# Patient Record
Sex: FEMALE | Race: WHITE
[De-identification: names, ages, dates, MRNs, and addresses within clinical notes are randomized per-mention and may not be internally consistent; named-entity substitution may affect disease eponyms.]

---

## 2019-04-07 ENCOUNTER — HOSPITAL ENCOUNTER (EMERGENCY)
Dept: HOSPITAL 62 - ER | Age: 54
LOS: 1 days | Discharge: HOME | End: 2019-04-08
Payer: MEDICAID

## 2019-04-07 DIAGNOSIS — Z88.2: ICD-10-CM

## 2019-04-07 DIAGNOSIS — I10: ICD-10-CM

## 2019-04-07 DIAGNOSIS — H10.32: ICD-10-CM

## 2019-04-07 DIAGNOSIS — I48.91: ICD-10-CM

## 2019-04-07 DIAGNOSIS — Z90.49: ICD-10-CM

## 2019-04-07 DIAGNOSIS — Z88.6: ICD-10-CM

## 2019-04-07 DIAGNOSIS — R11.0: ICD-10-CM

## 2019-04-07 DIAGNOSIS — G43.909: Primary | ICD-10-CM

## 2019-04-07 DIAGNOSIS — I25.2: ICD-10-CM

## 2019-04-07 DIAGNOSIS — E78.00: ICD-10-CM

## 2019-04-07 DIAGNOSIS — Z98.51: ICD-10-CM

## 2019-04-07 DIAGNOSIS — E11.9: ICD-10-CM

## 2019-04-07 DIAGNOSIS — Z88.0: ICD-10-CM

## 2019-04-07 PROCEDURE — 96372 THER/PROPH/DIAG INJ SC/IM: CPT

## 2019-04-07 PROCEDURE — 99283 EMERGENCY DEPT VISIT LOW MDM: CPT

## 2019-04-08 VITALS — SYSTOLIC BLOOD PRESSURE: 129 MMHG | DIASTOLIC BLOOD PRESSURE: 49 MMHG

## 2019-04-08 NOTE — ER DOCUMENT REPORT
ED Headache





- General


Chief Complaint: Headache


Stated Complaint: HEADACHE


Time Seen by Provider: 04/08/19 00:35


Primary Care Provider: 


SCOOTER REZA MD [Primary Care Provider] - Follow up as needed


Mode of Arrival: Ambulatory


TRAVEL OUTSIDE OF THE [place] IN LAST 30 DAYS: No





- HPI


Patient complains to provider of: Headache, "Migraine"


Notes: 





Patient is here with complaints of headache.  The patient has a long history of 

chronic migraines.  She states that she has been having this headache for 6-10 

months.  Headache is been intermittent, feels like her typical migraine type 

headaches, but her Imitrex has not been helping.  She is in the process of 

getting referred to pain management due to her chronic headaches.  She denies 

this being a sudden onset, thunderclap headache.  It was not due to head injury.

 She denies any fevers or neck stiffness.  No blurred or loss vision.  No 

numbness, tingling, weakness.  No chest pain or shortness of breath.  She does 

complain of nausea, but denies any vomiting or diarrhea.  Patient also states 

she has had left eye irritation and drainage for the last 2 days.  She denies 

any severe pain.  No injury.  She does not wear contacts.  She denies any 

blurred or loss vision to the eye.  She denies any pain surrounding the eye.  

She denies any known specific sick contacts.  No rashes.  No other specific 

complaints at this time.





- Related Data


Allergies/Adverse Reactions: 


                                        





tramadol Allergy (Intermediate, Verified 04/08/19 00:26)


   AMS


Penicillins Allergy (Mild, Verified 04/08/19 00:26)


   rash


Sulfa (Sulfonamide Antibiotics) Allergy (Mild, Verified 04/08/19 00:26)


   rash


aspirin Allergy (Verified 04/08/19 00:26)


   NO ASA











Past Medical History





- Social History


Smoking Status: Former Smoker


Frequency of alcohol use: None


Drug Abuse: None


Family History: Reviewed & Not Pertinent, CAD, CVA, DM, Hyperlipidemia, 

Hypertension, Malignancy, Thyroid Disfunction, Other


Patient has suicidal ideation: No


Patient has homicidal ideation: No





- Past Medical History


Cardiac Medical History: Reports: Hx Atrial Fibrillation, Hx Heart Attack - A-

FIB, Hx Hypercholesterolemia, Hx Hypertension


   Denies: Hx Coronary Artery Disease


Pulmonary Medical History: Reports: Hx Asthma, Hx Bronchitis


   Denies: Hx COPD, Hx Pneumonia


Neurological Medical History: Reports: Hx Migraine.  Denies: Hx Cerebrovascular 

Accident, Hx Seizures


Endocrine Medical History: Reports: Hx Diabetes Mellitus Type 2, Hx 

Hypothyroidism


Renal/ Medical History: Denies: Hx Peritoneal Dialysis


GI Medical History: Reports: Hx Gastritis, Hx Gastroesophageal Reflux Disease


Musculoskeletal Medical History: Reports Hx Arthritis, Reports Hx 

Musculoskeletal Trauma


Psychiatric Medical History: Reports: Hx Anxiety, Hx Depression, Hx 

Schizoaffective Disorder, Hx Schizophrenia - schizo affective


Past Surgical History: Reports: Hx Cholecystectomy, Hx Tubal Ligation.  Denies: 

Hx Hysterectomy





- Immunizations


Immunizations up to date: Yes


Hx Diphtheria, Pertussis, Tetanus Vaccination: Yes - 2013





Review of Systems





- Review of Systems


-: Yes All other systems reviewed and negative





Physical Exam





- Vital signs


Vitals: 





                                        











Temp Pulse Resp BP Pulse Ox


 


 98.2 F   81   18   122/78   94 


 


 04/07/19 22:02  04/07/19 22:02  04/07/19 22:02  04/07/19 22:02  04/07/19 22:02














- Notes


Notes: 





GENERAL: alert, cooperative, nontoxic, no distress.


HEAD: normocephalic, atraumatic


EYES: Left conjunctive with mild injection and yellow drainage present.  No 

external redness or swelling. Pupils are equal, round, reactive to light.  

Extraocular muscles are intact bilaterally.


EARS: no external swelling, no external redness


NOSE: atraumatic, no external swelling


MOUTH/THROAT: mucous membranes moist and pink, posterior pharynx without 

erythema, swelling, exudate. No trismus or drooling.


NECK: soft, supple, full range of motion, no meningismus.


CHEST: no distress, lungs clear and equal throughout.  No wheezing, rales, 

rhonchi.


CARDIAC: regular rate and rhythm, no murmur, normal capillary refill, normal 

pulses.  No peripheral edema noted.


BACK: full range of motion, no CVA tenderness.


EXTREMITIES: full range of motion of all extremities.  No redness, no swelling.


NEURO: alert and oriented x 3, cranial nerves II through XII are grossly intact.

 Upper and lower extremities are equal throughout.  Normal sensation. No focal 

deficits, full range of motion of all extremities. normal finger to nose. 


PYSCH: appropriate mood, affect.  Patient is cooperative.


SKIN: pink, warm, dry, no rash.





Course





- Re-evaluation


Re-evalutation: 





04/08/19 00:43


Patient is nontoxic-appearing with stable vitals.  Patient is here with 

complaints of headache for the last 6-10 months.  She has a history of chronic 

migraines, sees neurology is in the process of being referred to pain 

management.  This is the same headache that she always has.  She states that it 

actually feels better now than it was earlier today.  She denies any head 

injury.  No fevers.  She has no neck stiffness.  She has no signs of meningitis.

 No concern for subarachnoid hemorrhage or intracranial hemorrhage.  Headache 

was not sudden onset in nature.  She also noted to have some left eye redness 

and drainage consistent with conjunctivitis.  She denies any injury to this 

area.  She does not wear contacts.  She denies any visual complaints.  She is no

signs of periorbital cellulitis.  At this point the patient will be given a dose

of Toradol, Benadryl, Reglan in the emergency department and can be discharged 

home with instructions to follow-up with her neurologist.  She will be given a 

prescription for Ciloxan to take for her conjunctivitis with a referral to 

ophthalmology if this does not improve in the next few days.  She should follow-

up sooner if she develops any worsening pain, high fever, neck stiffness, 

persistent vomiting, numbness, tingling, weakness, any further concerns.





The patient's emergency department workup and current diagnosis were explained 

to the patient and or family.  Follow-up instructions were provided.  

Medications if prescribed were discussed. Instructions for when to return to the

emergency department including specific  worrisome symptoms were discussed with 

the patient and/or family.





- Vital Signs


Vital signs: 





                                        











Temp Pulse Resp BP Pulse Ox


 


 98.2 F   52 L  17   129/49 H  99 


 


 04/07/19 22:02  04/08/19 00:26  04/08/19 00:26  04/08/19 00:26  04/08/19 00:26














Discharge





- Discharge


Clinical Impression: 


Migraine


Qualifiers:


 Migraine type: unspecified Status migrainosus presence: without status 

migrainosus Intractability: not intractable Qualified Code(s): G43.909 - 

Migraine, unspecified, not intractable, without status migrainosus





Conjunctivitis, left eye


Qualifiers:


 Conjunctivitis type: acute Acute conjunctivitis type: unspecified Qualified 

Code(s): H10.32 - Unspecified acute conjunctivitis, left eye





Condition: Stable


Disposition: HOME, SELF-CARE


Instructions:  Headache ([facility]), Conjunctivitis ([facility])


Additional Instructions: 


Take medications as prescribed.  Wash hands frequently.  Follow-up with your 

doctor at the next available appointment.  Follow-up with ophthalmology if your 

eyes not better in the next 2-3 days, sooner for worsening symptoms, high fever,

persistent vomiting, numbness, tingling, weakness, persistent vomiting, or for 

any further concerns.


Prescriptions: 


Ciprofloxacin HCl [Ciloxan 0.3% Oph Soln 2.5 ml] 1 drop OP Q4H 7 Days #1 bottle


Forms:  Smoking Cessation Education


Referrals: 


SCOOTER REZA MD [Primary Care Provider] - Follow up as needed


ORSA ELENA MCCULLOUGH MD [ACTIVE STAFF] - Follow up as needed


FLORIDALMA MILLER OT [OPTHALMIC TECHNICIAN] - Follow up as needed


GUMARO MEIER DO [ACTIVE STAFF] - Follow up as needed


SRI PATIÑO MD [NO LOCAL MD] - Follow up as needed


TANVIR CARLOS MD [NO LOCAL MD] - Follow up as needed


AMRITA CALIXTO MD [NO LOCAL MD] - Follow up as needed


CARLY ASTORGA MD [NO LOCAL MD] - Follow up as needed

## 2019-04-11 ENCOUNTER — HOSPITAL ENCOUNTER (OUTPATIENT)
Dept: HOSPITAL 62 - OD | Age: 54
End: 2019-04-11
Attending: FAMILY MEDICINE
Payer: MEDICAID

## 2019-04-11 DIAGNOSIS — M54.5: ICD-10-CM

## 2019-04-11 DIAGNOSIS — E03.9: Primary | ICD-10-CM

## 2019-04-11 DIAGNOSIS — R68.2: ICD-10-CM

## 2019-04-11 LAB
FREE T4 (FREE THYROXINE): 1.23 NG/DL (ref 0.78–2.19)
T3FREE SERPL-MCNC: 3.31 PG/ML (ref 2.77–5.27)
TSH SERPL-ACNC: < 0.01 UIU/ML (ref 0.47–4.68)

## 2019-04-11 PROCEDURE — 84443 ASSAY THYROID STIM HORMONE: CPT

## 2019-04-11 PROCEDURE — 72110 X-RAY EXAM L-2 SPINE 4/>VWS: CPT

## 2019-04-11 PROCEDURE — 36415 COLL VENOUS BLD VENIPUNCTURE: CPT

## 2019-04-11 PROCEDURE — 86235 NUCLEAR ANTIGEN ANTIBODY: CPT

## 2019-04-11 PROCEDURE — 84439 ASSAY OF FREE THYROXINE: CPT

## 2019-04-11 PROCEDURE — 84481 FREE ASSAY (FT-3): CPT

## 2019-04-11 NOTE — RADIOLOGY REPORT (SQ)
EXAM DESCRIPTION:  LUMBAR SPINE COMPLETE



COMPLETED DATE/TIME:  4/11/2019 12:04 pm



REASON FOR STUDY:  CHRONIC LBP WITHOUT SCIATICA E03.9  HYPOTHYROIDISM, UNSPECIFIED G89.29  OTHER 
NATHAN PAIN M54.5  LOW BACK PAIN



COMPARISON:  None.



NUMBER OF VIEWS:  Five views including obliques.



TECHNIQUE:  AP, lateral, oblique, and sacral radiographic images acquired of the lumbar spine.



LIMITATIONS:  None.



FINDINGS:  MINERALIZATION: Normal.

SEGMENTATION: Normal.  No transitional anatomy.

ALIGNMENT: Normal.

VERTEBRAE: Maintained height.  No fracture or worrisome bone lesion.

DISCS: Preserved height.  No significant osteophytes or end plate irregularity.

POSTERIOR ELEMENTS: Pedicles and facets are intact.  No pars defect or posterior arch defects.

HARDWARE: None in the spine.

PARASPINAL SOFT TISSUES: Normal.

PELVIS: Intact as visualized. No fractures or worrisome bone lesions. SI joints intact.

OTHER: No other significant finding.



IMPRESSION:  NORMAL 5 VIEW LUMBAR SPINE.



TECHNICAL DOCUMENTATION:  JOB ID:  [id number]

 2011 [employer]- All Rights Reserved



Reading location - IP/workstation name: [email or web address] [email or web address]

## 2019-06-30 ENCOUNTER — HOSPITAL ENCOUNTER (EMERGENCY)
Dept: HOSPITAL 62 - ER | Age: 54
LOS: 1 days | Discharge: HOME | End: 2019-07-01
Payer: MEDICAID

## 2019-06-30 VITALS — DIASTOLIC BLOOD PRESSURE: 60 MMHG | SYSTOLIC BLOOD PRESSURE: 145 MMHG

## 2019-06-30 DIAGNOSIS — N39.0: Primary | ICD-10-CM

## 2019-06-30 DIAGNOSIS — E11.9: ICD-10-CM

## 2019-06-30 DIAGNOSIS — Z88.5: ICD-10-CM

## 2019-06-30 DIAGNOSIS — Z88.2: ICD-10-CM

## 2019-06-30 DIAGNOSIS — J45.909: ICD-10-CM

## 2019-06-30 DIAGNOSIS — Z88.8: ICD-10-CM

## 2019-06-30 DIAGNOSIS — Z88.0: ICD-10-CM

## 2019-06-30 DIAGNOSIS — I10: ICD-10-CM

## 2019-06-30 LAB
APPEARANCE UR: (no result)
APTT PPP: (no result) S
BILIRUB UR QL STRIP: NEGATIVE
GLUCOSE UR STRIP-MCNC: 50 MG/DL
KETONES UR STRIP-MCNC: NEGATIVE MG/DL
NITRITE UR QL STRIP: NEGATIVE
PH UR STRIP: 6 [PH] (ref 5–9)
PROT UR STRIP-MCNC: 100 MG/DL
SP GR UR STRIP: 1
UROBILINOGEN UR-MCNC: NEGATIVE MG/DL (ref ?–2)

## 2019-06-30 PROCEDURE — 99283 EMERGENCY DEPT VISIT LOW MDM: CPT

## 2019-06-30 PROCEDURE — 81001 URINALYSIS AUTO W/SCOPE: CPT

## 2019-06-30 NOTE — ER DOCUMENT REPORT
ED Medical Screen (RME)





- General


Chief Complaint: Urinary Problem


Stated Complaint: URINARY PROBLEM


Time Seen by Provider: 06/30/19 23:31


Primary Care Provider: 


RIKI ALAS DO [Primary Care Provider] - Follow up as needed


Mode of Arrival: Ambulatory


Information source: Patient


Notes: 


54-year-old  female coming in today with suprapubic pressure, frequency

of urination, dysuria, and hesitancy.  Symptoms started this evening.  No fevers

or chills.  No flank pain.


I have treated and performed a rapid initial assessment of this patient.  A 

comprehensive ED assessment and evaluation of the patient, analysis of test 

results and completion of medical decision making process will be conducted by 

additional ED providers.





PHYSICAL EXAMINATION:





GENERAL: Well-appearing, well-nourished and in no acute distress.  A&Ox4.  

Answers questions appropriately.





LUNGS: Breath sounds clear to auscultation bilaterally and equal.  No wheezes 

rales or rhonchi.





HEART: Regular rate and rhythm without murmurs, rubs, gallops.


Abdomen: Suprapubic tenderness





Extremities:  No cyanosis, clubbing, or edema b/l.  





NEUROLOGICAL: Normal speech, normal gait. 





PSYCH: Normal mood, normal affect.





TRAVEL OUTSIDE OF THE [place] IN LAST 30 DAYS: No





- Related Data


Allergies/Adverse Reactions: 


                                        





tramadol Allergy (Intermediate, Verified 04/08/19 00:26)


   AMS


Penicillins Allergy (Mild, Verified 04/08/19 00:26)


   rash


Sulfa (Sulfonamide Antibiotics) Allergy (Mild, Verified 04/08/19 00:26)


   rash


aspirin Allergy (Verified 04/08/19 00:26)


   NO ASA











Past Medical History





- Past Medical History


Cardiac Medical History: Reports: Hx Atrial Fibrillation, Hx Heart Attack - A-

FIB, Hx Hypercholesterolemia, Hx Hypertension


   Denies: Hx Coronary Artery Disease


Pulmonary Medical History: Reports: Hx Asthma, Hx Bronchitis


   Denies: Hx COPD, Hx Pneumonia


Neurological Medical History: Reports: Hx Migraine.  Denies: Hx Cerebrovascular 

Accident, Hx Seizures


Endocrine Medical History: Reports: Hx Diabetes Mellitus Type 2, Hx 

Hypothyroidism


Renal/ Medical History: Denies: Hx Peritoneal Dialysis


GI Medical History: Reports: Hx Gastritis, Hx Gastroesophageal Reflux Disease


Musculoskeltal Medical History: Reports Hx Arthritis, Reports Hx Musculoskeletal

Trauma


Psychiatric Medical History: Reports: Hx Anxiety, Hx Depression, Hx 

Schizoaffective Disorder, Hx Schizophrenia - schizo affective


Past Surgical History: Reports: Hx Cholecystectomy, Hx Tubal Ligation.  Denies: 

Hx Hysterectomy





- Immunizations


Immunizations up to date: Yes


Hx Diphtheria, Pertussis, Tetanus Vaccination: Yes - 2013


History of Influenza Vaccine for 10/2017 - 3/2018 Season: Refused





Physical Exam





- Vital signs


Vitals: 





                                        











Temp Pulse Resp BP Pulse Ox


 


 98.2 F   86   16   145/60 H  96 


 


 06/30/19 22:47  06/30/19 22:47  06/30/19 22:47  06/30/19 22:47  06/30/19 22:47














Course





- Vital Signs


Vital signs: 





                                        











Temp Pulse Resp BP Pulse Ox


 


 98.2 F   86   16   145/60 H  96 


 


 06/30/19 22:47  06/30/19 22:47  06/30/19 22:47  06/30/19 22:47  06/30/19 22:47














- Laboratory


Laboratory results interpreted by me: 





                                        











  06/30/19





  22:45


 


Urine Protein  100 H


 


Urine Glucose (UA)  50 H


 


Urine Blood  LARGE H


 


Ur Leukocyte Esterase  LARGE H














Doctor's Discharge





- Discharge


Referrals: 


RIKI ALAS DO [Primary Care Provider] - Follow up as needed

## 2019-09-28 ENCOUNTER — HOSPITAL ENCOUNTER (EMERGENCY)
Dept: HOSPITAL 62 - ER | Age: 54
LOS: 1 days | Discharge: HOME | End: 2019-09-29
Payer: MEDICAID

## 2019-09-28 DIAGNOSIS — Z88.5: ICD-10-CM

## 2019-09-28 DIAGNOSIS — Z88.8: ICD-10-CM

## 2019-09-28 DIAGNOSIS — E11.9: ICD-10-CM

## 2019-09-28 DIAGNOSIS — J45.909: ICD-10-CM

## 2019-09-28 DIAGNOSIS — M25.542: Primary | ICD-10-CM

## 2019-09-28 DIAGNOSIS — Z88.0: ICD-10-CM

## 2019-09-28 DIAGNOSIS — M79.10: ICD-10-CM

## 2019-09-28 DIAGNOSIS — Z88.2: ICD-10-CM

## 2019-09-28 DIAGNOSIS — M25.40: ICD-10-CM

## 2019-09-28 DIAGNOSIS — I10: ICD-10-CM

## 2019-09-28 PROCEDURE — 73130 X-RAY EXAM OF HAND: CPT

## 2019-09-28 PROCEDURE — 99283 EMERGENCY DEPT VISIT LOW MDM: CPT

## 2019-09-28 NOTE — RADIOLOGY REPORT (SQ)
EXAM DESCRIPTION:

Left hand

[facility]: XR HAND 3 OR MORE VIEWS 

Views:  3 



CLINICAL HISTORY:

54 years Female, pain and swelling



COMPARISON:

None.



FINDINGS:

 No acute fracture or dislocation. Ring is noted on the 3rd

finger. No lytic bone changes or periosteal reaction. No soft

tissue air.



IMPRESSION: 

1.  No acute findings.

## 2019-09-29 VITALS — SYSTOLIC BLOOD PRESSURE: 148 MMHG | DIASTOLIC BLOOD PRESSURE: 79 MMHG

## 2019-09-29 NOTE — ER DOCUMENT REPORT
ED Hand/Wrist Injury





- General


Chief Complaint: Hand Swelling


Stated Complaint: LEFT HAND NUMBNESS


Time Seen by Provider: 09/29/19 00:29


Primary Care Provider: 


RIKI ALAS DO [Primary Care Provider] - Follow up as needed


TRAVEL OUTSIDE OF THE [place] IN LAST 30 DAYS: No





- HPI


Notes: 





This is a 54-year-old female who presents to the complaint of left hand swelling

and pain that started today.  She denies any trauma.  She describes arthralgias 

of her left and joints.  Pain is worse with palpation.  No cardiopulmonary 

symptoms.  No trauma.  Describes symptoms as mild.  She denies any rash.





- Related Data


Allergies/Adverse Reactions: 


                                        





tramadol Allergy (Intermediate, Verified 07/22/19 14:36)


   AMS


Penicillins Allergy (Mild, Verified 07/22/19 14:36)


   rash


Sulfa (Sulfonamide Antibiotics) Allergy (Mild, Verified 07/22/19 14:36)


   rash


aspirin Allergy (Verified 07/22/19 14:36)


   NO ASA











Past Medical History





- Social History


Smoking Status: Unknown if Ever Smoked


Family History: Reviewed & Not Pertinent, CAD, CVA, DM, Hyperlipidemia, 

Hypertension, Malignancy, Thyroid Disfunction, Other


Patient has suicidal ideation: No


Patient has homicidal ideation: No





- Past Medical History


Cardiac Medical History: Reports: Hx Atrial Fibrillation, Hx Heart Attack - A-

FIB, Hx Hypercholesterolemia, Hx Hypertension


   Denies: Hx Coronary Artery Disease


Pulmonary Medical History: Reports: Hx Asthma, Hx Bronchitis


   Denies: Hx COPD, Hx Pneumonia


Neurological Medical History: Reports: Hx Migraine.  Denies: Hx Cerebrovascular 

Accident, Hx Seizures


Endocrine Medical History: Reports: Hx Diabetes Mellitus Type 2, Hx 

Hypothyroidism


Renal/ Medical History: Denies: Hx Peritoneal Dialysis


GI Medical History: Reports: Hx Gastritis, Hx Gastroesophageal Reflux Disease


Musculoskeletal Medical History: Reports Hx Arthritis, Reports Hx 

Musculoskeletal Trauma


Psychiatric Medical History: Reports: Hx Anxiety, Hx Depression, Hx 

Schizoaffective Disorder, Hx Schizophrenia - schizo affective


Past Surgical History: Reports: Hx Cholecystectomy, Hx Tubal Ligation.  Denies: 

Hx Hysterectomy





- Immunizations


Immunizations up to date: Yes


Hx Diphtheria, Pertussis, Tetanus Vaccination: Yes - 2013





Review of Systems





- Review of Systems


Musculoskeletal: Joint swelling, Muscle pain.  denies: Muscle stiffness, 

Deformity


-: Yes All other systems reviewed and negative





Physical Exam





- Vital signs


Vitals: 





                                        











Temp Pulse Resp BP Pulse Ox


 


 97.9 F   78   16   140/86 H  95 


 


 09/28/19 20:54  09/28/19 20:54  09/28/19 20:54  09/28/19 20:54  09/28/19 20:54














- HEENT


Head: Normocephalic, Atraumatic


Eyes: Normal


Pupils: PERRL





- Respiratory


Respiratory status: No respiratory distress


Chest status: Nontender


Breath sounds: Normal


Chest palpation: Normal





- Cardiovascular


Rhythm: Regular


Heart sounds: Normal auscultation


Murmur: No





- Extremities


General upper extremity: Nontender, Normal color, Normal ROM, Normal temperature


General lower extremity: Normal inspection, Nontender, Normal color, Normal ROM,

Normal temperature, Normal weight bearing.  No: Eliane's sign


Hand: Other - There is slight soft tissue tenderness of the dorsum of the hand. 

I do not appreciate any significant swelling.  There is maybe slight nonpitting 

edema.  Both hands appear the same to me but patient thinks the left hand is 

swollen.  Normal distal neurovascular exam of the left upper extremity.  No 

erythema.  No pitting edema..  No: Deformity





- Skin


Skin Temperature: Warm


Skin Moisture: Dry


Skin Color: Normal





Course





- Re-evaluation


Re-evalutation: 





09/29/19 00:56


Differential diagnosis includes arthritis versus nonspecific arthralgia versus 

inflammation.  X-ray done in triage reviewed and is unremarkable.  There is no 

clinical suspicion for infection.  There is no indication for further work-up.  

Patient has multiple drug allergies.  Will give her Tylenol for her pain.  She 

is stable for discharge.





- Vital Signs


Vital signs: 





                                        











Temp Pulse Resp BP Pulse Ox


 


 97.9 F   78   16   140/86 H  95 


 


 09/28/19 20:54  09/28/19 20:54  09/28/19 20:54  09/28/19 20:54  09/28/19 20:54














- Diagnostic Test


Radiology reviewed: Reports reviewed





Discharge





- Discharge


Clinical Impression: 


Arthralgia


Qualifiers:


 Joint pain location: hand Laterality: left Qualified Code(s): M25.542 - Pain in

joints of left hand





Condition: Good


Disposition: HOME, SELF-CARE


Instructions:  Arthralgia ([facility])


Additional Instructions: 


Take Tylenol as needed for pain.


Referrals: 


RIKI ALAS DO [Primary Care Provider] - Follow up tomorrow

## 2019-10-21 ENCOUNTER — HOSPITAL ENCOUNTER (EMERGENCY)
Dept: HOSPITAL 62 - ER | Age: 54
Discharge: HOME | End: 2019-10-21
Payer: MEDICAID

## 2019-10-21 VITALS — DIASTOLIC BLOOD PRESSURE: 71 MMHG | SYSTOLIC BLOOD PRESSURE: 148 MMHG

## 2019-10-21 DIAGNOSIS — R51: Primary | ICD-10-CM

## 2019-10-21 DIAGNOSIS — Z88.5: ICD-10-CM

## 2019-10-21 DIAGNOSIS — Z87.891: ICD-10-CM

## 2019-10-21 DIAGNOSIS — I10: ICD-10-CM

## 2019-10-21 DIAGNOSIS — J45.909: ICD-10-CM

## 2019-10-21 DIAGNOSIS — E78.00: ICD-10-CM
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
## 2019-10-21 DIAGNOSIS — Z88.8: ICD-10-CM

## 2019-10-21 DIAGNOSIS — E11.9: ICD-10-CM

## 2019-10-21 DIAGNOSIS — Z79.899: ICD-10-CM

## 2019-10-21 DIAGNOSIS — S90.112A: ICD-10-CM

## 2019-10-21 DIAGNOSIS — Z88.2: ICD-10-CM

## 2019-10-21 DIAGNOSIS — Z88.0: ICD-10-CM

## 2019-10-21 DIAGNOSIS — R00.0: ICD-10-CM

## 2019-10-21 PROCEDURE — 99284 EMERGENCY DEPT VISIT MOD MDM: CPT

## 2019-10-21 PROCEDURE — 73630 X-RAY EXAM OF FOOT: CPT

## 2019-10-21 NOTE — ER DOCUMENT REPORT
ED Extremity Problem, Lower





- General


Chief Complaint: Headache <24 hrs old


Stated Complaint: HEADACHE


Time Seen by Provider: 10/21/19 18:16


Primary Care Provider: 


RIKI ALAS DO [Primary Care Provider] - Follow up as needed


Mode of Arrival: Ambulatory


Information source: Patient


TRAVEL OUTSIDE OF THE [place] IN LAST 30 DAYS: No





- HPI


Notes: 





Patient presents with 2 complaints.  She states she has had a headache for 2 to 

3 days.  It is diffuse and throbbing.  It feels like her previous migraine 

headaches.  No trauma.  No fever.  No cough cold or congestion.  This headache 

is described as moderate to severe.  It is throbbing.  It is constant.  She 

states she just takes Tylenol Motrin for the pain but this is not relieved it.  

The pain does radiate throughout her head.  No sinus congestion.  She also 

states that approximately 3 hours before arrival she hit her left toe on a chair

and it is now throbbing and painful.





- Related Data


Allergies/Adverse Reactions: 


                                        





tramadol Allergy (Intermediate, Verified 07/22/19 14:36)


   AMS


Penicillins Allergy (Mild, Verified 07/22/19 14:36)


   rash


Sulfa (Sulfonamide Antibiotics) Allergy (Mild, Verified 07/22/19 14:36)


   rash


aspirin Allergy (Verified 07/22/19 14:36)


   NO ASA








Home Medications: diabetes, cholesterol, and others "hard to recall"





Past Medical History





- General


Information source: Patient





- Social History


Smoking Status: Former Smoker


Frequency of alcohol use: Occasional


Drug Abuse: None


Family History: Reviewed & Not Pertinent, CAD, CVA, DM, Hyperlipidemia, 

Hypertension, Malignancy, Thyroid Disfunction, Other


Patient has suicidal ideation: No


Patient has homicidal ideation: No





- Past Medical History


Cardiac Medical History: Reports: Hx Atrial Fibrillation, Hx Heart Attack - A-

FIB, Hx Hypercholesterolemia, Hx Hypertension


   Denies: Hx Coronary Artery Disease


Pulmonary Medical History: Reports: Hx Asthma, Hx Bronchitis


   Denies: Hx COPD, Hx Pneumonia


Neurological Medical History: Reports: Hx Migraine.  Denies: Hx Cerebrovascular 

Accident, Hx Seizures


Endocrine Medical History: Reports: Hx Diabetes Mellitus Type 2, Hx 

Hypothyroidism


Renal/ Medical History: Denies: Hx Peritoneal Dialysis


GI Medical History: Reports: Hx Gastritis, Hx Gastroesophageal Reflux Disease


Musculoskeletal Medical History: Reports Hx Arthritis, Reports Hx 

Musculoskeletal Trauma


Psychiatric Medical History: Reports: Hx Anxiety, Hx Depression, Hx 

Schizoaffective Disorder, Hx Schizophrenia - schizo affective


Past Surgical History: Reports: Hx Cholecystectomy, Hx Tubal Ligation.  Denies: 

Hx Hysterectomy





- Immunizations


Immunizations up to date: Yes


Hx Diphtheria, Pertussis, Tetanus Vaccination: Yes - 2013





Review of Systems





- Review of Systems


Constitutional: denies: Chills, Fever


Cardiovascular: denies: Chest pain, Palpitations


Respiratory: denies: Cough, Short of breath


-: Yes All other systems reviewed and negative





Physical Exam





- Vital signs


Vitals: 


                                        











Temp Pulse Resp BP Pulse Ox


 


 98.0 F   115 H  17   156/92 H  95 


 


 10/21/19 17:56  10/21/19 17:56  10/21/19 17:56  10/21/19 17:56  10/21/19 17:56











Interpretation: Tachycardic





- General


General appearance: Appears well, Alert





- HEENT


Head: Normocephalic, Atraumatic


Eyes: Normal


Pupils: PERRL





- Respiratory


Respiratory status: No respiratory distress


Chest status: Nontender


Breath sounds: Normal


Chest palpation: Normal





- Cardiovascular


Rhythm: Tachycardia


Heart sounds: Normal auscultation


Murmur: No





- Abdominal


Inspection: Normal


Distension: No distension


Bowel sounds: Normal


Tenderness: Nontender


Organomegaly: No organomegaly





- Back


Back: Normal, Nontender





- Extremities


General upper extremity: Normal inspection, Nontender, Normal color, Normal ROM,

Normal temperature


General lower extremity: Tender - Left great toe is erythematous and diffusely 

tender., Normal temperature.  No: Eliane's sign





- Neurological


Neuro grossly intact: Yes


Cognition: Normal


Orientation: AAOx4


[place] Coma Scale Eye Opening: Spontaneous


[place] Coma Scale Verbal: Oriented


[place] Coma Scale Motor: Obeys Commands


[place] Coma Scale Total: 15


Speech: Normal


Cranial nerves: Normal


Cerebellar coordination: No: Finger-nose rhombey


Motor strength normal: LUE, RUE, LLE, RLE


Additional motor exam normals: Equal .  No: Pronator drift


Sensory: Normal





- Psychological


Associated symptoms: Normal affect, Normal mood





- Skin


Skin Temperature: Warm


Skin Moisture: Dry


Skin Color: Normal





Course





- Re-evaluation


Re-evalutation: 





10/21/19 21:10


Patient presents with 2 complaints.  She presents with toe pain after an injury.

 She has no evidence of fracture.  Patient also presents with headache 

neurological exam is unremarkable and this is consistent with patient's usual 

migraine headache.  She received relief with Fioricet and will be discharged 

home without medication.  Upon discharge her heart rate is 90, she is no longer 

tachycardic.





- Vital Signs


Vital signs: 


                                        











Temp Pulse Resp BP Pulse Ox


 


 98.0 F   115 H  17   156/92 H  95 


 


 10/21/19 17:56  10/21/19 17:56  10/21/19 17:56  10/21/19 17:56  10/21/19 17:56














- Diagnostic Test


Radiology reviewed: Image reviewed, Reports reviewed





Discharge





- Discharge


Clinical Impression: 


Headache


Qualifiers:


 Headache type: unspecified Headache chronicity pattern: acute headache 

Intractability: intractable Qualified Code(s): R51 - Headache





Contusion of great toe, left


Qualifiers:


 Encounter type: initial encounter Damage to nail status: without damage 

Qualified Code(s): S90.112A - Contusion of left great toe without damage to 

nail, initial encounter





Condition: Stable


Disposition: HOME, SELF-CARE


Instructions:  Headache ([facility]), Contusion ([facility])


Prescriptions: 


Butalb/Acetaminophen/Caffeine [Fioricet (-40 mg) Tablet] 1 tab PO Q4HP PRN

#30 tab


 PRN Reason: 


Referrals: 


RIKI ALAS DO [Primary Care Provider] - Follow up as needed

## 2019-10-21 NOTE — RADIOLOGY REPORT (SQ)
EXAM DESCRIPTION:  FOOT LEFT COMPLETE



COMPLETED DATE/TIME:  10/21/2019 6:54 pm



REASON FOR STUDY:  pain and injury



COMPARISON:  None.



NUMBER OF VIEWS:  Three views.



TECHNIQUE:  AP, lateral and oblique  radiographic images acquired of the left foot.



LIMITATIONS:  None.



FINDINGS:  MINERALIZATION: Normal.

BONES: No acute fracture or dislocation.  No worrisome bone lesions.

JOINTS: No effusions.

SOFT TISSUES: No soft tissue swelling.  No foreign body.

OTHER: No other significant finding.



IMPRESSION:  NO RADIOGRAPHIC EVIDENCE OF ACUTE INJURY.



TECHNICAL DOCUMENTATION:  JOB ID:  [id number]

 2011 [employer]- All Rights Reserved



Reading location - IP/workstation name: [email or web address] [email or web address]

## 2019-10-26 ENCOUNTER — HOSPITAL ENCOUNTER (EMERGENCY)
Dept: HOSPITAL 62 - ER | Age: 54
Discharge: HOME | End: 2019-10-26
Payer: MEDICAID

## 2019-10-26 VITALS — DIASTOLIC BLOOD PRESSURE: 82 MMHG | SYSTOLIC BLOOD PRESSURE: 163 MMHG

## 2019-10-26 DIAGNOSIS — E11.9: ICD-10-CM

## 2019-10-26 DIAGNOSIS — J45.909: ICD-10-CM

## 2019-10-26 DIAGNOSIS — L08.9: ICD-10-CM

## 2019-10-26 DIAGNOSIS — M79.675: ICD-10-CM

## 2019-10-26 DIAGNOSIS — I25.2: ICD-10-CM

## 2019-10-26 DIAGNOSIS — I10: ICD-10-CM

## 2019-10-26 DIAGNOSIS — L03.032: Primary | ICD-10-CM

## 2019-10-26 PROCEDURE — 99283 EMERGENCY DEPT VISIT LOW MDM: CPT

## 2019-10-26 PROCEDURE — 10060 I&D ABSCESS SIMPLE/SINGLE: CPT

## 2019-10-26 PROCEDURE — 0H9NXZZ DRAINAGE OF LEFT FOOT SKIN, EXTERNAL APPROACH: ICD-10-PCS | Performed by: EMERGENCY MEDICINE

## 2019-10-26 NOTE — ER DOCUMENT REPORT
ED General





- General


Chief Complaint: infected toe


Stated Complaint: LEFT TOE PAIN


Time Seen by Provider: 10/26/19 21:43


Primary Care Provider: 


RIKI ALAS DO [Primary Care Provider] - Follow up in 3-5 days


TRAVEL OUTSIDE OF THE [place] IN LAST 30 DAYS: No





- HPI


Notes: 





Patient is a 54-year-old female that presents to the emergency department for 

chief complaint of toe infection.


Patient reports redness and swelling around her left big toe for the last 2 

days.  She denies spontaneous drainage from the area.  She has not been putting 

anything on the area.  She states she has not taken any medicine at home for 

pain.  States the pain is worse with movement and ambulation.  She does report 

stopping that toe and having negative x-rays about 1 week ago.





Past Medical History: Diabetes





Past Surgical History: Reviewed in chart





Social History: Reviewed in chart





Family History: Reviewed and noncontributory for presenting illness





Allergies: Reviewed, see documented allergy list.








REVIEW OF SYSTEMS:





CONSTITUTIONAL : 





No fever





No chills





No diaphoresis





No recent illness





EENT:





No vision changes





No congestion





No sore throat  





CARDIOVASCULAR:





No chest pain





No palpitations





RESPIRATORY:





No shortness of breath





No cough





No difficulty breathing





GASTROINTESTINAL: 





No abdominal pain





No nausea





No vomiting





No diarrhea





GENITOURINARY:





No dysuria





No hematuria





No difficulty urinating





MUSCULOSKELETAL:





No back pain





No leg pain





No arm pain





SKIN:  





No rashes





toe lesions





LYMPHATIC: 





No swollen, enlarged glands.





NEUROLOGICAL: 





No lightheadedness





No headache





No weakness





No paresthesias





PSYCHIATRIC:





No anxiety





No depression 








PHYSICAL EXAMINATION:





Vital signs reviewed, nursing noted reviewed.





GENERAL: Well-appearing, overweight  and in no acute distress.





HEAD: Atraumatic, normocephalic.





EYES: Eyes appear normal, extraocular movements intact, sclera anicteric, 

conjunctiva are normal.





ENT: nares patent, oropharynx clear without exudates.  Moist mucous membranes.





NECK: Normal range of motion, supple without lymphadenopathy





LUNGS: Breath sounds clear to auscultation bilaterally and equal.  No wheezes 

rales or rhonchi.





HEART: Regular rate and rhythm without murmurs





ABDOMEN: Soft, nontender, normoactive bowel sounds.  No rebound, guarding, or 

rigidity. No masses appreciated.





EXTREMITIES: Nontender, good range of motion, no pitting or edema. 





NEUROLOGICAL: No focal neurological deficits. Moves all extremities 

spontaneously Motor and sensory grossly intact on exam.





PSYCH: Normal mood, normal affect.





SKIN: Warm, Dry, normal turgor, left great toe paronychia with mild surrounding 

erythema and no spontaneous drainage








- Related Data


Allergies/Adverse Reactions: 


                                        





tramadol Allergy (Intermediate, Verified 07/22/19 14:36)


   AMS


Penicillins Allergy (Mild, Verified 07/22/19 14:36)


   rash


Sulfa (Sulfonamide Antibiotics) Allergy (Mild, Verified 07/22/19 14:36)


   rash


aspirin Allergy (Verified 07/22/19 14:36)


   NO ASA








Home Medications: do not have meds with pt





Past Medical History





- Social History


Smoking Status: Never Smoker


Frequency of alcohol use: Occasional


Drug Abuse: None


Family History: Reviewed & Not Pertinent, CAD, CVA, DM, Hyperlipidemia, 

Hypertension, Malignancy, Thyroid Disfunction, Other


Patient has suicidal ideation: No


Patient has homicidal ideation: No





- Past Medical History


Cardiac Medical History: Reports: Hx Atrial Fibrillation, Hx Heart Attack - A-

FIB, Hx Hypercholesterolemia, Hx Hypertension


   Denies: Hx Coronary Artery Disease


Pulmonary Medical History: Reports: Hx Asthma, Hx Bronchitis


   Denies: Hx COPD, Hx Pneumonia


Neurological Medical History: Reports: Hx Migraine.  Denies: Hx Cerebrovascular 

Accident, Hx Seizures


Endocrine Medical History: Reports: Hx Diabetes Mellitus Type 2, Hx 

Hypothyroidism


Renal/ Medical History: Denies: Hx Peritoneal Dialysis


GI Medical History: Reports: Hx Gastritis, Hx Gastroesophageal Reflux Disease


Musculoskeletal Medical History: Reports Hx Arthritis, Reports Hx 

Musculoskeletal Trauma


Psychiatric Medical History: Reports: Hx Anxiety, Hx Depression, Hx 

Schizoaffective Disorder, Hx Schizophrenia - schizo affective


Past Surgical History: Reports: Hx Cholecystectomy, Hx Tubal Ligation.  Denies: 

Hx Hysterectomy





- Immunizations


Immunizations up to date: Yes


Hx Diphtheria, Pertussis, Tetanus Vaccination: Yes - 2013





Physical Exam





- Vital signs


Vitals: 


                                        











Temp Pulse Resp BP Pulse Ox


 


 98.8 F   97   20   143/86 H  95 


 


 10/26/19 21:24  10/26/19 21:24  10/26/19 21:24  10/26/19 21:24  10/26/19 21:24














Course





- Re-evaluation


Re-evalutation: 





10/26/19 22:27


Vitals reviewed.  Nursing notes reviewed.  Patient has a small paronychia on her

right great toe.  She soaked her foot in warm water with Shur-Clens for 20 

minutes.  The paronychia was drained with moderate amount of purulent discharge.

 Patient does have surrounding erythema it is diabetic.  She will be started on 

doxycycline.  She will follow early next week at her primary care doctors for 

wound reevaluation.  She will return for new or worsening symptoms.





- Vital Signs


Vital signs: 


                                        











Temp Pulse Resp BP Pulse Ox


 


 98.8 F   97   20   143/86 H  95 


 


 10/26/19 21:24  10/26/19 21:24  10/26/19 21:24  10/26/19 21:24  10/26/19 21:24














Procedures





- Incision and Drainage


  ** Left Great toe


Time completed: 22:28


Type: Simple


Needle Size: 18


Incision Method: Incision made with needle


Notes: 





10/26/19 22:28


Left great toe soaked in Shur-Clens and warm water for 20 minutes.  An 18-gauge 

needle was used to gently lift and separate skin surrounding the nail to allow 

for drainage.  Moderate amount of purulent drainage was expressed.  Patient 

tolerated well without immediate complication.





Discharge





- Discharge


Clinical Impression: 


 Paronychia of toe of left foot





Condition: Stable


Disposition: HOME, SELF-CARE


Instructions:  Paronychia ([facility])


Additional Instructions: 


Please return to the emergency department if you have any worsening, or concern 

of your symptoms.





Please return to the emergency department if you develop crease redness swelling

or pain at the affected site or fevers.





Please follow-up with your primary care physician in 2-3 days and any other 

recommended physicians.





If prescribed, take all medications as directed. 





If you have any questions or concerns do not hesitate to return the emergency 

department for evaluation.





Soak your foot in warm water for 15 minutes at a time twice a day to help with 

drainage of your paronychia





Prescriptions: 


Doxycycline Hyclate 100 mg PO BID #10 capsule


Referrals: 


RIKI ALAS DO [Primary Care Provider] - Follow up in 3-5 days

## 2019-11-04 ENCOUNTER — HOSPITAL ENCOUNTER (EMERGENCY)
Dept: HOSPITAL 62 - ER | Age: 54
Discharge: HOME | End: 2019-11-04
Payer: MEDICAID

## 2019-11-04 VITALS — SYSTOLIC BLOOD PRESSURE: 143 MMHG | DIASTOLIC BLOOD PRESSURE: 69 MMHG

## 2019-11-04 DIAGNOSIS — J45.909: ICD-10-CM
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
## 2019-11-04 DIAGNOSIS — M89.8X6: ICD-10-CM

## 2019-11-04 DIAGNOSIS — I10: ICD-10-CM

## 2019-11-04 DIAGNOSIS — E11.9: ICD-10-CM

## 2019-11-04 DIAGNOSIS — S93.402A: Primary | ICD-10-CM

## 2019-11-04 PROCEDURE — 99283 EMERGENCY DEPT VISIT LOW MDM: CPT

## 2019-11-04 PROCEDURE — 73590 X-RAY EXAM OF LOWER LEG: CPT

## 2019-11-04 NOTE — ER DOCUMENT REPORT
HPI





- HPI


Patient complains to provider of: sprained ankle


Time Seen by Provider: 11/04/19 19:51


Context: 





54-year-old female with diabetes presents to the emergency department with chief

complaint of a sprained left ankle sustained last week.  Patient states that she

sprained her ankle 2 months ago and came here and had a negative x-ray.  Patient

states that she twisted her foot last week and "was able to catch her ride with 

my daughter" to the emergency department because she is being seen for another 

reason.  That is what prompted the patient to seek emergency care.  Patient has 

not seen her primary doctor about this.  Patient is able to bear weight on it.  

Patient does complain of pain radiates up to her proximal fibula, denies any 

loss of sensation, denies any color changes in her skin.





- REPRODUCTIVE


Reproductive: DENIES: Pregnant:





Past Medical History





- Social History


Smoking Status: Unknown if Ever Smoked


Family History: Reviewed & Not Pertinent, CAD, CVA, DM, Hyperlipidemia, 

Hypertension, Malignancy, Thyroid Disfunction, Other





- Past Medical History


Cardiac Medical History: Reports: Hx Atrial Fibrillation, Hx Heart Attack - A-

FIB, Hx Hypercholesterolemia, Hx Hypertension


   Denies: Hx Coronary Artery Disease


Pulmonary Medical History: Reports: Hx Asthma, Hx Bronchitis


   Denies: Hx COPD, Hx Pneumonia


Neurological Medical History: Reports: Hx Migraine.  Denies: Hx Cerebrovascular 

Accident, Hx Seizures


Endocrine Medical History: Reports: Hx Diabetes Mellitus Type 2, Hx 

Hypothyroidism


Renal/ Medical History: Denies: Hx Peritoneal Dialysis


GI Medical History: Reports: Hx Gastritis, Hx Gastroesophageal Reflux Disease


Musculoskeletal Medical History: Reports Hx Arthritis, Reports Hx 

Musculoskeletal Trauma


Psychiatric Medical History: Reports: Hx Anxiety, Hx Depression, Hx 

Schizoaffective Disorder, Hx Schizophrenia - schizo affective


Past Surgical History: Reports: Hx Cholecystectomy, Hx Tubal Ligation.  Denies: 

Hx Hysterectomy





- Immunizations


Immunizations up to date: Yes


Hx Diphtheria, Pertussis, Tetanus Vaccination: Yes - 2013





[employer] Provider Document





- CONSTITUTIONAL


Notes: 





PHYSICAL EXAMINATION:





Reviewed vital signs and charting by RN





GENERAL: Alert, interacts well. No acute distress.


HEAD: Normocephalic, atraumatic.


EYES: Pupils equal and round. Extraocular movements intact.


ENT: Oral mucosa moist, tongue midline. 


NECK: Full range of motion. Trachea midline.


EXTREMITIES: Moves all 4 extremities spontaneously.  Mild edema over the 

anterior left lateral malleolus, DP pulse palpated, normal range of motion, 

tenderness to palpation over the anterior talofibular ligament,  No cyanosis.


PSYCH: Normal affect, normal mood.


SKIN: Warm, dry, normal turgor. No rashes or lesions noted.








- INFECTION CONTROL


TRAVEL OUTSIDE OF THE [place] IN LAST 30 DAYS: No





Course





- Re-evaluation


Re-evalutation: 





11/04/19 20:04


Well-appearing in no acute distress.  I am going to obtain imaging because 

patient does have proximal fibula pain and I want to ensure we are not missing a

fracture..


11/04/19 20:16





11/04/19 21:36


X-rays were negative for any fracture to include the proximal fibula and the 

lateral malleolus.  Patient again with a recurrent sprain and she will be placed

in an Ace wrap and discharged with follow-up.





- Vital Signs


Vital signs: 


                                        











Temp Pulse Resp BP Pulse Ox


 


 98.3 F   81   16   151/87 H  91 L


 


 11/04/19 19:46  11/04/19 19:46  11/04/19 19:46  11/04/19 19:46  11/04/19 19:46














Discharge





- Discharge


Clinical Impression: 


Left ankle sprain


Qualifiers:


 Encounter type: subsequent encounter Involved ligament of ankle: anterior 

talofibular ligament Qualified Code(s): S93.492D - Sprain of other ligament of 

left ankle, subsequent encounter





Condition: Good


Disposition: HOME, SELF-CARE


Additional Instructions: 


You have a recurrent sprain ankle of your right foot.  Keep your foot elevated, 

apply ice 20 minutes every 2 hours, and use the Ace wrap to help reduce the 

swelling.  You should take Tylenol 1000 mg every 6 hours as needed for pain.  

Please return if you have worsening pain and swelling, fever greater than 101, 

you notice spreading redness from the area, or have any other symptoms that are 

concerning to you.  Please follow-up with orthopedic surgery if your symptoms 

have not improved in the next 2-3 weeks.


Referrals: 


RIKI ALAS,  [Primary Care Provider] - Follow up as needed

## 2019-11-04 NOTE — RADIOLOGY REPORT (SQ)
2 VIEWS OF LEFT LEG



EXAM DATE: 11/4/2019 8:10 PM CST



HISTORY: fall, pain proximal fibula.



COMPARISON: None.



FINDINGS:



No acute fracture or dislocation is seen. The joint spaces are

preserved. No radiopaque foreign body is identified.



IMPRESSION:



No acute fracture or malalignment.

## 2019-12-15 ENCOUNTER — HOSPITAL ENCOUNTER (EMERGENCY)
Dept: HOSPITAL 62 - ER | Age: 54
Discharge: HOME | End: 2019-12-15
Payer: MEDICAID

## 2019-12-15 VITALS — SYSTOLIC BLOOD PRESSURE: 149 MMHG | DIASTOLIC BLOOD PRESSURE: 83 MMHG

## 2019-12-15 DIAGNOSIS — R10.9: ICD-10-CM

## 2019-12-15 DIAGNOSIS — I10: ICD-10-CM

## 2019-12-15 DIAGNOSIS — J45.909: ICD-10-CM

## 2019-12-15 DIAGNOSIS — R11.2: ICD-10-CM

## 2019-12-15 DIAGNOSIS — I48.91: ICD-10-CM

## 2019-12-15 DIAGNOSIS — E11.9: ICD-10-CM

## 2019-12-15 DIAGNOSIS — I25.2: ICD-10-CM

## 2019-12-15 DIAGNOSIS — R35.0: ICD-10-CM

## 2019-12-15 DIAGNOSIS — N30.00: Primary | ICD-10-CM

## 2019-12-15 LAB
ADD MANUAL DIFF: NO
ALBUMIN SERPL-MCNC: 4.6 G/DL (ref 3.5–5)
ALP SERPL-CCNC: 160 U/L (ref 38–126)
ANION GAP SERPL CALC-SCNC: 18 MMOL/L (ref 5–19)
APPEARANCE UR: (no result)
APTT PPP: YELLOW S
AST SERPL-CCNC: 41 U/L (ref 14–36)
BASOPHILS # BLD AUTO: 0.1 10^3/UL (ref 0–0.2)
BASOPHILS NFR BLD AUTO: 1 % (ref 0–2)
BILIRUB DIRECT SERPL-MCNC: 0.2 MG/DL (ref 0–0.4)
BILIRUB SERPL-MCNC: 0.8 MG/DL (ref 0.2–1.3)
BILIRUB UR QL STRIP: NEGATIVE
BUN SERPL-MCNC: 6 MG/DL (ref 7–20)
CALCIUM: 10.4 MG/DL (ref 8.4–10.2)
CHLORIDE SERPL-SCNC: 99 MMOL/L (ref 98–107)
CO2 SERPL-SCNC: 23 MMOL/L (ref 22–30)
EOSINOPHIL # BLD AUTO: 0.1 10^3/UL (ref 0–0.6)
EOSINOPHIL NFR BLD AUTO: 1 % (ref 0–6)
ERYTHROCYTE [DISTWIDTH] IN BLOOD BY AUTOMATED COUNT: 13.7 % (ref 11.5–14)
GLUCOSE SERPL-MCNC: 194 MG/DL (ref 75–110)
GLUCOSE UR STRIP-MCNC: NEGATIVE MG/DL
HCT VFR BLD CALC: 44.5 % (ref 36–47)
HGB BLD-MCNC: 14.5 G/DL (ref 12–15.5)
KETONES UR STRIP-MCNC: (no result) MG/DL
LYMPHOCYTES # BLD AUTO: 2.2 10^3/UL (ref 0.5–4.7)
LYMPHOCYTES NFR BLD AUTO: 25 % (ref 13–45)
MCH RBC QN AUTO: 31.5 PG (ref 27–33.4)
MCHC RBC AUTO-ENTMCNC: 32.7 G/DL (ref 32–36)
MCV RBC AUTO: 96 FL (ref 80–97)
MONOCYTES # BLD AUTO: 0.5 10^3/UL (ref 0.1–1.4)
MONOCYTES NFR BLD AUTO: 5.2 % (ref 3–13)
NEUTROPHILS # BLD AUTO: 5.9 10^3/UL (ref 1.7–8.2)
NEUTS SEG NFR BLD AUTO: 67.8 % (ref 42–78)
NITRITE UR QL STRIP: NEGATIVE
PH UR STRIP: 6 [PH] (ref 5–9)
PLATELET # BLD: 322 10^3/UL (ref 150–450)
POTASSIUM SERPL-SCNC: 4.7 MMOL/L (ref 3.6–5)
PROT SERPL-MCNC: 7.8 G/DL (ref 6.3–8.2)
PROT UR STRIP-MCNC: NEGATIVE MG/DL
RBC # BLD AUTO: 4.61 10^6/UL (ref 3.72–5.28)
SP GR UR STRIP: 1.01
TOTAL CELLS COUNTED % (AUTO): 100 %
UROBILINOGEN UR-MCNC: NEGATIVE MG/DL (ref ?–2)
WBC # BLD AUTO: 8.7 10^3/UL (ref 4–10.5)

## 2019-12-15 PROCEDURE — 87086 URINE CULTURE/COLONY COUNT: CPT

## 2019-12-15 PROCEDURE — 81001 URINALYSIS AUTO W/SCOPE: CPT

## 2019-12-15 PROCEDURE — 96361 HYDRATE IV INFUSION ADD-ON: CPT

## 2019-12-15 PROCEDURE — 99284 EMERGENCY DEPT VISIT MOD MDM: CPT

## 2019-12-15 PROCEDURE — 85025 COMPLETE CBC W/AUTO DIFF WBC: CPT

## 2019-12-15 PROCEDURE — 74176 CT ABD & PELVIS W/O CONTRAST: CPT

## 2019-12-15 PROCEDURE — 80053 COMPREHEN METABOLIC PANEL: CPT

## 2019-12-15 PROCEDURE — 87088 URINE BACTERIA CULTURE: CPT

## 2019-12-15 PROCEDURE — 96374 THER/PROPH/DIAG INJ IV PUSH: CPT

## 2019-12-15 PROCEDURE — 36415 COLL VENOUS BLD VENIPUNCTURE: CPT

## 2019-12-15 PROCEDURE — S0119 ONDANSETRON 4 MG: HCPCS

## 2019-12-15 NOTE — ER DOCUMENT REPORT
ED General





- General


Chief Complaint: Flank Pain


Stated Complaint: LEFT FLANK PAIN,VOMITING


Time Seen by Provider: 12/15/19 17:06


Primary Care Provider: 


RIKI ALAS DO [Primary Care Provider] - Follow up as needed


Notes: 





54-year-old female with past medical history of kidney failure, UTI, 

pyelonephritis presents with left flank pain nausea/vomiting, and chills that 

started this morning.  Patient also states she has been having some urinary 

frequency.  Patient states she does get UTIs pretty often.  Patient states that 

the left flank pain comes and goes.  Nothing seems to make it better, nothing 

seems to make it worse.  Patient denies any fevers, chest pain, diarrhea, 

abdominal pain.


TRAVEL OUTSIDE OF THE [place] IN LAST 30 DAYS: No





- Related Data


Allergies/Adverse Reactions: 


                                        





tramadol Allergy (Intermediate, Verified 12/15/19 17:06)


   AMS


Penicillins Allergy (Mild, Verified 12/15/19 17:06)


   rash


Sulfa (Sulfonamide Antibiotics) Allergy (Mild, Verified 12/15/19 17:06)


   rash


aspirin Allergy (Verified 12/15/19 17:06)


   NO ASA











Past Medical History





- Social History


Smoking Status: Never Smoker


Family History: Reviewed & Not Pertinent, CAD, CVA, DM, Hyperlipidemia, 

Hypertension, Malignancy, Thyroid Disfunction, Other


Patient has suicidal ideation: No


Patient has homicidal ideation: No





- Past Medical History


Cardiac Medical History: Reports: Hx Atrial Fibrillation, Hx Heart Attack - A-

FIB, Hx Hypercholesterolemia, Hx Hypertension


   Denies: Hx Coronary Artery Disease


Pulmonary Medical History: Reports: Hx Asthma, Hx Bronchitis


   Denies: Hx COPD, Hx Pneumonia


Neurological Medical History: Reports: Hx Migraine.  Denies: Hx Cerebrovascular 

Accident, Hx Seizures


Endocrine Medical History: Reports: Hx Diabetes Mellitus Type 2, Hx 

Hypothyroidism


Renal/ Medical History: Denies: Hx Peritoneal Dialysis


GI Medical History: Reports: Hx Gastritis, Hx Gastroesophageal Reflux Disease


Musculoskeletal Medical History: Reports Hx Arthritis, Reports Hx 

Musculoskeletal Trauma


Psychiatric Medical History: Reports: Hx Anxiety, Hx Depression, Hx 

Schizoaffective Disorder, Hx Schizophrenia - schizo affective


Past Surgical History: Reports: Hx Cholecystectomy, Hx Tubal Ligation.  Denies: 

Hx Hysterectomy





- Immunizations


Immunizations up to date: Yes


Hx Diphtheria, Pertussis, Tetanus Vaccination: Yes - 2013





Review of Systems





- Review of Systems


Notes: 





Constitutional: Positive for chills.  Negative for fever.


HENT: Negative for sore throat.


Eyes: Negative for visual changes.


Cardiovascular: Negative for chest pain.


Respiratory: Negative for shortness of breath.


Gastrointestinal: Positive for left flank pain.  Negative for abdominal pain, 

vomiting or diarrhea.


Genitourinary: Positive for frequency.  Negative for dysuria.


Musculoskeletal: Negative for back pain.


Skin: Negative for rash.


Neurological: Negative for headaches, weakness or numbness.





10 point ROS negative except as marked above and in HPI.





Physical Exam





- Vital signs


Vitals: 


                                        











Temp Pulse Resp BP Pulse Ox


 


 98.5 F   95   18   151/94 H  96 


 


 12/15/19 16:57  12/15/19 16:57  12/15/19 16:57  12/15/19 16:57  12/15/19 16:57














- Notes


Notes: 





GENERAL: Well-appearing, well-nourished and in no acute distress.


HEAD: Atraumatic, normocephalic.


EYES: Extraocular movements intact, sclera anicteric, conjunctiva are normal.


NECK: Normal range of motion, supple without lymphadenopathy or JVD.


LUNGS: Breath sounds clear to auscultation bilaterally and equal.  No wheezes 

rales or rhonchi.


HEART: Regular rate and rhythm without murmurs, rubs or gallops.


ABDOMEN: Soft, nontender.  Mild left CVA tenderness.  No guarding, no rebound.  

No masses appreciated.


EXTREMITIES: Normal range of motion, no pitting or edema.  No clubbing or 

cyanosis.


NEUROLOGICAL: Cranial nerves II through XII grossly intact.  Normal speech, 

normal gait.


PSYCH: Normal mood, normal affect.


SKIN: Warm, Dry, normal turgor, no rashes or lesions noted.





Course





- Re-evaluation


Re-evalutation: 





12/15/19 54-year-old female presents with left flank pain, nausea/vomiting, 

chills, urinary frequency since this morning.  Patient has a history of kidney 

failure, kidney infection, UTIs.  Patient denies any history of kidney stones.  

UA does show trace ketones and trace leukocyte Estrace.  Lab work is otherwise 

unremarkable, AST is mildly elevated as is alk phos.  CT abdomen/pelvis without 

contrast shows no acute findings.  Discussed all results with patient.  Printed 

out copy of CT results and discussed them with the patient.  We will treat 

patient for possible UTI/possible pyelo with Keflex.  Patient sent home with 

Zofran and Pyridium for pain control.  Patient instructed that Pyridium will 

make urine red/orange.  Strict return precautions given.  All questions/concerns

addressed prior to discharge.





- Vital Signs


Vital signs: 


                                        











Temp Pulse Resp BP Pulse Ox


 


 98.5 F   95   18   151/94 H  96 


 


 12/15/19 17:06  12/15/19 17:06  12/15/19 17:06  12/15/19 17:06  12/15/19 17:06














- Laboratory


Result Diagrams: 


                                 12/15/19 18:05





                                 12/15/19 18:05


Laboratory results interpreted by me: 


                                        











  12/15/19 12/15/19





  18:00 18:05


 


BUN   6 L


 


Glucose   194 H


 


Calcium   10.4 H


 


AST   41 H


 


Alkaline Phosphatase   160 H


 


Urine Ketones  TRACE H 


 


Ur Leukocyte Esterase  TRACE H 














Discharge





- Discharge


Clinical Impression: 


 Left flank pain





UTI (urinary tract infection)


Qualifiers:


 Urinary tract infection type: acute cystitis Hematuria presence: without 

hematuria Qualified Code(s): N30.00 - Acute cystitis without hematuria





Nausea & vomiting


Qualifiers:


 Vomiting type: unspecified Vomiting Intractability: unspecified Qualified 

Code(s): R11.2 - Nausea with vomiting, unspecified





Condition: Stable


Disposition: HOME, SELF-CARE


Instructions:  Antinausea Medication ([facility]), Urinary Tract Infection ([facility])


Additional Instructions: 


Please take Keflex as prescribed and finish all doses unless we call you to 

change the antibiotic based off your urine culture.  Please take Pyridium as 

prescribed.  Please note that it will turn your urine red/orange.  Please take 

Zofran as needed for nausea/vomiting.  Please follow-up with your primary care 

doctors in 3 to 5 days.  Return to ER immediately if you start having any 

worsening symptoms, including vomiting not controlled by medication, fever, 

abdominal pain, worsening pain, burning with pain, difficulty with urinating, or

any other symptoms that are concerning to you.


Prescriptions: 


Cephalexin Monohydrate [Keflex 500 mg Capsule] 500 mg PO Q6H 10 Days #40 capsule


Phenazopyridine HCl [Pyridium 200 mg Tablet] 200 mg PO TID #15 tablet


Referrals: 


RIKI ALAS DO [Primary Care Provider] - Follow up in 3-5 days

## 2019-12-15 NOTE — RADIOLOGY REPORT (SQ)
EXAM DESCRIPTION:  CT ABD/PELVIS NO ORAL OR IV



COMPLETED DATE/TIME:  12/15/2019 5:34 pm



REASON FOR STUDY:  flank pain



COMPARISON:  12/20/2017



TECHNIQUE:  CT scan of the abdomen and pelvis performed without intravenous or oral contrast. Images 
reviewed with lung, soft tissue, and bone windows. Reconstructed coronal and sagittal MPR images revi
ewed. All images stored on PACS.

All CT scanners at this facility use dose modulation, iterative reconstruction, and/or weight based d
osing when appropriate to reduce radiation dose to as low as reasonably achievable (ALARA).

[facility]: Dose Right  [facility]: CareDose    [facility]: Dose Right    [facility]: Teradose 4D    [facility]: Smart Technologies



RADIATION DOSE:  CT Rad equipment meets quality standard of care and radiation dose reduction techniq
ues were employed. CTDIvol: 19.8 mGy. DLP: 1041 mGy-cm.mGy.



LIMITATIONS:  None.



FINDINGS:  LOWER CHEST: No significant findings. No nodules or infiltrates.

NON-CONTRASTED LIVER, SPLEEN, ADRENALS: Evaluation limited by lack of IV contrast. No identified sign
ificant masses.

PANCREAS: No masses. No peripancreatic inflammatory changes.

GALLBLADDER: Surgically absent.

RIGHT KIDNEY AND URETER:  Pelvic location.  No cysts identified. No solid masses. No calcified stones
. No hydronephrosis or hydroureter.

LEFT KIDNEY AND URETER: No cysts identified. No solid masses. No calcified stones. No hydronephrosis 
or hydroureter.

AORTA AND RETROPERITONEUM: No aneurysm. No retroperitoneal masses or adenopathy.

BOWEL AND PERITONEAL CAVITY: No obvious masses or inflammatory changes. No free fluid.

APPENDIX: Normal.

PELVIS, BLADDER, AND ABDOMINAL WALL:No abnormal masses. No free fluid. Unremarkable bladder.

BONES: No acute findings.

OTHER: No other significant finding.



IMPRESSION:  NO ACUTE FINDINGS.



TECHNICAL DOCUMENTATION:  JOB ID:  [id number]

[id number]

Quality ID # [id number]: Final reports with documentation of one or more dose reduction techniques (e.g., Au
tomated exposure control, adjustment of the mA and/or kV according to patient size, use of iterative 
reconstruction technique)

 2011 [employer]- All Rights Reserved



Reading location - IP/workstation name: [employer]

## 2019-12-15 NOTE — ER DOCUMENT REPORT
ED Medical Screen (RME)





- General


Chief Complaint: Flank Pain


Stated Complaint: LEFT FLANK PAIN,VOMITING


Time Seen by Provider: 12/15/19 17:06


Primary Care Provider: 


RIKI ALAS DO [Primary Care Provider] - Follow up as needed


TRAVEL OUTSIDE OF THE [place] IN LAST 30 DAYS: No





- HPI


Notes: 





12/15/19 17:11


54 year old female to the ED with C/O left flank pain that began this morning.  

She admits to associated NV.  Admits to chills.  Denies hx of kidney stones.  

Denies any urinary frequency or dysuria.   She has not taken anything for her 

pain.








I performed a brief medical screening exam on the patient and determined that 

the patient will need further management by mainside provider.   I have placed 

initial orders to help expedite the patient's care today.





- Related Data


Allergies/Adverse Reactions: 


                                        





tramadol Allergy (Intermediate, Verified 12/15/19 17:06)


   AMS


Penicillins Allergy (Mild, Verified 12/15/19 17:06)


   rash


Sulfa (Sulfonamide Antibiotics) Allergy (Mild, Verified 12/15/19 17:06)


   rash


aspirin Allergy (Verified 12/15/19 17:06)


   NO ASA











Past Medical History





- Past Medical History


Cardiac Medical History: Reports: Hx Atrial Fibrillation, Hx Heart Attack - A-

FIB, Hx Hypercholesterolemia, Hx Hypertension


   Denies: Hx Coronary Artery Disease


Pulmonary Medical History: Reports: Hx Asthma, Hx Bronchitis


   Denies: Hx COPD, Hx Pneumonia


Neurological Medical History: Reports: Hx Migraine.  Denies: Hx Cerebrovascular 

Accident, Hx Seizures


Endocrine Medical History: Reports: Hx Diabetes Mellitus Type 2, Hx 

Hypothyroidism


Renal/ Medical History: Denies: Hx Peritoneal Dialysis


GI Medical History: Reports: Hx Gastritis, Hx Gastroesophageal Reflux Disease


Musculoskeltal Medical History: Reports Hx Arthritis, Reports Hx Musculoskeletal

Trauma


Psychiatric Medical History: Reports: Hx Anxiety, Hx Depression, Hx 

Schizoaffective Disorder, Hx Schizophrenia - schizo affective


Past Surgical History: Reports: Hx Cholecystectomy, Hx Tubal Ligation.  Denies: 

Hx Hysterectomy





- Immunizations


Immunizations up to date: Yes


Hx Diphtheria, Pertussis, Tetanus Vaccination: Yes - 2013





Physical Exam





- Vital signs


Vitals: 





                                        











Temp Pulse Resp BP Pulse Ox


 


 98.5 F   95   18   151/94 H  96 


 


 12/15/19 16:57  12/15/19 16:57  12/15/19 16:57  12/15/19 16:57  12/15/19 16:57














Course





- Vital Signs


Vital signs: 





                                        











Temp Pulse Resp BP Pulse Ox


 


 98.5 F   95   18   151/94 H  96 


 


 12/15/19 16:57  12/15/19 16:57  12/15/19 16:57  12/15/19 16:57  12/15/19 16:57














Doctor's Discharge





- Discharge


Referrals: 


RIKI ALAS DO [Primary Care Provider] - Follow up as needed

## 2020-03-21 ENCOUNTER — HOSPITAL ENCOUNTER (INPATIENT)
Dept: HOSPITAL 62 - ER | Age: 55
LOS: 10 days | Discharge: HOME | DRG: 189 | End: 2020-03-31
Attending: INTERNAL MEDICINE | Admitting: FAMILY MEDICINE
Payer: MEDICAID

## 2020-03-21 DIAGNOSIS — N30.00: ICD-10-CM

## 2020-03-21 DIAGNOSIS — I50.33: ICD-10-CM

## 2020-03-21 DIAGNOSIS — E78.00: ICD-10-CM

## 2020-03-21 DIAGNOSIS — Z82.3: ICD-10-CM

## 2020-03-21 DIAGNOSIS — Z88.2: ICD-10-CM

## 2020-03-21 DIAGNOSIS — Z87.891: ICD-10-CM

## 2020-03-21 DIAGNOSIS — E11.9: ICD-10-CM

## 2020-03-21 DIAGNOSIS — Z79.890: ICD-10-CM

## 2020-03-21 DIAGNOSIS — B95.1: ICD-10-CM

## 2020-03-21 DIAGNOSIS — Z88.0: ICD-10-CM

## 2020-03-21 DIAGNOSIS — I11.0: ICD-10-CM

## 2020-03-21 DIAGNOSIS — J96.01: Primary | ICD-10-CM

## 2020-03-21 DIAGNOSIS — F41.1: ICD-10-CM

## 2020-03-21 DIAGNOSIS — Z03.818: ICD-10-CM

## 2020-03-21 DIAGNOSIS — I48.0: ICD-10-CM

## 2020-03-21 DIAGNOSIS — Z88.6: ICD-10-CM

## 2020-03-21 DIAGNOSIS — Z80.9: ICD-10-CM

## 2020-03-21 DIAGNOSIS — I25.2: ICD-10-CM

## 2020-03-21 DIAGNOSIS — E78.5: ICD-10-CM

## 2020-03-21 DIAGNOSIS — Z79.01: ICD-10-CM

## 2020-03-21 DIAGNOSIS — E66.01: ICD-10-CM

## 2020-03-21 DIAGNOSIS — K21.9: ICD-10-CM

## 2020-03-21 DIAGNOSIS — Z83.3: ICD-10-CM

## 2020-03-21 DIAGNOSIS — R07.81: ICD-10-CM

## 2020-03-21 DIAGNOSIS — F32.9: ICD-10-CM

## 2020-03-21 DIAGNOSIS — F25.9: ICD-10-CM

## 2020-03-21 DIAGNOSIS — E03.9: ICD-10-CM

## 2020-03-21 DIAGNOSIS — Z79.899: ICD-10-CM

## 2020-03-21 LAB
A TYPE INFLUENZA AG: NEGATIVE
ADD MANUAL DIFF: NO
ALBUMIN SERPL-MCNC: 4.1 G/DL (ref 3.5–5)
ALP SERPL-CCNC: 146 U/L (ref 38–126)
ANION GAP SERPL CALC-SCNC: 11 MMOL/L (ref 5–19)
AST SERPL-CCNC: 29 U/L (ref 14–36)
B INFLUENZA AG: NEGATIVE
BASE EXCESS BLDV CALC-SCNC: -2.3 MMOL/L
BASOPHILS # BLD AUTO: 0 10^3/UL (ref 0–0.2)
BASOPHILS NFR BLD AUTO: 0.3 % (ref 0–2)
BILIRUB DIRECT SERPL-MCNC: 0.3 MG/DL (ref 0–0.4)
BILIRUB SERPL-MCNC: 0.5 MG/DL (ref 0.2–1.3)
BUN SERPL-MCNC: 11 MG/DL (ref 7–20)
CALCIUM: 9.1 MG/DL (ref 8.4–10.2)
CHLORIDE SERPL-SCNC: 97 MMOL/L (ref 98–107)
CK MB SERPL-MCNC: 1.61 NG/ML (ref ?–4.55)
CK SERPL-CCNC: 120 U/L (ref 30–135)
CO2 SERPL-SCNC: 27 MMOL/L (ref 22–30)
EOSINOPHIL # BLD AUTO: 0.2 10^3/UL (ref 0–0.6)
EOSINOPHIL NFR BLD AUTO: 1.6 % (ref 0–6)
ERYTHROCYTE [DISTWIDTH] IN BLOOD BY AUTOMATED COUNT: 13.9 % (ref 11.5–14)
GLUCOSE SERPL-MCNC: 247 MG/DL (ref 75–110)
HCO3 BLDV-SCNC: 24.5 MMOL/L (ref 20–32)
HCT VFR BLD CALC: 43.4 % (ref 36–47)
HGB BLD-MCNC: 14.4 G/DL (ref 12–15.5)
INR PPP: 0.99
LYMPHOCYTES # BLD AUTO: 2.2 10^3/UL (ref 0.5–4.7)
LYMPHOCYTES NFR BLD AUTO: 15.7 % (ref 13–45)
MCH RBC QN AUTO: 32.3 PG (ref 27–33.4)
MCHC RBC AUTO-ENTMCNC: 33.2 G/DL (ref 32–36)
MCV RBC AUTO: 97 FL (ref 80–97)
MONOCYTES # BLD AUTO: 0.8 10^3/UL (ref 0.1–1.4)
MONOCYTES NFR BLD AUTO: 5.5 % (ref 3–13)
NEUTROPHILS # BLD AUTO: 10.7 10^3/UL (ref 1.7–8.2)
NEUTS SEG NFR BLD AUTO: 76.9 % (ref 42–78)
PCO2 BLDV: 50 MMHG (ref 35–63)
PH BLDV: 7.31 [PH] (ref 7.3–7.42)
PLATELET # BLD: 316 10^3/UL (ref 150–450)
POTASSIUM SERPL-SCNC: 4.8 MMOL/L (ref 3.6–5)
PROT SERPL-MCNC: 7 G/DL (ref 6.3–8.2)
PROTHROMBIN TIME: 13.1 SEC (ref 11.4–15.4)
RBC # BLD AUTO: 4.47 10^6/UL (ref 3.72–5.28)
TOTAL CELLS COUNTED % (AUTO): 100 %
TROPONIN I SERPL-MCNC: < 0.012 NG/ML
WBC # BLD AUTO: 13.9 10^3/UL (ref 4–10.5)

## 2020-03-21 PROCEDURE — 96365 THER/PROPH/DIAG IV INF INIT: CPT

## 2020-03-21 PROCEDURE — 82550 ASSAY OF CK (CPK): CPT

## 2020-03-21 PROCEDURE — 99291 CRITICAL CARE FIRST HOUR: CPT

## 2020-03-21 PROCEDURE — 83735 ASSAY OF MAGNESIUM: CPT

## 2020-03-21 PROCEDURE — 84481 FREE ASSAY (FT-3): CPT

## 2020-03-21 PROCEDURE — 80162 ASSAY OF DIGOXIN TOTAL: CPT

## 2020-03-21 PROCEDURE — 84484 ASSAY OF TROPONIN QUANT: CPT

## 2020-03-21 PROCEDURE — 85025 COMPLETE CBC W/AUTO DIFF WBC: CPT

## 2020-03-21 PROCEDURE — 82962 GLUCOSE BLOOD TEST: CPT

## 2020-03-21 PROCEDURE — 87635 SARS-COV-2 COVID-19 AMP PRB: CPT

## 2020-03-21 PROCEDURE — 71275 CT ANGIOGRAPHY CHEST: CPT

## 2020-03-21 PROCEDURE — 36415 COLL VENOUS BLD VENIPUNCTURE: CPT

## 2020-03-21 PROCEDURE — 87077 CULTURE AEROBIC IDENTIFY: CPT

## 2020-03-21 PROCEDURE — 81001 URINALYSIS AUTO W/SCOPE: CPT

## 2020-03-21 PROCEDURE — 87088 URINE BACTERIA CULTURE: CPT

## 2020-03-21 PROCEDURE — 82803 BLOOD GASES ANY COMBINATION: CPT

## 2020-03-21 PROCEDURE — 87186 SC STD MICRODIL/AGAR DIL: CPT

## 2020-03-21 PROCEDURE — 82565 ASSAY OF CREATININE: CPT

## 2020-03-21 PROCEDURE — 83036 HEMOGLOBIN GLYCOSYLATED A1C: CPT

## 2020-03-21 PROCEDURE — 96375 TX/PRO/DX INJ NEW DRUG ADDON: CPT

## 2020-03-21 PROCEDURE — 99292 CRITICAL CARE ADDL 30 MIN: CPT

## 2020-03-21 PROCEDURE — 85610 PROTHROMBIN TIME: CPT

## 2020-03-21 PROCEDURE — 93005 ELECTROCARDIOGRAM TRACING: CPT

## 2020-03-21 PROCEDURE — 96376 TX/PRO/DX INJ SAME DRUG ADON: CPT

## 2020-03-21 PROCEDURE — 83880 ASSAY OF NATRIURETIC PEPTIDE: CPT

## 2020-03-21 PROCEDURE — 87804 INFLUENZA ASSAY W/OPTIC: CPT

## 2020-03-21 PROCEDURE — 80053 COMPREHEN METABOLIC PANEL: CPT

## 2020-03-21 PROCEDURE — 94660 CPAP INITIATION&MGMT: CPT

## 2020-03-21 PROCEDURE — 87040 BLOOD CULTURE FOR BACTERIA: CPT

## 2020-03-21 PROCEDURE — 87070 CULTURE OTHR SPECIMN AEROBIC: CPT

## 2020-03-21 PROCEDURE — 85730 THROMBOPLASTIN TIME PARTIAL: CPT

## 2020-03-21 PROCEDURE — 80048 BASIC METABOLIC PNL TOTAL CA: CPT

## 2020-03-21 PROCEDURE — 71045 X-RAY EXAM CHEST 1 VIEW: CPT

## 2020-03-21 PROCEDURE — 80202 ASSAY OF VANCOMYCIN: CPT

## 2020-03-21 PROCEDURE — 93010 ELECTROCARDIOGRAM REPORT: CPT

## 2020-03-21 PROCEDURE — 87086 URINE CULTURE/COLONY COUNT: CPT

## 2020-03-21 PROCEDURE — 83605 ASSAY OF LACTIC ACID: CPT

## 2020-03-21 PROCEDURE — 84443 ASSAY THYROID STIM HORMONE: CPT

## 2020-03-21 PROCEDURE — 82553 CREATINE MB FRACTION: CPT

## 2020-03-21 NOTE — ER DOCUMENT REPORT
Entered by CIARA IYER SCRIBE  03/21/20 2117 





Acting as scribe for:LA AGUIRRE IV, MD





ED General





- General


Chief Complaint: Breathing Difficulty


Stated Complaint: DIFFICULTY BREATHING


Time Seen by Provider: 03/21/20 21:17


Primary Care Provider: 


RIKI ALAS DO [Primary Care Provider] - Follow up as needed


Mode of Arrival: Medic


Information source: Patient


Notes: 





This 54 year old female patient with a history of A fib with RVR brought in by 

EMS presents to the ED today with complaints of dyspnea and cough that started 

prior to arrival. Patient states that her chest started hurting around 1700 

today because she was coughing so hard. Patient reports a "rattling" sound in 

her chest and swelling to her face, hands, and feet. Patient states that "I 

think my ribs are inflamed too." Patient notes that she has had flu exposure fro

m someone who was admitted x3-4 days ago.


TRAVEL OUTSIDE OF THE [place] IN LAST 30 DAYS: No





- Related Data


Allergies/Adverse Reactions: 


                                        





tramadol Allergy (Intermediate, Verified 12/15/19 17:06)


   AMS


Penicillins Allergy (Mild, Verified 12/15/19 17:06)


   rash


Sulfa (Sulfonamide Antibiotics) Allergy (Mild, Verified 12/15/19 17:06)


   rash


aspirin Allergy (Verified 12/15/19 17:06)


   NO ASA











Past Medical History





- General


Information source: Patient, [facility] Records





- Social History


Smoking Status: Former Smoker


Cigarette use (# per day): No


Chew tobacco use (# tins/day): No


Smoking Education Provided: No


Frequency of alcohol use: Occasional


Drug Abuse: None


Family History: Reviewed & Not Pertinent, CAD, CVA, DM, Hyperlipidemia, 

Hypertension, Malignancy, Thyroid Disfunction, Other


Patient has suicidal ideation: No


Patient has homicidal ideation: No





- Past Medical History


Cardiac Medical History: Reports: Hx Atrial Fibrillation, Hx Heart Attack - A-

FIB, Hx Hypercholesterolemia, Hx Hypertension


Pulmonary Medical History: Reports: Hx Asthma, Hx Bronchitis


Neurological Medical History: Reports: Hx Migraine


Endocrine Medical History: Reports: Hx Diabetes Mellitus Type 2, Hx 

Hypothyroidism


GI Medical History: Reports: Hx Gastritis, Hx Gastroesophageal Reflux Disease


Musculoskeletal Medical History: Reports Hx Arthritis, Reports Hx 

Musculoskeletal Trauma


Psychiatric Medical History: Reports: Hx Anxiety, Hx Depression, Hx 

Schizoaffective Disorder, Hx Schizophrenia - schizo affective


Past Surgical History: Reports: Hx Cholecystectomy, Hx Tubal Ligation





- Immunizations


Immunizations up to date: Yes


Hx Diphtheria, Pertussis, Tetanus Vaccination: Yes - 2013





Review of Systems





- Review of Systems


Constitutional: See HPI.  denies: Fever


EENT: No symptoms reported


Cardiovascular: See HPI, Chest pain - reproducible, Dyspnea


Respiratory: See HPI, Cough


Gastrointestinal: No symptoms reported


Genitourinary: No symptoms reported


Female Genitourinary: No symptoms reported


Musculoskeletal: See HPI, Leg swelling, Other - Face and hand swelling


Skin: No symptoms reported


Hematologic/Lymphatic: No symptoms reported


Neurological/Psychological: No symptoms reported


-: Yes All other systems reviewed and negative





Physical Exam





- Vital signs


Vitals: 


                                        











Temp Pulse Resp Pulse Ox


 


 98.3 F   147 H  29 H  91 L


 


 03/21/20 21:09  03/21/20 21:09  03/21/20 21:09  03/21/20 21:09














- General


General appearance: Alert





- HEENT


Head: Normocephalic, Atraumatic


Eyes: Normal


Pupils: PERRL





- Respiratory


Respiratory status: No respiratory distress


Chest status: Nontender


Breath sounds: Rhonchi - Diffusely rhonchorous


Chest palpation: Normal





- Cardiovascular


Rhythm: Regular, Tachycardia.  No: Irregularly irregular


Heart sounds: Normal auscultation


Murmur: No


Friction rub: No


Gallop: None auscultated





- Abdominal


Inspection: Normal


Distension: No distension


Bowel sounds: Normal


Tenderness: Nontender - Abdomen soft


Organomegaly: No organomegaly





- Back


Back: Normal, Nontender





- Extremities


General upper extremity: Normal inspection


General lower extremity: Normal inspection.  No: Edema





- Neurological


Neuro grossly intact: Yes





- Psychological


Associated symptoms: Normal affect, Normal mood





- Skin


Skin Temperature: Warm


Skin Moisture: Dry


Skin Color: Normal





Course





- Re-evaluation


Re-evalutation: 





03/22/20 02:03


Results of ED MSE discussed with patient.  All questions were answered.





- Vital Signs


Vital signs: 


                                        











Temp Pulse Resp BP Pulse Ox


 


 99.2 F   147 H  29 H     97 


 


 03/22/20 02:03  03/21/20 21:09  03/21/20 21:09     03/21/20 21:14














- Laboratory


Result Diagrams: 


                                 03/21/20 21:18





                                 03/21/20 22:48


Laboratory results interpreted by me: 


                                        











  03/21/20 03/21/20 03/21/20





  21:18 21:42 22:48


 


WBC  13.9 H  


 


Absolute Neuts (auto)  10.7 H  


 


Sodium    135.0 L


 


Chloride    97 L


 


Glucose    247 H


 


Lactic Acid   2.5 H 


 


Magnesium    1.4 L


 


Alkaline Phosphatase    146 H


 


NT-Pro-B Natriuret Pep   


 


TSH   


 


Urine Protein   


 


Urine Glucose (UA)   


 


Urine Blood   


 


Leukocyte Esterase Rfl   














  03/21/20 03/21/20 03/22/20





  22:48 22:48 01:21


 


WBC   


 


Absolute Neuts (auto)   


 


Sodium   


 


Chloride   


 


Glucose   


 


Lactic Acid   


 


Magnesium   


 


Alkaline Phosphatase   


 


NT-Pro-B Natriuret Pep   831 H 


 


TSH  7.75 H  


 


Urine Protein    100 H


 


Urine Glucose (UA)    150 H


 


Urine Blood    SMALL H


 


Leukocyte Esterase Rfl    MODERATE H














- Diagnostic Test


Radiology reviewed: Reports reviewed





- EKG Interpretation by Me


Additional EKG results interpreted by me: 





03/22/20 02:04


EKG obtained on 3/21/2020 at 2107 hrs. was interpreted by this MD.  Findings: 

Atrial fibrillation with a rate of 155, no obvious P waves are seen, QRScomplex 

l appears narrow, there are no obvious visible patterns of ST segment elevation 

or depression to suggest acute myocardial ischemia or infarction.  Impression A.

fib with RVR





- Consults


  ** Dr. Paul Weiland


Time consulted: 02:00


Reason for consultation: 





03/22/20 02:06


a fib with rvr, leukocytosis


Consulted provider: will see as inpatient





Critical Care Note





- Critical Care Note


Total time excluding time spent on procedures (mins): 120 - a fib with rvr





Discharge





- Discharge


Clinical Impression: 


 Atrial fibrillation with RVR





Leukocytosis, unspecified


Qualifiers:


 Leukocytosis type: unspecified Qualified Code(s): D72.829 - Elevated white 

blood cell count, unspecified





Condition: Good


Disposition: ADMITTED AS INPATIENT


Unit Admitted: Medical Floor


Referrals: 


RIKI ALAS DO [Primary Care Provider] - Follow up as needed





I personally performed the services described in the documentation, reviewed and

edited the documentation which was dictated to the scribe in my presence, and it

accurately records my words and actions.

## 2020-03-21 NOTE — RADIOLOGY REPORT (SQ)
EXAM DESCRIPTION:

X-RAY CHEST- One View



CLINICAL HISTORY:

Shortness of breath



COMPARISON:

November 21, 2018



TECHNIQUE:

Single view of the chest.



FINDINGS:

There are overlying EKG leads.

There are mild patchy bibasilar opacities, similar to prior

study.



The pulmonary vascularity is prominent in appearance.

The cardiomediastinal silhouette is stable in size.



Osseous structures are stable.



IMPRESSION:

Mild patchy basilar opacities are similar in appearance to prior

study. Clinical correlation is advised. The possibility of

infection is not excluded.

## 2020-03-22 LAB
APPEARANCE UR: (no result)
APTT PPP: YELLOW S
BILIRUB UR QL STRIP: NEGATIVE
GLUCOSE UR STRIP-MCNC: 150 MG/DL
KETONES UR STRIP-MCNC: NEGATIVE MG/DL
PH UR STRIP: 5 [PH] (ref 5–9)
PROT UR STRIP-MCNC: 100 MG/DL
SP GR UR STRIP: > 1.06
UROBILINOGEN UR-MCNC: NEGATIVE MG/DL (ref ?–2)

## 2020-03-22 PROCEDURE — 5A09557 ASSISTANCE WITH RESPIRATORY VENTILATION, GREATER THAN 96 CONSECUTIVE HOURS, CONTINUOUS POSITIVE AIRWAY PRESSURE: ICD-10-PCS | Performed by: FAMILY MEDICINE

## 2020-03-22 RX ADMIN — APIXABAN SCH MG: 5 TABLET, FILM COATED ORAL at 09:13

## 2020-03-22 RX ADMIN — LEVALBUTEROL HYDROCHLORIDE SCH MG: 1.25 SOLUTION RESPIRATORY (INHALATION) at 14:07

## 2020-03-22 RX ADMIN — Medication SCH: at 13:12

## 2020-03-22 RX ADMIN — Medication SCH: at 21:20

## 2020-03-22 RX ADMIN — APIXABAN SCH MG: 5 TABLET, FILM COATED ORAL at 17:08

## 2020-03-22 RX ADMIN — MAGNESIUM OXIDE TAB 400 MG (241.3 MG ELEMENTAL MG) SCH MG: 400 (241.3 MG) TAB at 17:08

## 2020-03-22 RX ADMIN — SODIUM CHLORIDE, SODIUM LACTATE, POTASSIUM CHLORIDE, AND CALCIUM CHLORIDE PRN MLS/HR: .6; .31; .03; .02 INJECTION, SOLUTION INTRAVENOUS at 05:51

## 2020-03-22 RX ADMIN — METOPROLOL SUCCINATE SCH MG: 25 TABLET, EXTENDED RELEASE ORAL at 21:13

## 2020-03-22 RX ADMIN — INSULIN HUMAN SCH UNIT: 100 INJECTION, SOLUTION PARENTERAL at 08:40

## 2020-03-22 RX ADMIN — PANTOPRAZOLE SODIUM SCH MG: 40 TABLET, DELAYED RELEASE ORAL at 17:08

## 2020-03-22 RX ADMIN — DOCUSATE SODIUM SCH MG: 100 CAPSULE, LIQUID FILLED ORAL at 17:08

## 2020-03-22 RX ADMIN — SODIUM CHLORIDE, SODIUM LACTATE, POTASSIUM CHLORIDE, AND CALCIUM CHLORIDE PRN MLS/HR: .6; .31; .03; .02 INJECTION, SOLUTION INTRAVENOUS at 08:36

## 2020-03-22 RX ADMIN — MAGNESIUM OXIDE TAB 400 MG (241.3 MG ELEMENTAL MG) SCH MG: 400 (241.3 MG) TAB at 09:13

## 2020-03-22 RX ADMIN — LEVALBUTEROL HYDROCHLORIDE SCH MG: 1.25 SOLUTION RESPIRATORY (INHALATION) at 20:24

## 2020-03-22 RX ADMIN — INSULIN HUMAN SCH UNIT: 100 INJECTION, SOLUTION PARENTERAL at 12:32

## 2020-03-22 RX ADMIN — Medication SCH: at 05:28

## 2020-03-22 RX ADMIN — HYDROCODONE BITARTRATE AND ACETAMINOPHEN PRN TAB: 5; 325 TABLET ORAL at 20:16

## 2020-03-22 RX ADMIN — INSULIN HUMAN SCH: 100 INJECTION, SOLUTION PARENTERAL at 21:20

## 2020-03-22 RX ADMIN — TOLTERODINE TARTRATE SCH MG: 1 TABLET, FILM COATED ORAL at 21:14

## 2020-03-22 RX ADMIN — DIGOXIN SCH MG: 0.25 TABLET ORAL at 10:14

## 2020-03-22 RX ADMIN — INSULIN HUMAN SCH UNIT: 100 INJECTION, SOLUTION PARENTERAL at 17:08

## 2020-03-22 RX ADMIN — DOCUSATE SODIUM SCH MG: 100 CAPSULE, LIQUID FILLED ORAL at 09:13

## 2020-03-22 RX ADMIN — MAGNESIUM SULFATE IN DEXTROSE SCH MLS/HR: 10 INJECTION, SOLUTION INTRAVENOUS at 01:34

## 2020-03-22 RX ADMIN — BUDESONIDE SCH MG: 0.5 SUSPENSION RESPIRATORY (INHALATION) at 20:23

## 2020-03-22 RX ADMIN — METOPROLOL SUCCINATE SCH MG: 25 TABLET, EXTENDED RELEASE ORAL at 10:14

## 2020-03-22 RX ADMIN — IPRATROPIUM BROMIDE SCH MG: 0.5 SOLUTION RESPIRATORY (INHALATION) at 14:07

## 2020-03-22 RX ADMIN — PANTOPRAZOLE SODIUM SCH MG: 40 TABLET, DELAYED RELEASE ORAL at 05:48

## 2020-03-22 RX ADMIN — IPRATROPIUM BROMIDE SCH MG: 0.5 SOLUTION RESPIRATORY (INHALATION) at 20:24

## 2020-03-22 RX ADMIN — MAGNESIUM SULFATE IN DEXTROSE SCH MLS/HR: 10 INJECTION, SOLUTION INTRAVENOUS at 00:52

## 2020-03-22 NOTE — PDOC H&P
History of Present Illness


Admission Date/PCP: 


03/22/20 02:20


RIKI ALAS DO


Patient complains of: Cough


History of Present Illness: 


EVERARDO GEORGE is a 54 year old female who presented to the emergency room with 

an acute cough.  She admits the sudden development of a severe paroxysmal cough 

at approximately 5 PM on the day prior to admission.  Her cough caused her to 

have severe pain in her ribs/chest bilaterally.  Her cough was accompanied by 

moderate to severe dyspnea and associated with mild swelling in her hands and 

feet.  She denies other associated or accompanying signs and symptoms.  She 

denies prior similar episodes.  She admits exposure to a person who had active 

influenza 72 to 96 hours prior to her presentation.  She has not identified any 

aggravating or ameliorating factors for her cough.  In the emergency room she 

was found to have an elevated white blood count at 13,900 and a elevated lactic 

acid of 2.5.  She was noted to be tachypneic, tachycardic (A. fib with RVR) and 

hypoxic on evaluation.  She was treated with IV diltiazem using a bolus and 

continuous infusion for her tachycardia without significant improvement.  He was

subsequently given IV metoprolol also with a less than desired response.  She 

also received initial antibiotic therapy in the emergency room for a possible 

pneumonia although no pneumonia was noted on her pulmonary evaluations including

a CT scan.  A coronavirus screening was administered in the ER.  Patient was 

subsequently admitted to the hospital for further evaluation and treatment.





Past Medical History


Cardiac Medical History: Reports: Atrial Fibrillation, Hyperlipidema, 

Hypertension


   Denies: Congestive Heart Failure, Coronary Artery Disease, DVT, Myocardial 

Infarction, Pulmonary Embolism


Pulmonary Medical History: Reports: Asthma, Bronchitis


   Denies: Chronic Obstructive Pulmonary Disease (COPD), Pneumonia


EENT Medical History: 


   Denies: Cataracts, Ears - Hearing aids


Neurological Medical History: Reports: Migraine


   Denies: Hemorrhagic CVA, Ischemic CVA, Seizures


Endocrine Medical History: Reports: Diabetes Mellitus Type 2, Hypothyroidism, 

Obesity


   Denies: Diabetes Mellitus Type 1, Hyperthyroidism


Renal/ Medical History: 


   Denies: Chronic Kidney Disease, Nephrolithiasis


Malignancy Medical History: Reports: None


GI Medical History: Reports: Gastroesophageal Reflux Disease


   Denies: Cirrhosis, Crohn's Disease, Hepatitis, Ulcerative Colitis


Musculoskeltal Medical History: Reports: Arthritis


   Denies: Gout


Skin Medical History: 


   Denies: Eczema, Psoriasis


Psychiatric Medical History: Reports: Depression, General Anxiety Disorder, 

Schizoaffective Disorder


   Denies: Alcohol Dependency, Substance Abuse, Tobacco Dependency


Traumatic Medical History: Reports: None


Hematology: 


   Denies: Anemia, Bleeding Tendencies


Infectious Medical History: Reports: None





Past Surgical History


Past Surgical History: Reports: Cholecystectomy, Tubal Ligation





Social History


Information Source: Patient


Lives with: Spouse/Significant other


Smoking Status: Former Smoker


Electronic Cigarette use?: No


Frequency of Alcohol Use: None


Hx Recreational Drug Use: No


Drugs: None


Hx Prescription Drug Abuse: No





- Advance Directive


Resuscitation Status: Full Code


Surrogate healthcare decision maker:: 


Manish Rockwell





Family History


Family History: CAD, CVA, DM, Hyperlipidemia, Hypertension, Malignancy, Thyroid 

Disfunction, Other - Asthma


Parental Family History Reviewed: Yes


Children Family History Reviewed: No


Sibling(s) Family History Reviewed.: Yes





Medication/Allergy


Home Medications: 








Albuterol Sulfate [Ventolin Hfa] 1 puff IH Q4HP PRN 12/20/17 


Alprazolam [Xanax] 1 mg PO TID PRN 12/20/17 


Amitriptyline HCl [Elavil 25 mg Tablet] 50 mg PO QHS 12/20/17 


Apixaban [Eliquis 5 mg Tablet] 5 mg PO Q12 12/20/17 


Levothyroxine Sodium [Tirosint] 125 mcg PO DAILY 12/20/17 


Metoprolol Succinate [Toprol Xl 25 mg Tab.sr] 25 mg PO BID 12/20/17 


Tizanidine HCl [Zanaflex] 6 mg PO QID 12/20/17 


Zolpidem Tartrate [Ambien] 10 mg PO QHS 12/20/17 


Atorvastatin Calcium [Lipitor 40 mg Tablet] 40 mg PO DAILY 11/16/18 


Brexpiprazole [Rexulti] 1 mg PO DAILY 11/16/18 


Desvenlafaxine [Desvenlafaxine ER] 50 mg PO DAILY 11/16/18 


Diphenhydramine HCl [Benadryl 50 mg Capsule] 50 mg PO QHS 11/16/18 


Glyburide [Diabeta 2.5 Mg Tablet] 2.5 mg PO DAILY 11/16/18 


Pregabalin [Lyrica 75 mg Capsule] 2 cap PO DAILY 11/16/18 


Solifenacin Succinate [Vesicare] 5 mg PO DAILY 11/16/18 


Dexlansoprazole [Dexilant 60 mg Capsule] 60 mg PO DAILY 11/19/18 


Albuterol Sulfate [Proventil 0.5% Neb 2.5 mg/0.5 ml Vial.neb] 2.5 mg NEB Q4HP 

PRN #30 vial.neb 11/21/18 


Doxycycline Hyclate 100 mg PO BID #14 capsule 11/21/18 


Prednisone [Deltasone 20 mg Tablet] 2 tab PO DAILY 5 Days #10 tablet 11/21/18 


Azithromycin [Zithromax] 250 mg PO DAILY #4 tablet 12/10/18 


Ondansetron HCl [Zofran 4 mg Tablet] 1 - 2 tab PO Q6 #30 tablet 12/10/18 


Prochlorperazine Maleate [Compazine] 5 mg PO Q6 #30 tablet 12/10/18 


Ciprofloxacin HCl [Ciloxan 0.3% Oph Soln 2.5 ml] 1 drop OP Q4H 7 Days #1 bottle 

04/08/19 


Nitrofurantoin Macrocrystal [Macrodantin] 100 mg PO BID #14 capsule 06/30/19 


Prochlorperazine Maleate [Compazine 10 mg Tablet] 10 mg PO Q6HP PRN #10 tablet 

07/22/19 


Butalb/Acetaminophen/Caffeine [Fioricet (-40 mg) Tablet] 1 tab PO Q4HP PRN

#30 tab 10/21/19 


Doxycycline Hyclate 100 mg PO BID #10 capsule 10/26/19 


Cephalexin Monohydrate [Keflex 500 mg Capsule] 500 mg PO Q6H 10 Days #40 capsule

12/15/19 


Phenazopyridine HCl [Pyridium 200 mg Tablet] 200 mg PO TID #15 tablet 12/15/19 








Allergies/Adverse Reactions: 


                                        





tramadol Allergy (Intermediate, Verified 12/15/19 17:06)


   AMS


Penicillins Allergy (Mild, Verified 12/15/19 17:06)


   rash


Sulfa (Sulfonamide Antibiotics) Allergy (Mild, Verified 12/15/19 17:06)


   rash


aspirin Allergy (Verified 12/15/19 17:06)


   NO ASA











Review of Systems


Constitutional: ABSENT: chills, fever(s)


Eyes: ABSENT: visual disturbances, other - Eye pain


Ears: ABSENT: hearing changes, other - Ear pain


Nose, Mouth, and Throat: ABSENT: headache(s), sore throat


Cardiovascular: PRESENT: as per HPI, chest pain - Severe pleuritic chest pain 

bilaterally with cough, dyspnea on exertion, edema - Face, hands and feet.  

ABSENT: orthropnea, palpitations


Respiratory: PRESENT: as per HPI, cough - Severe paroxysmal cough, dyspnea.  

ABSENT: sputum


Gastrointestinal: ABSENT: abdominal pain, constipation, diarrhea, nausea, 

vomiting


Genitourinary: ABSENT: dysuria, hematuria


Musculoskeletal: ABSENT: back pain, joint swelling


Integumentary: ABSENT: pruritus, rash


Neurological: ABSENT: confusion, convulsions, focal weakness, memory loss, 

syncope


Endocrine: ABSENT: cold intolerance, heat intolerance, polydipsia, polyphagia, 

polyuria


Hematologic/Lymphatic: ABSENT: easy bleeding, easy bruising


Allergic/Immunologic: ABSENT: seasonal rhinorrhea





Physical Exam


Vital Signs: 


                                        











Temp Pulse Resp BP Pulse Ox


 


 99.2 F   147 H  38 H  133/101 H  93 


 


 03/22/20 02:03  03/21/20 21:09  03/22/20 02:46  03/22/20 02:46  03/22/20 02:46








                                 Intake & Output











 03/20/20 03/21/20 03/22/20





 23:59 23:59 23:59


 


Intake Total   182


 


Balance   182


 


Weight  113.398 kg 











General appearance: PRESENT: cooperative, mild distress - Secondary to cough, 

obese


Head exam: PRESENT: atraumatic, normocephalic


Eye exam: PRESENT: conjunctiva pink.  ABSENT: conjunctival injection, scleral 

icterus


Ear exam: PRESENT: normal external ear exam.  ABSENT: bleeding, drainage


Mouth exam: PRESENT: dry mucosa, neck supple


Neck exam: ABSENT: thyromegaly, tracheal deviation


Respiratory exam: PRESENT: clear to auscultation pallavi, symmetrical, unlabored


Cardiovascular exam: PRESENT: irregular rhythm - Irregularly irregular rate and 

rhythm, tachycardia.  ABSENT: clicks, gallop, rubs


Pulses: PRESENT: normal radial pulses, normal dorsalis pedis pul


Vascular exam: PRESENT: normal capillary refill.  ABSENT: pallor


GI/Abdominal exam: PRESENT: normal bowel sounds, soft.  ABSENT: tenderness


Rectal exam: PRESENT: deferred


Extremities exam: PRESENT: pedal edema - Trace to 1+ bilaterally.  ABSENT: joint

swelling


Musculoskeletal exam: ABSENT: deformity, dislocation


Neurological exam: PRESENT: alert, oriented to person, oriented to place, 

oriented to time, oriented to situation, CN II-XII grossly intact.  ABSENT: 

motor sensory deficit


Psychiatric exam: PRESENT: anxious, normal mood


Skin exam: PRESENT: dry, intact, warm.  ABSENT: jaundice, rash, urticaria





Results


Laboratory Results: 


                                        





                                 03/21/20 21:18 





                                 03/21/20 22:48 





                                        











  03/21/20 03/21/20 03/21/20





  21:18 21:18 21:42


 


WBC  13.9 H  


 


RBC  4.47  


 


Hgb  14.4  


 


Hct  43.4  


 


MCV  97  


 


MCH  32.3  


 


MCHC  33.2  


 


RDW  13.9  


 


Plt Count  316  


 


Seg Neutrophils %  76.9  


 


VBG pH   


 


VBG pCO2   


 


VBG HCO3   


 


VBG Base Excess   


 


Sodium   Cancelled 


 


Potassium   Cancelled 


 


Chloride   Cancelled 


 


Carbon Dioxide   Cancelled 


 


Anion Gap   Cancelled 


 


BUN   Cancelled 


 


Creatinine   Cancelled 


 


Est GFR ( Amer)   Cancelled 


 


Est GFR (Non-Af Amer)   Cancelled 


 


Glucose   Cancelled 


 


Lactic Acid    2.5 H


 


Calcium   Cancelled 


 


Magnesium   Cancelled 


 


Total Bilirubin   Cancelled 


 


AST   Cancelled 


 


Alkaline Phosphatase   Cancelled 


 


Total Protein   Cancelled 


 


Albumin   Cancelled 


 


TSH   


 


Urine Color   


 


Urine Appearance   


 


Urine pH   


 


Ur Specific Gravity   


 


Urine Protein   


 


Urine Glucose (UA)   


 


Urine Ketones   


 


Urine Blood   


 


Urine RBC (Auto)   














  03/21/20 03/21/20 03/21/20





  21:42 22:48 22:48


 


WBC   


 


RBC   


 


Hgb   


 


Hct   


 


MCV   


 


MCH   


 


MCHC   


 


RDW   


 


Plt Count   


 


Seg Neutrophils %   


 


VBG pH  Cancelled  


 


VBG pCO2  Cancelled  


 


VBG HCO3  Cancelled  


 


VBG Base Excess  Cancelled  


 


Sodium   135.0 L 


 


Potassium   4.8 


 


Chloride   97 L 


 


Carbon Dioxide   27 


 


Anion Gap   11 


 


BUN   11 


 


Creatinine   0.90 


 


Est GFR ( Amer)   > 60 


 


Est GFR (Non-Af Amer)   


 


Glucose   247 H 


 


Lactic Acid   


 


Calcium   9.1 


 


Magnesium   1.4 L 


 


Total Bilirubin   0.5 


 


AST   29 


 


Alkaline Phosphatase   146 H 


 


Total Protein   7.0 


 


Albumin   4.1 


 


TSH    7.75 H


 


Urine Color   


 


Urine Appearance   


 


Urine pH   


 


Ur Specific Gravity   


 


Urine Protein   


 


Urine Glucose (UA)   


 


Urine Ketones   


 


Urine Blood   


 


Urine RBC (Auto)   














  03/21/20 03/22/20





  23:25 01:21


 


WBC  


 


RBC  


 


Hgb  


 


Hct  


 


MCV  


 


MCH  


 


MCHC  


 


RDW  


 


Plt Count  


 


Seg Neutrophils %  


 


VBG pH  7.31 


 


VBG pCO2  50.0 


 


VBG HCO3  24.5 


 


VBG Base Excess  -2.3 


 


Sodium  


 


Potassium  


 


Chloride  


 


Carbon Dioxide  


 


Anion Gap  


 


BUN  


 


Creatinine  


 


Est GFR ( Amer)  


 


Est GFR (Non-Af Amer)  


 


Glucose  


 


Lactic Acid  


 


Calcium  


 


Magnesium  


 


Total Bilirubin  


 


AST  


 


Alkaline Phosphatase  


 


Total Protein  


 


Albumin  


 


TSH  


 


Urine Color   YELLOW


 


Urine Appearance   SLIGHTLY-CLOUDY


 


Urine pH   5.0


 


Ur Specific Gravity   > 1.060


 


Urine Protein   100 H


 


Urine Glucose (UA)   150 H


 


Urine Ketones   NEGATIVE


 


Urine Blood   SMALL H


 


Urine RBC (Auto)   34








                                        











  03/21/20 03/21/20 03/21/20





  21:18 21:18 22:48


 


Creatine Kinase  Cancelled   120


 


CK-MB (CK-2)   1.61 


 


Troponin I   < 0.012 


 


NT-Pro-B Natriuret Pep   














  03/21/20 03/22/20





  22:48 00:35


 


Creatine Kinase  


 


CK-MB (CK-2)  


 


Troponin I   < 0.012


 


NT-Pro-B Natriuret Pep  831 H 











Impressions: 


                                        





Chest X-Ray  03/21/20 21:15


IMPRESSION:


Mild patchy basilar opacities are similar in appearance to prior


study. Clinical correlation is advised. The possibility of


infection is not excluded.


 








Chest/Abdomen CTA  03/21/20 22:09


IMPRESSION: 


 


No aortic dissection or aneurysm. No pulmonary embolus. No


definite pneumonia.


 














Assessment and Plan





- Diagnosis


(1) Paroxysmal cough


Is this a current diagnosis for this admission?: Yes   





(2) Acute respiratory failure with hypoxia


Is this a current diagnosis for this admission?: Yes   





(3) Pleuritic chest pain


Is this a current diagnosis for this admission?: Yes   





(4) Hypothyroidism


Qualifiers: 


   Hypothyroidism type: unspecified   Qualified Code(s): E03.9 - Hypothyroidism,

unspecified   


Is this a current diagnosis for this admission?: Yes   





(5) Paroxysmal A-fib


Is this a current diagnosis for this admission?: Yes   





(6) Atrial fibrillation with RVR


Is this a current diagnosis for this admission?: Yes   





(7) Leukocytosis, unspecified


Qualifiers: 


   Leukocytosis type: unspecified   Qualified Code(s): D72.829 - Elevated white 

blood cell count, unspecified   


Is this a current diagnosis for this admission?: Yes   





(8) Diabetes mellitus type 2 in obese


Is this a current diagnosis for this admission?: Yes   





(9) Hypertension


Qualifiers: 


   Hypertension type: essential hypertension   Qualified Code(s): I10 - [facility]

l (primary) hypertension   


Is this a current diagnosis for this admission?: Yes   





(10) Schizoaffective disorder


Qualifiers: 


   Schizoaffective disorder type: unspecified   Qualified Code(s): F25.9 - 

Schizoaffective disorder, unspecified   


Is this a current diagnosis for this admission?: Yes   





(11) Obesity


Is this a current diagnosis for this admission?: Yes   





- Plan Summary


Summary: 


Patient is admitted to the medical floor in a telemetry bed where she will 

receive routine supportive and symptomatic cares.  She will be treated with IV 

fluids and supplemental oxygen via nasal cannula and/or BiPAP if required to 

maintain adequate oxygen saturation.  She will also receive adequate analgesia 

for her chest pain using both nonnarcotic and narcotic treatments.  A cardiology

consultation will be obtained if her atrial fibrillation cannot be controlled 

with conversion to oral medications.  Patient was placed on Rocephin and 

Zithromax prophylactically/empirically in the emergency room though no obvious 

source of infection was noted.  Serial lactic acids will be obtained.  Serial 

troponin I levels will also be obtained.  A hemoglobin A1c, lipid profile and 

thyroid profile will be obtained.  CBCs, metabolic profiles and magnesium levels

will be obtained as appropriate.  Patient will use morphine sulfate 2 to 4 mg IV

every 2 hours as needed for severe pain.  An effort will be made to withdraw the

patient from the diltiazem infusion as soon as possible.  Before meals and at 

bedtime Accu-Cheks to be obtained with sliding scale insulin available for 

hyperglycemia and a hypoglycemic protocol in place.  Patient be continued on her

usual medications as appropriate once her medication list has been verified and 

reconciled.  Nutritionist consultation will be obtained for dietary evaluation 

and treatment of her obesity and diabetes.





- Time


Time Spent with patient: 15-24 minutes


Medications reviewed and adjusted accordingly: Yes


Anticipated discharge: Home





- Inpatient Certification


Based on my medical assessment, after consideration of the patient's 

comorbidities, presenting symptoms, or acuity I expect that the services needed 

warrant INPATIENT care.: Yes


I certify that my determination is in accordance with my understanding of 

Medicare's requirements for reasonable and necessary INPATIENT services [42 CFR 

412.3e].: Yes


Medical Necessity: Significant Comorbidiites Make Outpatient Treatment Too 

Risky, Need Close Monitoring Due to Risk of Patient Decompensation, Need For IV 

Fluids, Need For Continuous Telemetry Monitoring, Need for Pain Control, Risk of

Complication if Not Cared For in Hospital

## 2020-03-22 NOTE — RADIOLOGY REPORT (SQ)
CLINICAL HISTORY:   a fib with rvr, dyspnea, chest pain 



COMPARISON: 11/21/2018.



TECHNIQUE: CT CHEST ANGIOGRAPHY WITHOUT THEN WITH IV CONTRAST on

3/21/2020 10:09 PM CDT. MIPS reconstructions were generated.



This exam was performed according to our departmental

dose-optimization program, which includes automated exposure

control, adjustment of the mA and/or kV according to patient size

and/or use of iterative reconstruction technique.  MIP images

were generated.



FINDINGS: 



Thoracic aorta is normal in course and caliber without aneurysm

or dissection. Pulmonary arteries are adequately opacified

without acute or chronic filling defects.



The heart is enlarged. There is no pericardial effusion.

Intrathoracic lymph nodes are not enlarged.



There is no pleural effusion, pleural thickening or pneumothorax.

Central airways are patent. There is mild atelectasis and

scarring in the right middle lobe, lingula and anterior left

lower lobe.



There are no acute abnormalities within the limited images of the

upper abdomen.  There are no acute osseous findings. No

suspicious bony lesions.



IMPRESSION: 



No aortic dissection or aneurysm. No pulmonary embolus. No

definite pneumonia.

## 2020-03-22 NOTE — EKG REPORT
SEVERITY:- ABNORMAL ECG -

ATRIAL FILUTTER

NONSPECIFIC T ABNORMALITIES, DIFFUSE LEADS

BORDERLINE PROLONGED QT INTERVAL

:

Confirmed by: Clarissa Rowell MD 22-Mar-2020 12:33:11

## 2020-03-22 NOTE — PDOC PROGRESS REPORT
Subjective


Progress Note for:: 03/22/20


Subjective:: 


The patient is short of breath.  She feels gurgling breath sounds.  She looks 

quite uncomfortable.


Reason For Visit: 


PAROXYSMAL AFIB WITH RVR,PAROXYSMAL COUGH,SEVERE








Physical Exam


Vital Signs: 


                                        











Temp Pulse Resp BP Pulse Ox


 


 98.7 F   95   20   147/65 H  93 


 


 03/22/20 07:44  03/22/20 10:00  03/22/20 07:44  03/22/20 10:00  03/22/20 07:44








                                 Intake & Output











 03/21/20 03/22/20 03/23/20





 06:59 06:59 06:59


 


Intake Total  1708 1609


 


Balance  1708 1609


 


Weight  112.3 kg 











General appearance: PRESENT: cooperative, morbidly obese, well-developed


Exam: 


Morbidly obese 54-year-old female sitting in the chair.  Clearly with 

respiratory distress.  Breath sounds are gurgling.


Head exam: PRESENT: atraumatic, normocephalic


Ear exam: PRESENT: normal external ear exam.  ABSENT: bleeding, drainage


Respiratory exam: PRESENT: prolonged expiratory phas, rhonchi, symmetrical, 

tachypnea.  ABSENT: accessory muscle use, wheezes


Cardiovascular exam: PRESENT: irregular rhythm, +S1, +S2


GI/Abdominal exam: PRESENT: normal bowel sounds, soft, other - Protuberant 

abdomen.  ABSENT: guarding, tenderness


Rectal exam: PRESENT: deferred


Gentrourinary exam: ABSENT: indwelling catheter


Extremities exam: PRESENT: pedal edema


Musculoskeletal exam: PRESENT: ambulatory.  ABSENT: deformity


Neurological exam: PRESENT: alert, awake, oriented to person, oriented to place,

oriented to time, oriented to situation, CN II-XII grossly intact


Psychiatric exam: PRESENT: flat affect.  ABSENT: agitated, anxious


Focused psych exam: ABSENT: delusional, paranoid, restlessness


Skin exam: PRESENT: dry, normal color, warm.  ABSENT: rash





Results


Laboratory Results: 


                                        





                                 03/21/20 21:18 





                                 03/21/20 22:48 





                                        











  03/21/20 03/21/20 03/21/20





  21:18 21:18 21:42


 


WBC  13.9 H  


 


RBC  4.47  


 


Hgb  14.4  


 


Hct  43.4  


 


MCV  97  


 


MCH  32.3  


 


MCHC  33.2  


 


RDW  13.9  


 


Plt Count  316  


 


Seg Neutrophils %  76.9  


 


VBG pH   


 


VBG pCO2   


 


VBG HCO3   


 


VBG Base Excess   


 


Sodium   Cancelled 


 


Potassium   Cancelled 


 


Chloride   Cancelled 


 


Carbon Dioxide   Cancelled 


 


Anion Gap   Cancelled 


 


BUN   Cancelled 


 


Creatinine   Cancelled 


 


Est GFR ( Amer)   Cancelled 


 


Est GFR (Non-Af Amer)   Cancelled 


 


Glucose   Cancelled 


 


Lactic Acid    2.5 H


 


Calcium   Cancelled 


 


Magnesium   Cancelled 


 


Total Bilirubin   Cancelled 


 


AST   Cancelled 


 


Alkaline Phosphatase   Cancelled 


 


Total Protein   Cancelled 


 


Albumin   Cancelled 


 


TSH   


 


Urine Color   


 


Urine Appearance   


 


Urine pH   


 


Ur Specific Gravity   


 


Urine Protein   


 


Urine Glucose (UA)   


 


Urine Ketones   


 


Urine Blood   


 


Urine RBC (Auto)   














  03/21/20 03/21/20 03/21/20





  21:42 22:48 22:48


 


WBC   


 


RBC   


 


Hgb   


 


Hct   


 


MCV   


 


MCH   


 


MCHC   


 


RDW   


 


Plt Count   


 


Seg Neutrophils %   


 


VBG pH  Cancelled  


 


VBG pCO2  Cancelled  


 


VBG HCO3  Cancelled  


 


VBG Base Excess  Cancelled  


 


Sodium   135.0 L 


 


Potassium   4.8 


 


Chloride   97 L 


 


Carbon Dioxide   27 


 


Anion Gap   11 


 


BUN   11 


 


Creatinine   0.90 


 


Est GFR ( Amer)   > 60 


 


Est GFR (Non-Af Amer)   


 


Glucose   247 H 


 


Lactic Acid   


 


Calcium   9.1 


 


Magnesium   1.4 L 


 


Total Bilirubin   0.5 


 


AST   29 


 


Alkaline Phosphatase   146 H 


 


Total Protein   7.0 


 


Albumin   4.1 


 


TSH    7.75 H


 


Urine Color   


 


Urine Appearance   


 


Urine pH   


 


Ur Specific Gravity   


 


Urine Protein   


 


Urine Glucose (UA)   


 


Urine Ketones   


 


Urine Blood   


 


Urine RBC (Auto)   














  03/21/20 03/22/20 03/22/20





  23:25 01:21 04:31


 


WBC   


 


RBC   


 


Hgb   


 


Hct   


 


MCV   


 


MCH   


 


MCHC   


 


RDW   


 


Plt Count   


 


Seg Neutrophils %   


 


VBG pH  7.31  


 


VBG pCO2  50.0  


 


VBG HCO3  24.5  


 


VBG Base Excess  -2.3  


 


Sodium   


 


Potassium   


 


Chloride   


 


Carbon Dioxide   


 


Anion Gap   


 


BUN   


 


Creatinine   


 


Est GFR ( Amer)   


 


Est GFR (Non-Af Amer)   


 


Glucose   


 


Lactic Acid    1.8


 


Calcium   


 


Magnesium   


 


Total Bilirubin   


 


AST   


 


Alkaline Phosphatase   


 


Total Protein   


 


Albumin   


 


TSH   


 


Urine Color   YELLOW 


 


Urine Appearance   SLIGHTLY-CLOUDY 


 


Urine pH   5.0 


 


Ur Specific Gravity   > 1.060 


 


Urine Protein   100 H 


 


Urine Glucose (UA)   150 H 


 


Urine Ketones   NEGATIVE 


 


Urine Blood   SMALL H 


 


Urine RBC (Auto)   34 














  03/22/20





  08:23


 


WBC 


 


RBC 


 


Hgb 


 


Hct 


 


MCV 


 


MCH 


 


MCHC 


 


RDW 


 


Plt Count 


 


Seg Neutrophils % 


 


VBG pH 


 


VBG pCO2 


 


VBG HCO3 


 


VBG Base Excess 


 


Sodium 


 


Potassium 


 


Chloride 


 


Carbon Dioxide 


 


Anion Gap 


 


BUN 


 


Creatinine 


 


Est GFR ( Amer) 


 


Est GFR (Non-Af Amer) 


 


Glucose 


 


Lactic Acid  1.9


 


Calcium 


 


Magnesium 


 


Total Bilirubin 


 


AST 


 


Alkaline Phosphatase 


 


Total Protein 


 


Albumin 


 


TSH 


 


Urine Color 


 


Urine Appearance 


 


Urine pH 


 


Ur Specific Gravity 


 


Urine Protein 


 


Urine Glucose (UA) 


 


Urine Ketones 


 


Urine Blood 


 


Urine RBC (Auto) 








                                        











  03/21/20 03/21/20 03/21/20





  21:18 21:18 22:48


 


Creatine Kinase  Cancelled   120


 


CK-MB (CK-2)   1.61 


 


Troponin I   < 0.012 


 


NT-Pro-B Natriuret Pep   














  03/21/20 03/22/20 03/22/20





  22:48 00:35 04:31


 


Creatine Kinase   


 


CK-MB (CK-2)   


 


Troponin I   < 0.012  < 0.012


 


NT-Pro-B Natriuret Pep  831 H  














  03/22/20





  08:23


 


Creatine Kinase 


 


CK-MB (CK-2) 


 


Troponin I  < 0.012


 


NT-Pro-B Natriuret Pep 











Impressions: 


                                        





Chest X-Ray  03/21/20 21:15


IMPRESSION:


Mild patchy basilar opacities are similar in appearance to prior


study. Clinical correlation is advised. The possibility of


infection is not excluded.


 








Chest/Abdomen CTA  03/21/20 22:09


IMPRESSION: 


 


No aortic dissection or aneurysm. No pulmonary embolus. No


definite pneumonia.


 














Assessment and Plan





- Diagnosis


(1) Acute respiratory failure with hypoxia


Is this a current diagnosis for this admission?: Yes   


Plan: 


3/22/2020


Currently requiring 6 L nasal cannula.  Normally she does not wear oxygen at 

home.  Likely combination of chronic diastolic heart failure exacerbated by the 

atrial fibrillation.








(2) Paroxysmal atrial fibrillation


Is this a current diagnosis for this admission?: Yes   


Plan: 


3/22/2020


Patient has a history of paroxysmal atrial fibrillation.  She has been on 

metoprolol with reasonable control.  It is possible that pneumonia has caused a 

burst of rapid ventricular rate.  She was on diltiazem infusion.  Digoxin seems 

to be most effective.  I will add this to her metoprolol regimen.  Continue 

monitoring on telemetry.








(3) Atrial fibrillation with RVR


Is this a current diagnosis for this admission?: Yes   


Plan: 


3/22/2020


See above








(4) Acute on chronic diastolic heart failure


Is this a current diagnosis for this admission?: Yes   


Plan: 


3/22/2020


The patient likely has acute on chronic heart failure due to a 3.3 L net 

positive fluid balance, the rapid atrial fibrillation and her furosemide is 

listed as dosing as needed.  I will give her 40 mg of IV Lasix today and start 

40 mg p.o. daily tomorrow.  We will need to monitor electrolytes as well as 

intake and output.  I expect when her heart failure improves her oxygen 

requirements will decrease.








(5) Pneumonia


Qualifiers: 


   Pneumonia type: due to unspecified organism   Laterality: bilateral   Lung 

location: lower lobe of lung   Qualified Code(s): J18.9 - Pneumonia, unspecified

organism   


Is this a current diagnosis for this admission?: Yes   


Plan: 


3/22/2020


The patient has bilateral lower lobe opacities.  It is similar to previous exam.

 It is difficult to know if this is part of her heart failure, atelectatic 

changes or infection.  She did have an elevated white blood cell count.  She 

will be treated with empiric antibiotic therapy in addition to treating her 

comorbidities of heart failure and atrial fibrillation as well as a history of 

asthma








(6) Paroxysmal cough


Is this a current diagnosis for this admission?: Yes   


Plan: 


3/22/2020


Still with cough








(7) Diabetes mellitus type 2 in obese


Is this a current diagnosis for this admission?: Yes   


Plan: 


3/22/2020


We will continue home diabetic regimen.  Accu-Cheks with sliding scale coverage 

as well.  Hemoglobin A1c was 11.  She does not exhibit good control at this ti

me.  We will try and keep Accu-Cheks less than 150.








(8) Leukocytosis, unspecified


Qualifiers: 


   Leukocytosis type: unspecified   Qualified Code(s): D72.829 - Elevated white 

blood cell count, unspecified   


Is this a current diagnosis for this admission?: Yes   


Plan: 


3/22/2020


Likely secondary to infection and stress of current exacerbation of heart 

failure and atrial fibrillation.  Continue to monitor white blood cell count.








(9) Hypothyroidism


Qualifiers: 


   Hypothyroidism type: unspecified   Qualified Code(s): E03.9 - Hypothyroidism,

unspecified   


Is this a current diagnosis for this admission?: Yes   


Plan: 


3/22/2020


Currently not on thyroid replacement therapy.  Once atrial fibrillation is under

better control consider adding levothyroxine.  This certainly can be followed up

as an outpatient.








(10) Pleuritic chest pain


Is this a current diagnosis for this admission?: Yes   


Plan: 


3/22/2020


Conservative symptom management








(11) Hypertension


Qualifiers: 


   Hypertension type: essential hypertension   Qualified Code(s): I10 - 

Essential (primary) hypertension   


Is this a current diagnosis for this admission?: Yes   


Plan: 


3/22/2020


Will be on metoprolol and furosemide.  Monitor on telemetry and monitor with 

regular vital signs.








(12) Schizoaffective disorder


Qualifiers: 


   Schizoaffective disorder type: unspecified   Qualified Code(s): F25.9 - 

Schizoaffective disorder, unspecified   


Is this a current diagnosis for this admission?: Yes   


Plan: 


3/22/2020


Continue home medication regimen








(13) Morbid obesity with BMI of 40.0-44.9, adult


Is this a current diagnosis for this admission?: Yes   


Plan: 


3/22/2020


BMI is 43.9.  It is my believe that a good cardiac diabetic diet as well as 

correction of her hypothyroidism will help.  She will still need aggressive 

weight management by diet and exercise.








- Plan Summary


Summary: 


Patient is admitted to the medical floor in a telemetry bed where she will 

receive routine supportive and symptomatic cares.  She will be treated with IV 

fluids and supplemental oxygen via nasal cannula and/or BiPAP if required to 

maintain adequate oxygen saturation.  She will also receive adequate analgesia f

or her chest pain using both nonnarcotic and narcotic treatments.  A cardiology 

consultation will be obtained if her atrial fibrillation cannot be controlled 

with conversion to oral medications.  Patient was placed on Rocephin and 

Zithromax prophylactically/empirically in the emergency room though no obvious 

source of infection was noted.  Serial lactic acids will be obtained.  Serial 

troponin I levels will also be obtained.  A hemoglobin A1c, lipid profile and 

thyroid profile will be obtained.  CBCs, metabolic profiles and magnesium levels

will be obtained as appropriate.  Patient will use morphine sulfate 2 to 4 mg IV

every 2 hours as needed for severe pain.  An effort will be made to withdraw the

patient from the diltiazem infusion as soon as possible.  Before meals and at 

bedtime Accu-Cheks to be obtained with sliding scale insulin available for 

hyperglycemia and a hypoglycemic protocol in place.  Patient be continued on her

usual medications as appropriate once her medication list has been verified and 

reconciled.  Nutritionist consultation will be obtained for dietary evaluation 

and treatment of her obesity and diabetes.

## 2020-03-23 LAB
ADD MANUAL DIFF: NO
ANION GAP SERPL CALC-SCNC: 5 MMOL/L (ref 5–19)
BASOPHILS # BLD AUTO: 0 10^3/UL (ref 0–0.2)
BASOPHILS NFR BLD AUTO: 0.9 % (ref 0–2)
BUN SERPL-MCNC: 8 MG/DL (ref 7–20)
CALCIUM: 8.4 MG/DL (ref 8.4–10.2)
CHLORIDE SERPL-SCNC: 100 MMOL/L (ref 98–107)
CO2 SERPL-SCNC: 30 MMOL/L (ref 22–30)
DIGOXIN SERPL-MCNC: 0.97 NG/ML (ref 0.8–2)
EOSINOPHIL # BLD AUTO: 0.2 10^3/UL (ref 0–0.6)
EOSINOPHIL NFR BLD AUTO: 3 % (ref 0–6)
ERYTHROCYTE [DISTWIDTH] IN BLOOD BY AUTOMATED COUNT: 13.9 % (ref 11.5–14)
GLUCOSE SERPL-MCNC: 174 MG/DL (ref 75–110)
HCT VFR BLD CALC: 38.8 % (ref 36–47)
HGB BLD-MCNC: 12.9 G/DL (ref 12–15.5)
LYMPHOCYTES # BLD AUTO: 1.5 10^3/UL (ref 0.5–4.7)
LYMPHOCYTES NFR BLD AUTO: 29.4 % (ref 13–45)
MCH RBC QN AUTO: 32.1 PG (ref 27–33.4)
MCHC RBC AUTO-ENTMCNC: 33.3 G/DL (ref 32–36)
MCV RBC AUTO: 96 FL (ref 80–97)
MONOCYTES # BLD AUTO: 0.5 10^3/UL (ref 0.1–1.4)
MONOCYTES NFR BLD AUTO: 10.3 % (ref 3–13)
NEUTROPHILS # BLD AUTO: 2.9 10^3/UL (ref 1.7–8.2)
NEUTS SEG NFR BLD AUTO: 56.4 % (ref 42–78)
PLATELET # BLD: 252 10^3/UL (ref 150–450)
POTASSIUM SERPL-SCNC: 4.2 MMOL/L (ref 3.6–5)
RBC # BLD AUTO: 4.03 10^6/UL (ref 3.72–5.28)
T3FREE SERPL-MCNC: 2.68 PG/ML (ref 2.77–5.27)
TOTAL CELLS COUNTED % (AUTO): 100 %
TSH SERPL-ACNC: 12.2 UIU/ML (ref 0.47–4.68)
WBC # BLD AUTO: 5.1 10^3/UL (ref 4–10.5)

## 2020-03-23 RX ADMIN — TOLTERODINE TARTRATE SCH MG: 1 TABLET, FILM COATED ORAL at 09:11

## 2020-03-23 RX ADMIN — Medication SCH ML: at 21:16

## 2020-03-23 RX ADMIN — VANCOMYCIN HYDROCHLORIDE SCH MLS/HR: 1 INJECTION, POWDER, LYOPHILIZED, FOR SOLUTION INTRAVENOUS at 15:48

## 2020-03-23 RX ADMIN — INSULIN HUMAN SCH UNIT: 100 INJECTION, SOLUTION PARENTERAL at 17:06

## 2020-03-23 RX ADMIN — DIGOXIN SCH MG: 0.25 TABLET ORAL at 09:12

## 2020-03-23 RX ADMIN — IPRATROPIUM BROMIDE SCH MG: 0.5 SOLUTION RESPIRATORY (INHALATION) at 14:00

## 2020-03-23 RX ADMIN — METOPROLOL SUCCINATE SCH MG: 25 TABLET, EXTENDED RELEASE ORAL at 09:13

## 2020-03-23 RX ADMIN — PANTOPRAZOLE SODIUM SCH MG: 40 TABLET, DELAYED RELEASE ORAL at 05:33

## 2020-03-23 RX ADMIN — ZOLPIDEM TARTRATE SCH MG: 5 TABLET ORAL at 21:19

## 2020-03-23 RX ADMIN — SITAGLIPTIN SCH MG: 50 TABLET, FILM COATED ORAL at 09:11

## 2020-03-23 RX ADMIN — INSULIN HUMAN SCH UNIT: 100 INJECTION, SOLUTION PARENTERAL at 11:27

## 2020-03-23 RX ADMIN — Medication SCH: at 05:33

## 2020-03-23 RX ADMIN — GLIPIZIDE SCH MG: 5 TABLET, FILM COATED, EXTENDED RELEASE ORAL at 09:11

## 2020-03-23 RX ADMIN — FLUTICASONE FUROATE, UMECLIDINIUM BROMIDE AND VILANTEROL TRIFENATATE SCH INHALER: 100; 62.5; 25 POWDER RESPIRATORY (INHALATION) at 17:07

## 2020-03-23 RX ADMIN — DOCUSATE SODIUM SCH MG: 100 CAPSULE, LIQUID FILLED ORAL at 09:11

## 2020-03-23 RX ADMIN — METOPROLOL SUCCINATE SCH MG: 50 TABLET, EXTENDED RELEASE ORAL at 21:17

## 2020-03-23 RX ADMIN — BUDESONIDE SCH MG: 0.5 SUSPENSION RESPIRATORY (INHALATION) at 09:32

## 2020-03-23 RX ADMIN — LEVALBUTEROL HYDROCHLORIDE SCH MG: 1.25 SOLUTION RESPIRATORY (INHALATION) at 02:11

## 2020-03-23 RX ADMIN — LEVALBUTEROL HYDROCHLORIDE SCH MG: 1.25 SOLUTION RESPIRATORY (INHALATION) at 14:00

## 2020-03-23 RX ADMIN — HYDROCODONE BITARTRATE AND ACETAMINOPHEN PRN TAB: 5; 325 TABLET ORAL at 09:13

## 2020-03-23 RX ADMIN — HYDROCODONE BITARTRATE AND ACETAMINOPHEN PRN TAB: 5; 325 TABLET ORAL at 15:48

## 2020-03-23 RX ADMIN — VANCOMYCIN HYDROCHLORIDE SCH MLS/HR: 1 INJECTION, POWDER, LYOPHILIZED, FOR SOLUTION INTRAVENOUS at 21:17

## 2020-03-23 RX ADMIN — INSULIN HUMAN SCH: 100 INJECTION, SOLUTION PARENTERAL at 08:18

## 2020-03-23 RX ADMIN — VENLAFAXINE HYDROCHLORIDE SCH MG: 75 CAPSULE, EXTENDED RELEASE ORAL at 09:11

## 2020-03-23 RX ADMIN — FUROSEMIDE SCH MG: 40 TABLET ORAL at 09:12

## 2020-03-23 RX ADMIN — MAGNESIUM OXIDE TAB 400 MG (241.3 MG ELEMENTAL MG) SCH MG: 400 (241.3 MG) TAB at 17:07

## 2020-03-23 RX ADMIN — LEVALBUTEROL HYDROCHLORIDE SCH MG: 1.25 SOLUTION RESPIRATORY (INHALATION) at 09:32

## 2020-03-23 RX ADMIN — Medication SCH: at 13:32

## 2020-03-23 RX ADMIN — DOCUSATE SODIUM SCH MG: 100 CAPSULE, LIQUID FILLED ORAL at 17:07

## 2020-03-23 RX ADMIN — AMITRIPTYLINE HYDROCHLORIDE SCH MG: 50 TABLET, FILM COATED ORAL at 21:16

## 2020-03-23 RX ADMIN — PANTOPRAZOLE SODIUM SCH MG: 40 TABLET, DELAYED RELEASE ORAL at 17:07

## 2020-03-23 RX ADMIN — TOLTERODINE TARTRATE SCH MG: 1 TABLET, FILM COATED ORAL at 21:16

## 2020-03-23 RX ADMIN — INSULIN HUMAN SCH UNIT: 100 INJECTION, SOLUTION PARENTERAL at 22:01

## 2020-03-23 RX ADMIN — APIXABAN SCH MG: 5 TABLET, FILM COATED ORAL at 17:07

## 2020-03-23 RX ADMIN — IPRATROPIUM BROMIDE SCH MG: 0.5 SOLUTION RESPIRATORY (INHALATION) at 02:11

## 2020-03-23 RX ADMIN — MAGNESIUM OXIDE TAB 400 MG (241.3 MG ELEMENTAL MG) SCH MG: 400 (241.3 MG) TAB at 09:12

## 2020-03-23 RX ADMIN — IPRATROPIUM BROMIDE SCH MG: 0.5 SOLUTION RESPIRATORY (INHALATION) at 09:33

## 2020-03-23 RX ADMIN — HYDROCODONE BITARTRATE AND ACETAMINOPHEN PRN TAB: 5; 325 TABLET ORAL at 20:03

## 2020-03-23 RX ADMIN — APIXABAN SCH MG: 5 TABLET, FILM COATED ORAL at 09:11

## 2020-03-23 RX ADMIN — MEROPENEM SCH MLS/HR: 1 INJECTION INTRAVENOUS at 17:07

## 2020-03-23 RX ADMIN — MEROPENEM SCH MLS/HR: 1 INJECTION INTRAVENOUS at 09:12

## 2020-03-23 NOTE — PDOC PROGRESS REPORT
Subjective


Progress Note for:: 03/23/20


Reason For Visit: 


PAROXYSMAL AFIB WITH RVR,PAROXYSMAL COUGH,SEVERE








Physical Exam


Vital Signs: 


                                        











Temp Pulse Resp BP Pulse Ox


 


 98.0 F   97   18   132/56 H  92 


 


 03/23/20 11:17  03/23/20 14:01  03/23/20 14:01  03/23/20 11:17  03/23/20 14:01








                                 Intake & Output











 03/22/20 03/23/20 03/24/20





 06:59 06:59 06:59


 


Intake Total 1708 2429 100


 


Output Total  1000 


 


Balance 1708 1429 100


 


Weight 112.3 kg 112.3 kg 














Results


Laboratory Results: 


                                        





                                 03/23/20 05:40 





                                 03/23/20 06:45 





                                        











  03/23/20 03/23/20 03/23/20





  05:40 05:40 05:40


 


WBC   5.1 


 


RBC   4.03 


 


Hgb   12.9 


 


Hct   38.8 


 


MCV   96 


 


MCH   32.1 


 


MCHC   33.3 


 


RDW   13.9 


 


Plt Count   252 


 


Seg Neutrophils %   56.4 


 


Sodium    Cancelled


 


Potassium    Cancelled


 


Chloride    Cancelled


 


Carbon Dioxide    Cancelled


 


Anion Gap    Cancelled


 


BUN    Cancelled


 


Creatinine    Cancelled


 


Est GFR ( Amer)    Cancelled


 


Est GFR (Non-Af Amer)    Cancelled


 


Glucose    Cancelled


 


Calcium    Cancelled


 


Magnesium    Cancelled


 


TSH  Cancelled  


 


Free T3 pg/mL  Cancelled  














  03/23/20 03/23/20





  06:45 06:45


 


WBC  


 


RBC  


 


Hgb  


 


Hct  


 


MCV  


 


MCH  


 


MCHC  


 


RDW  


 


Plt Count  


 


Seg Neutrophils %  


 


Sodium  135.2 L 


 


Potassium  4.2 


 


Chloride  100 


 


Carbon Dioxide  30 


 


Anion Gap  5 


 


BUN  8 


 


Creatinine  0.78 


 


Est GFR ( Amer)  > 60 


 


Est GFR (Non-Af Amer)  


 


Glucose  174 H 


 


Calcium  8.4 


 


Magnesium  1.9 


 


TSH   12.20 H


 


Free T3 pg/mL   2.68 L








                                        











  03/21/20 03/21/20 03/21/20





  21:18 21:18 22:48


 


Creatine Kinase  Cancelled   120


 


CK-MB (CK-2)   1.61 


 


Troponin I   < 0.012 


 


NT-Pro-B Natriuret Pep   














  03/21/20 03/22/20 03/22/20





  22:48 00:35 04:31


 


Creatine Kinase   


 


CK-MB (CK-2)   


 


Troponin I   < 0.012  < 0.012


 


NT-Pro-B Natriuret Pep  831 H  














  03/22/20 03/22/20 03/22/20





  08:23 12:30 16:00


 


Creatine Kinase   


 


CK-MB (CK-2)   


 


Troponin I  < 0.012  < 0.012  < 0.012


 


NT-Pro-B Natriuret Pep   














  03/22/20





  20:20


 


Creatine Kinase 


 


CK-MB (CK-2) 


 


Troponin I  < 0.012


 


NT-Pro-B Natriuret Pep 











Impressions: 


                                        





Chest X-Ray  03/21/20 21:15


IMPRESSION:


Mild patchy basilar opacities are similar in appearance to prior


study. Clinical correlation is advised. The possibility of


infection is not excluded.


 








Chest/Abdomen CTA  03/21/20 22:09


IMPRESSION: 


 


No aortic dissection or aneurysm. No pulmonary embolus. No


definite pneumonia.


 














Assessment and Plan





- Diagnosis


(1) Acute respiratory failure with hypoxia


Is this a current diagnosis for this admission?: Yes   


Plan: 


3/22/2020


Currently requiring 6 L nasal cannula.  Normally she does not wear oxygen at 

home.  Likely combination of chronic diastolic heart failure exacerbated by the 

atrial fibrillation.


3/23/2020


Currently down to 4 L nasal cannula.  Have initiated diuresis.  She still is 

gurgly.  There is a significant drop in her net fluid balance today.  This shoul

d help.








(2) Paroxysmal atrial fibrillation


Is this a current diagnosis for this admission?: Yes   


Plan: 


3/22/2020


Patient has a history of paroxysmal atrial fibrillation.  She has been on 

metoprolol with reasonable control.  It is possible that pneumonia has caused a 

burst of rapid ventricular rate.  She was on diltiazem infusion.  Digoxin seems 

to be most effective.  I will add this to her metoprolol regimen.  Continue 

monitoring on telemetry.


3/23/2020


Still sees pulse rates greater than 100 with exertion.  I will increase the 

metoprolol to 50 mg twice daily.  Continue digoxin.








(3) Atrial fibrillation with RVR


Is this a current diagnosis for this admission?: Yes   


Plan: 


3/22/2020


See above








(4) Acute on chronic diastolic heart failure


Is this a current diagnosis for this admission?: Yes   


Plan: 


3/22/2020


The patient likely has acute on chronic heart failure due to a 3.3 L net 

positive fluid balance, the rapid atrial fibrillation and her furosemide is 

listed as dosing as needed.  I will give her 40 mg of IV Lasix today and start 

40 mg p.o. daily tomorrow.  We will need to monitor electrolytes as well as 

intake and output.  I expect when her heart failure improves her oxygen requir

ements will decrease.


3/23/2020


Continue current treatment plan as outlined above








(5) Pneumonia


Qualifiers: 


   Pneumonia type: due to unspecified organism   Laterality: bilateral   Lung 

location: lower lobe of lung   Qualified Code(s): J18.9 - Pneumonia, unspecified

organism   


Is this a current diagnosis for this admission?: Yes   


Plan: 


3/22/2020


The patient has bilateral lower lobe opacities.  It is similar to previous exam.

 It is difficult to know if this is part of her heart failure, atelectatic 

changes or infection.  She did have an elevated white blood cell count.  She 

will be treated with empiric antibiotic therapy in addition to treating her 

comorbidities of heart failure and atrial fibrillation as well as a history of 

asthma


3/23/2020


We will continue antibiotics.  Added vancomycin for reasons outlined above.  

Covid-19 testing results still unavailable.








(6) Paroxysmal cough


Is this a current diagnosis for this admission?: Yes   


Plan: 


3/22/2020


Still with cough


3/23/2020


Coughing is definitely less.  Continue regimen outlined above.








(7) Diabetes mellitus type 2 in obese


Is this a current diagnosis for this admission?: Yes   


Plan: 


3/22/2020


We will continue home diabetic regimen.  Accu-Cheks with sliding scale coverage 

as well.  Hemoglobin A1c was 11.  She does not exhibit good control at this 

time.  We will try and keep Accu-Cheks less than 150.


3/23/2020


Unfortunately Accu-Cheks are usually greater than 150.  If this continues 

through tomorrow then I will make adjustments in her regimen.








(8) Hypothyroidism


Qualifiers: 


   Hypothyroidism type: unspecified   Qualified Code(s): E03.9 - Hypothyroidism,

unspecified   


Is this a current diagnosis for this admission?: Yes   


Plan: 


3/22/2020


Currently not on thyroid replacement therapy.  Once atrial fibrillation is under

better control consider adding levothyroxine.  This certainly can be followed up

as an outpatient.


3/23/2020


Due to the patient's presentation (decreased energy and morbid obesity) I am 

going to initiate a trial of levothyroxine.  This may cause a problem with her 

heart rate.  We will monitor on telemetry.








(9) Pleuritic chest pain


Is this a current diagnosis for this admission?: Yes   


Plan: 


3/22/2020


Conservative symptom management


3/23/2020


Improved








(10) Hypertension


Qualifiers: 


   Hypertension type: essential hypertension   Qualified Code(s): I10 - 

Essential (primary) hypertension   


Is this a current diagnosis for this admission?: Yes   


Plan: 


3/22/2020


Will be on metoprolol and furosemide.  Monitor on telemetry and monitor with 

regular vital signs.


3/23/2020


I will increase the metoprolol today.  Blood pressures are improved.  We will 

continue to monitor.








(11) Schizoaffective disorder


Qualifiers: 


   Schizoaffective disorder type: unspecified   Qualified Code(s): F25.9 - 

Schizoaffective disorder, unspecified   


Is this a current diagnosis for this admission?: Yes   


Plan: 


3/22/2020


Continue home medication regimen


3/23/2020


The patient is on multiple medications.  If her somnolence continues I will 

consult with psychiatry and consider decreasing some of her medications.  I have

decreased her Ambien already.








(12) Morbid obesity with BMI of 40.0-44.9, adult


Is this a current diagnosis for this admission?: Yes   


Plan: 


3/22/2020


BMI is 43.9.  It is my believe that a good cardiac diabetic diet as well as 

correction of her hypothyroidism will help.  She will still need aggressive 

weight management by diet and exercise.








(13) Cystitis


Is this a current diagnosis for this admission?: Yes   


Plan: 


3/23/2020


Gram-negative bacilli isolated in the urine.  Sensitivity and identification 

pending.  Beta-hemolytic strep group B also isolated.  The patient is on's 

imipenem which will cover the gram-negative's.  There is also gram-positive 

cocci in clusters and one blood culture bottle.  If this confirms as a 

coagulase-negative staph then I will discontinue the vancomycin and use 

something milder for the group B strep.  The group B strep in the urine may not 

even be pathologic.








(14) Group B streptococcal infection


Is this a current diagnosis for this admission?: Yes   


Plan: 


3/23/2020


I have added vancomycin more for the gram-positive cocci in clusters in the 

blood culture.  If the gram-positive cocci in clusters is a coagulase-negative 

staph then it is likely a contaminant.  The patient is allergic to penicillins 

and sulfa antibiotics and so I will need to decide if the group B strep is 

pathologic or just present.








(15) Leukocytosis, unspecified


Qualifiers: 


   Leukocytosis type: unspecified   Qualified Code(s): D72.829 - Elevated white 

blood cell count, unspecified   


Is this a current diagnosis for this admission?: Yes   


Plan: 


3/22/2020


Likely secondary to infection and stress of current exacerbation of heart 

failure and atrial fibrillation.  Continue to monitor white blood cell count.


3/23/2020


Resolved








- Plan Summary


Summary: 


Patient is admitted in a imcu/telemetry bed where she will receive routine 

supportive and symptomatic cares.  She will be treated with IV fluids and s

upplemental oxygen via nasal cannula and/or BiPAP if required to maintain 

adequate oxygen saturation.  She will also receive adequate analgesia for her 

chest pain using both nonnarcotic and narcotic treatments.  A cardiology 

consultation will be obtained if her atrial fibrillation cannot be controlled 

with conversion to oral medications.  Patient was placed on Rocephin and 

Zithromax prophylactically/empirically in the emergency room though no obvious 

source of infection was noted.  Serial lactic acids will be obtained.  Serial 

troponin I levels will also be obtained.  A hemoglobin A1c, lipid profile and 

thyroid profile will be obtained.  CBCs, metabolic profiles and magnesium levels

will be obtained as appropriate.  Patient will use morphine sulfate 2 to 4 mg IV

every 2 hours as needed for severe pain.  An effort will be made to withdraw the

patient from the diltiazem infusion as soon as possible.  Before meals and at 

bedtime Accu-Cheks to be obtained with sliding scale insulin available for 

hyperglycemia and a hypoglycemic protocol in place.  Patient be continued on her

usual medications as appropriate once her medication list has been verified and 

reconciled.  Nutritionist consultation will be obtained for dietary evaluation 

and treatment of her obesity and diabetes.





- Time


Time Spent with patient: 15-24 minutes


Medications reviewed and adjusted accordingly: Yes

## 2020-03-24 LAB — VANCOMYCIN,TROUGH: 10.3 UG/ML (ref 5–20)

## 2020-03-24 RX ADMIN — DOCUSATE SODIUM SCH MG: 100 CAPSULE, LIQUID FILLED ORAL at 17:41

## 2020-03-24 RX ADMIN — Medication SCH ML: at 05:32

## 2020-03-24 RX ADMIN — GLIPIZIDE SCH MG: 5 TABLET, FILM COATED, EXTENDED RELEASE ORAL at 09:43

## 2020-03-24 RX ADMIN — ALPRAZOLAM PRN MG: 0.5 TABLET ORAL at 14:49

## 2020-03-24 RX ADMIN — PANTOPRAZOLE SODIUM SCH MG: 40 TABLET, DELAYED RELEASE ORAL at 17:41

## 2020-03-24 RX ADMIN — DOCUSATE SODIUM SCH MG: 100 CAPSULE, LIQUID FILLED ORAL at 09:42

## 2020-03-24 RX ADMIN — MEROPENEM SCH MLS/HR: 1 INJECTION INTRAVENOUS at 17:40

## 2020-03-24 RX ADMIN — HYDROCODONE BITARTRATE AND ACETAMINOPHEN PRN TAB: 5; 325 TABLET ORAL at 01:15

## 2020-03-24 RX ADMIN — APIXABAN SCH MG: 5 TABLET, FILM COATED ORAL at 09:42

## 2020-03-24 RX ADMIN — INSULIN HUMAN SCH UNIT: 100 INJECTION, SOLUTION PARENTERAL at 17:41

## 2020-03-24 RX ADMIN — INSULIN HUMAN SCH UNIT: 100 INJECTION, SOLUTION PARENTERAL at 13:21

## 2020-03-24 RX ADMIN — AMITRIPTYLINE HYDROCHLORIDE SCH MG: 50 TABLET, FILM COATED ORAL at 23:34

## 2020-03-24 RX ADMIN — METOPROLOL SUCCINATE SCH MG: 50 TABLET, EXTENDED RELEASE ORAL at 23:34

## 2020-03-24 RX ADMIN — MEROPENEM SCH MLS/HR: 1 INJECTION INTRAVENOUS at 01:16

## 2020-03-24 RX ADMIN — ZOLPIDEM TARTRATE SCH MG: 5 TABLET ORAL at 23:34

## 2020-03-24 RX ADMIN — TOLTERODINE TARTRATE SCH MG: 1 TABLET, FILM COATED ORAL at 09:43

## 2020-03-24 RX ADMIN — METOPROLOL SUCCINATE SCH MG: 50 TABLET, EXTENDED RELEASE ORAL at 09:40

## 2020-03-24 RX ADMIN — INSULIN HUMAN SCH UNIT: 100 INJECTION, SOLUTION PARENTERAL at 23:49

## 2020-03-24 RX ADMIN — TOLTERODINE TARTRATE SCH MG: 1 TABLET, FILM COATED ORAL at 23:33

## 2020-03-24 RX ADMIN — VENLAFAXINE HYDROCHLORIDE SCH MG: 75 CAPSULE, EXTENDED RELEASE ORAL at 09:42

## 2020-03-24 RX ADMIN — PANTOPRAZOLE SODIUM SCH MG: 40 TABLET, DELAYED RELEASE ORAL at 05:32

## 2020-03-24 RX ADMIN — LEVOTHYROXINE SODIUM SCH MG: 50 TABLET ORAL at 05:32

## 2020-03-24 RX ADMIN — ALPRAZOLAM PRN MG: 0.5 TABLET ORAL at 01:41

## 2020-03-24 RX ADMIN — FUROSEMIDE SCH MG: 40 TABLET ORAL at 09:42

## 2020-03-24 RX ADMIN — FLUTICASONE FUROATE, UMECLIDINIUM BROMIDE AND VILANTEROL TRIFENATATE SCH INHALER: 100; 62.5; 25 POWDER RESPIRATORY (INHALATION) at 09:40

## 2020-03-24 RX ADMIN — HYDROCODONE BITARTRATE AND ACETAMINOPHEN PRN TAB: 5; 325 TABLET ORAL at 23:32

## 2020-03-24 RX ADMIN — APIXABAN SCH MG: 5 TABLET, FILM COATED ORAL at 17:41

## 2020-03-24 RX ADMIN — SITAGLIPTIN SCH MG: 50 TABLET, FILM COATED ORAL at 09:39

## 2020-03-24 RX ADMIN — Medication SCH ML: at 23:54

## 2020-03-24 RX ADMIN — Medication SCH ML: at 13:21

## 2020-03-24 RX ADMIN — MAGNESIUM OXIDE TAB 400 MG (241.3 MG ELEMENTAL MG) SCH MG: 400 (241.3 MG) TAB at 09:42

## 2020-03-24 RX ADMIN — HYDROCODONE BITARTRATE AND ACETAMINOPHEN PRN TAB: 5; 325 TABLET ORAL at 13:22

## 2020-03-24 RX ADMIN — DIGOXIN SCH MG: 0.25 TABLET ORAL at 09:40

## 2020-03-24 RX ADMIN — MEROPENEM SCH MLS/HR: 1 INJECTION INTRAVENOUS at 10:01

## 2020-03-24 RX ADMIN — INSULIN HUMAN SCH UNIT: 100 INJECTION, SOLUTION PARENTERAL at 09:39

## 2020-03-24 RX ADMIN — VANCOMYCIN HYDROCHLORIDE SCH MLS/HR: 1 INJECTION, POWDER, LYOPHILIZED, FOR SOLUTION INTRAVENOUS at 05:32

## 2020-03-24 RX ADMIN — MAGNESIUM OXIDE TAB 400 MG (241.3 MG ELEMENTAL MG) SCH MG: 400 (241.3 MG) TAB at 17:41

## 2020-03-24 RX ADMIN — HYDROCODONE BITARTRATE AND ACETAMINOPHEN PRN TAB: 5; 325 TABLET ORAL at 17:43

## 2020-03-24 RX ADMIN — HYDROCODONE BITARTRATE AND ACETAMINOPHEN PRN TAB: 5; 325 TABLET ORAL at 05:42

## 2020-03-24 RX ADMIN — VANCOMYCIN HYDROCHLORIDE SCH MLS/HR: 1 INJECTION, POWDER, LYOPHILIZED, FOR SOLUTION INTRAVENOUS at 13:20

## 2020-03-24 NOTE — PDOC PROGRESS REPORT
Subjective


Progress Note for:: 03/24/20


Subjective:: 





No adverse events overnight.  Feels fatigued.  Not short of breath at rest but 

has not tried to get up and walk.  No cough.  No fevers.  Heart rate is in the 

80s in the 90s on monitor.


Reason For Visit: 


PAROXYSMAL AFIB WITH RVR,PAROXYSMAL COUGH,SEVERE








Physical Exam


Vital Signs: 


                                        











Temp Pulse Resp BP Pulse Ox


 


 98.6 F   97   16   149/64 H  94 


 


 03/24/20 12:47  03/24/20 14:00  03/24/20 12:47  03/24/20 12:47  03/24/20 12:47








                                 Intake & Output











 03/23/20 03/24/20 03/25/20





 06:59 06:59 06:59


 


Intake Total 2429 1626 790


 


Output Total 1000 6000 


 


Balance 1429 -3971 790


 


Weight 112.3 kg 112.3 kg 











General appearance: PRESENT: no acute distress, cooperative, disheveled, 

morbidly obese, other - Looks very fatigued


Respiratory exam: PRESENT: crackles - Bibasilar, symmetrical, unlabored.  

ABSENT: accessory muscle use, chest wall tenderness, prolonged expiratory phas, 

retraction, rhonchi, tachypnea, wheezes


Cardiovascular exam: PRESENT: irregular rhythm - Regular rate


Pulses: PRESENT: normal carotid pulses


Vascular exam: PRESENT: normal capillary refill


GI/Abdominal exam: PRESENT: normal bowel sounds, soft.  ABSENT: distended, 

guarding, rebound, tenderness


Extremities exam: PRESENT: +1 edema - Bilateral lower extremities.  ABSENT: 

clubbing, pedal edema


Musculoskeletal exam: PRESENT: normal inspection.  ABSENT: deformity


Neurological exam: PRESENT: awake, oriented to person, oriented to place, 

oriented to situation


Psychiatric exam: PRESENT: flat affect


Skin exam: PRESENT: dry, warm





Results


Laboratory Results: 


                                        





                                 03/23/20 05:40 





                                 03/24/20 04:47 





                                        











  03/24/20





  04:47


 


Creatinine  0.87


 


Est GFR ( Amer)  > 60








                                        





03/22/20 00:35   Blood   Blood Culture - Final


                            Staphylococcus Epidermidis





                                        











  03/21/20 03/21/20 03/21/20





  21:18 21:18 22:48


 


Creatine Kinase  Cancelled   120


 


CK-MB (CK-2)   1.61 


 


Troponin I   < 0.012 


 


NT-Pro-B Natriuret Pep   














  03/21/20 03/22/20 03/22/20





  22:48 00:35 04:31


 


Creatine Kinase   


 


CK-MB (CK-2)   


 


Troponin I   < 0.012  < 0.012


 


NT-Pro-B Natriuret Pep  831 H  














  03/22/20 03/22/20 03/22/20





  08:23 12:30 16:00


 


Creatine Kinase   


 


CK-MB (CK-2)   


 


Troponin I  < 0.012  < 0.012  < 0.012


 


NT-Pro-B Natriuret Pep   














  03/22/20





  20:20


 


Creatine Kinase 


 


CK-MB (CK-2) 


 


Troponin I  < 0.012


 


NT-Pro-B Natriuret Pep 











Impressions: 


                                        





Chest X-Ray  03/21/20 21:15


IMPRESSION:


Mild patchy basilar opacities are similar in appearance to prior


study. Clinical correlation is advised. The possibility of


infection is not excluded.


 








Chest/Abdomen CTA  03/21/20 22:09


IMPRESSION: 


 


No aortic dissection or aneurysm. No pulmonary embolus. No


definite pneumonia.


 














Assessment and Plan





- Diagnosis


(1) Acute on chronic diastolic heart failure


Is this a current diagnosis for this admission?: Yes   


Plan: 


She is getting oral Lasix once a day, but she still sounds a little fluid 

overloaded someone give her an extra dose of IV Lasix today








(2) Acute respiratory failure with hypoxia


Is this a current diagnosis for this admission?: Yes   


Plan: 


Currently stable on oxygen per nasal cannula, breathing comfortably.  She was 

also on vancomycin but I think that the edema is from heart failure and fluid 

overload and not from a pneumonia.








(3) Atrial fibrillation with RVR


Is this a current diagnosis for this admission?: Yes   


Plan: 


Rate is controlled with metoprolol and digoxin, appears like it might be a 

flutter pattern on the monitor, she is anticoagulated








(4) Cystitis


Is this a current diagnosis for this admission?: Yes   


Plan: 


She is growing a Citrobacter out of the urine.  Currently on meropenem, will 

consider switching to an oral agent








(5) Diabetes mellitus type 2 in obese


Is this a current diagnosis for this admission?: Yes   


Plan: 


We will continue a consistent carbohydrate diet and insulin sliding scale








(6) Morbid obesity with BMI of 40.0-44.9, adult


Is this a current diagnosis for this admission?: Yes   


Plan: 


Strongly encouraged lifestyle modification








- Plan Summary


Summary: 


Patient is admitted in a imcu/telemetry bed where she will receive routine 

supportive and symptomatic cares.  She will be treated with IV fluids and 

supplemental oxygen via nasal cannula and/or BiPAP if required to maintain 

adequate oxygen saturation.  She will also receive adequate analgesia for her 

chest pain using both nonnarcotic and narcotic treatments.  A cardiology 

consultation will be obtained if her atrial fibrillation cannot be controlled 

with conversion to oral medications.  Patient was placed on Rocephin and 

Zithromax prophylactically/empirically in the emergency room though no obvious 

source of infection was noted.  Serial lactic acids will be obtained.  Serial 

troponin I levels will also be obtained.  A hemoglobin A1c, lipid profile and 

thyroid profile will be obtained.  CBCs, metabolic profiles and magnesium levels

will be obtained as appropriate.  Patient will use morphine sulfate 2 to 4 mg IV

every 2 hours as needed for severe pain.  An effort will be made to withdraw the

patient from the diltiazem infusion as soon as possible.  Before meals and at 

bedtime Accu-Cheks to be obtained with sliding scale insulin available for 

hyperglycemia and a hypoglycemic protocol in place.  Patient be continued on her

usual medications as appropriate once her medication list has been verified and 

reconciled.  Nutritionist consultation will be obtained for dietary evaluation 

and treatment of her obesity and diabetes.





- Time


Time Spent with patient: 15-24 minutes

## 2020-03-25 LAB
ANION GAP SERPL CALC-SCNC: 11 MMOL/L (ref 5–19)
BUN SERPL-MCNC: 14 MG/DL (ref 7–20)
CALCIUM: 8.9 MG/DL (ref 8.4–10.2)
CHLORIDE SERPL-SCNC: 94 MMOL/L (ref 98–107)
CO2 SERPL-SCNC: 28 MMOL/L (ref 22–30)
GLUCOSE SERPL-MCNC: 173 MG/DL (ref 75–110)
POTASSIUM SERPL-SCNC: 4.7 MMOL/L (ref 3.6–5)

## 2020-03-25 RX ADMIN — LEVOTHYROXINE SODIUM SCH MG: 50 TABLET ORAL at 05:52

## 2020-03-25 RX ADMIN — DOCUSATE SODIUM SCH MG: 100 CAPSULE, LIQUID FILLED ORAL at 09:49

## 2020-03-25 RX ADMIN — ZOLPIDEM TARTRATE SCH MG: 5 TABLET ORAL at 21:10

## 2020-03-25 RX ADMIN — MEROPENEM SCH MLS/HR: 1 INJECTION INTRAVENOUS at 17:34

## 2020-03-25 RX ADMIN — APIXABAN SCH MG: 5 TABLET, FILM COATED ORAL at 09:49

## 2020-03-25 RX ADMIN — APIXABAN SCH MG: 5 TABLET, FILM COATED ORAL at 17:34

## 2020-03-25 RX ADMIN — DIGOXIN SCH MG: 0.25 TABLET ORAL at 09:49

## 2020-03-25 RX ADMIN — DOCUSATE SODIUM SCH MG: 100 CAPSULE, LIQUID FILLED ORAL at 17:34

## 2020-03-25 RX ADMIN — INSULIN HUMAN SCH UNIT: 100 INJECTION, SOLUTION PARENTERAL at 09:48

## 2020-03-25 RX ADMIN — MEROPENEM SCH MLS/HR: 1 INJECTION INTRAVENOUS at 09:48

## 2020-03-25 RX ADMIN — VENLAFAXINE HYDROCHLORIDE SCH MG: 75 CAPSULE, EXTENDED RELEASE ORAL at 09:49

## 2020-03-25 RX ADMIN — METOPROLOL SUCCINATE SCH MG: 50 TABLET, EXTENDED RELEASE ORAL at 09:49

## 2020-03-25 RX ADMIN — PANTOPRAZOLE SODIUM SCH MG: 40 TABLET, DELAYED RELEASE ORAL at 17:34

## 2020-03-25 RX ADMIN — Medication SCH: at 14:40

## 2020-03-25 RX ADMIN — FLUTICASONE FUROATE, UMECLIDINIUM BROMIDE AND VILANTEROL TRIFENATATE SCH INHALER: 100; 62.5; 25 POWDER RESPIRATORY (INHALATION) at 09:50

## 2020-03-25 RX ADMIN — MEROPENEM SCH MLS/HR: 1 INJECTION INTRAVENOUS at 02:34

## 2020-03-25 RX ADMIN — INSULIN HUMAN SCH UNIT: 100 INJECTION, SOLUTION PARENTERAL at 17:34

## 2020-03-25 RX ADMIN — SITAGLIPTIN SCH MG: 50 TABLET, FILM COATED ORAL at 09:48

## 2020-03-25 RX ADMIN — FUROSEMIDE SCH MG: 40 TABLET ORAL at 09:49

## 2020-03-25 RX ADMIN — Medication SCH ML: at 21:29

## 2020-03-25 RX ADMIN — MAGNESIUM OXIDE TAB 400 MG (241.3 MG ELEMENTAL MG) SCH MG: 400 (241.3 MG) TAB at 17:34

## 2020-03-25 RX ADMIN — AMITRIPTYLINE HYDROCHLORIDE SCH MG: 50 TABLET, FILM COATED ORAL at 21:10

## 2020-03-25 RX ADMIN — INSULIN HUMAN SCH: 100 INJECTION, SOLUTION PARENTERAL at 21:30

## 2020-03-25 RX ADMIN — METOPROLOL SUCCINATE SCH MG: 50 TABLET, EXTENDED RELEASE ORAL at 21:10

## 2020-03-25 RX ADMIN — PANTOPRAZOLE SODIUM SCH MG: 40 TABLET, DELAYED RELEASE ORAL at 05:51

## 2020-03-25 RX ADMIN — Medication SCH ML: at 05:51

## 2020-03-25 RX ADMIN — GLIPIZIDE SCH MG: 5 TABLET, FILM COATED, EXTENDED RELEASE ORAL at 09:49

## 2020-03-25 RX ADMIN — TOLTERODINE TARTRATE SCH MG: 1 TABLET, FILM COATED ORAL at 09:49

## 2020-03-25 RX ADMIN — MAGNESIUM OXIDE TAB 400 MG (241.3 MG ELEMENTAL MG) SCH MG: 400 (241.3 MG) TAB at 09:49

## 2020-03-25 RX ADMIN — INSULIN HUMAN SCH: 100 INJECTION, SOLUTION PARENTERAL at 12:00

## 2020-03-25 RX ADMIN — TOLTERODINE TARTRATE SCH MG: 1 TABLET, FILM COATED ORAL at 21:10

## 2020-03-25 RX ADMIN — ALPRAZOLAM PRN MG: 0.5 TABLET ORAL at 21:10

## 2020-03-25 NOTE — PDOC PROGRESS REPORT
Subjective


Progress Note for:: 03/25/20


Subjective:: 





No adverse events overnight.  Good urine output yesterday.  Breathing feels 

better.  Heart rate is in a good range.


Reason For Visit: 


PAROXYSMAL AFIB WITH RVR,PAROXYSMAL COUGH,SEVERE








Physical Exam


Vital Signs: 


                                        











Temp Pulse Resp BP Pulse Ox


 


 97.7 F   73   20   114/55 L  95 


 


 03/25/20 12:23  03/25/20 14:00  03/25/20 12:23  03/25/20 12:23  03/25/20 12:23








                                 Intake & Output











 03/24/20 03/25/20 03/26/20





 06:59 06:59 06:59


 


Intake Total 1626 1850 50


 


Output Total 6000 1200 


 


Balance -4374 650 50


 


Weight 112.3 kg  








General appearance: PRESENT: no acute distress, cooperative, disheveled, 

morbidly obese


Respiratory exam: PRESENT: Decreased breath sounds, symmetrical, unlabored.  

ABSENT: accessory muscle use, chest wall tenderness, prolonged expiratory phas, 

retraction, rhonchi, tachypnea, wheezes, crackles


Cardiovascular exam: PRESENT: irregular rhythm - Regular rate


Pulses: PRESENT: normal carotid pulses


Vascular exam: PRESENT: normal capillary refill


GI/Abdominal exam: PRESENT: normal bowel sounds, soft.  ABSENT: distended, 

guarding, rebound, tenderness


Extremities exam: PRESENT: +1 edema - Bilateral lower extremities.  ABSENT: 

clubbing, pedal edema


Musculoskeletal exam: PRESENT: normal inspection.  ABSENT: deformity


Neurological exam: PRESENT: awake, oriented to person, oriented to place, 

oriented to situation


Psychiatric exam: PRESENT: flat affect


Skin exam: PRESENT: dry, warm





Results


Laboratory Results: 


                                        





                                 03/23/20 05:40 





                                 03/25/20 07:07 





                                        











  03/25/20





  07:07


 


Sodium  132.9 L


 


Potassium  4.7


 


Chloride  94 L


 


Carbon Dioxide  28


 


Anion Gap  11


 


BUN  14


 


Creatinine  0.92


 


Est GFR (African Amer)  > 60


 


Glucose  173 H


 


Calcium  8.9








                                        





03/22/20 12:50   Clean Catch Midstream   Urine Culture - Final


                            Citrobacter Freundii


                            Streptococcus Bovis Grp


                            Group B Beta Streptococcus





                                        











  03/21/20 03/21/20 03/21/20





  21:18 21:18 22:48


 


Creatine Kinase  Cancelled   120


 


CK-MB (CK-2)   1.61 


 


Troponin I   < 0.012 


 


NT-Pro-B Natriuret Pep   














  03/21/20 03/22/20 03/22/20





  22:48 00:35 04:31


 


Creatine Kinase   


 


CK-MB (CK-2)   


 


Troponin I   < 0.012  < 0.012


 


NT-Pro-B Natriuret Pep  831 H  














  03/22/20 03/22/20 03/22/20





  08:23 12:30 16:00


 


Creatine Kinase   


 


CK-MB (CK-2)   


 


Troponin I  < 0.012  < 0.012  < 0.012


 


NT-Pro-B Natriuret Pep   














  03/22/20





  20:20


 


Creatine Kinase 


 


CK-MB (CK-2) 


 


Troponin I  < 0.012


 


NT-Pro-B Natriuret Pep 











Impressions: 


                                        





Chest X-Ray  03/21/20 21:15


IMPRESSION:


Mild patchy basilar opacities are similar in appearance to prior


study. Clinical correlation is advised. The possibility of


infection is not excluded.


 








Chest/Abdomen CTA  03/21/20 22:09


IMPRESSION: 


 


No aortic dissection or aneurysm. No pulmonary embolus. No


definite pneumonia.


 














Assessment and Plan





- Diagnosis


(1) Acute on chronic diastolic heart failure


Is this a current diagnosis for this admission?: Yes   


Plan: 


She is getting oral Lasix once a day, responding well to fluid restriction.  She

got a single dose of IV Lasix yesterday and that has removed a lot of the excess

fluid.








(2) Acute respiratory failure with hypoxia


Is this a current diagnosis for this admission?: Yes   


Plan: 


Currently stable on oxygen per nasal cannula, breathing comfortably.  She was 

also on vancomycin but I think that the edema is from heart failure and fluid 

overload and not from a pneumonia.








(3) Atrial fibrillation with RVR


Is this a current diagnosis for this admission?: Yes   


Plan: 


Rate is controlled with metoprolol and digoxin, appears like it might be a 

flutter pattern on the monitor, she is anticoagulated








(4) Cystitis


Is this a current diagnosis for this admission?: Yes   


Plan: 


She is growing a Citrobacter out of the urine.  Currently on meropenem, will 

consider switching to an oral agent at discharge








(5) Diabetes mellitus type 2 in obese


Is this a current diagnosis for this admission?: Yes   


Plan: 


We will continue a consistent carbohydrate diet and insulin sliding scale








(6) Morbid obesity with BMI of 40.0-44.9, adult


Is this a current diagnosis for this admission?: Yes   


Plan: 


Strongly encouraged lifestyle modification








- Plan Summary


Summary: 


Patient is admitted in a imcu/telemetry bed where she will receive routine 

supportive and symptomatic cares.  She will be treated with IV fluids and 

supplemental oxygen via nasal cannula and/or BiPAP if required to maintain 

adequate oxygen saturation.  She will also receive adequate analgesia for her 

chest pain using both nonnarcotic and narcotic treatments.  A cardiology 

consultation will be obtained if her atrial fibrillation cannot be controlled 

with conversion to oral medications.  Patient was placed on Rocephin and 

Zithromax prophylactically/empirically in the emergency room though no obvious 

source of infection was noted.  Serial lactic acids will be obtained.  Serial 

troponin I levels will also be obtained.  A hemoglobin A1c, lipid profile and 

thyroid profile will be obtained.  CBCs, metabolic profiles and magnesium levels

will be obtained as appropriate.  Patient will use morphine sulfate 2 to 4 mg IV

every 2 hours as needed for severe pain.  An effort will be made to withdraw the

patient from the diltiazem infusion as soon as possible.  Before meals and at 

bedtime Accu-Cheks to be obtained with sliding scale insulin available for 

hyperglycemia and a hypoglycemic protocol in place.  Patient be continued on her

usual medications as appropriate once her medication list has been verified and 

reconciled.  Nutritionist consultation will be obtained for dietary evaluation 

and treatment of her obesity and diabetes.





- Time


Time Spent with patient: 15-24 minutes

## 2020-03-26 LAB
ANION GAP SERPL CALC-SCNC: 7 MMOL/L (ref 5–19)
BUN SERPL-MCNC: 13 MG/DL (ref 7–20)
CALCIUM: 8.8 MG/DL (ref 8.4–10.2)
CHLORIDE SERPL-SCNC: 95 MMOL/L (ref 98–107)
CO2 SERPL-SCNC: 33 MMOL/L (ref 22–30)
GLUCOSE SERPL-MCNC: 141 MG/DL (ref 75–110)
POTASSIUM SERPL-SCNC: 4.2 MMOL/L (ref 3.6–5)

## 2020-03-26 RX ADMIN — METOPROLOL SUCCINATE SCH MG: 50 TABLET, EXTENDED RELEASE ORAL at 21:34

## 2020-03-26 RX ADMIN — MAGNESIUM OXIDE TAB 400 MG (241.3 MG ELEMENTAL MG) SCH MG: 400 (241.3 MG) TAB at 11:00

## 2020-03-26 RX ADMIN — ALPRAZOLAM PRN MG: 0.5 TABLET ORAL at 21:34

## 2020-03-26 RX ADMIN — MEROPENEM SCH MLS/HR: 1 INJECTION INTRAVENOUS at 18:00

## 2020-03-26 RX ADMIN — ZOLPIDEM TARTRATE SCH MG: 5 TABLET ORAL at 21:34

## 2020-03-26 RX ADMIN — FUROSEMIDE SCH MG: 40 TABLET ORAL at 10:58

## 2020-03-26 RX ADMIN — Medication SCH ML: at 18:05

## 2020-03-26 RX ADMIN — AMITRIPTYLINE HYDROCHLORIDE SCH MG: 50 TABLET, FILM COATED ORAL at 21:34

## 2020-03-26 RX ADMIN — TOLTERODINE TARTRATE SCH MG: 1 TABLET, FILM COATED ORAL at 11:00

## 2020-03-26 RX ADMIN — Medication SCH ML: at 21:36

## 2020-03-26 RX ADMIN — GLIPIZIDE SCH MG: 5 TABLET, FILM COATED, EXTENDED RELEASE ORAL at 10:58

## 2020-03-26 RX ADMIN — INSULIN HUMAN SCH: 100 INJECTION, SOLUTION PARENTERAL at 21:36

## 2020-03-26 RX ADMIN — APIXABAN SCH MG: 5 TABLET, FILM COATED ORAL at 11:09

## 2020-03-26 RX ADMIN — DOCUSATE SODIUM SCH MG: 100 CAPSULE, LIQUID FILLED ORAL at 10:59

## 2020-03-26 RX ADMIN — Medication SCH ML: at 06:15

## 2020-03-26 RX ADMIN — MEROPENEM SCH MLS/HR: 1 INJECTION INTRAVENOUS at 02:39

## 2020-03-26 RX ADMIN — DIGOXIN SCH MG: 0.25 TABLET ORAL at 11:01

## 2020-03-26 RX ADMIN — FLUTICASONE FUROATE, UMECLIDINIUM BROMIDE AND VILANTEROL TRIFENATATE SCH: 100; 62.5; 25 POWDER RESPIRATORY (INHALATION) at 11:10

## 2020-03-26 RX ADMIN — PANTOPRAZOLE SODIUM SCH MG: 40 TABLET, DELAYED RELEASE ORAL at 06:15

## 2020-03-26 RX ADMIN — INSULIN HUMAN SCH: 100 INJECTION, SOLUTION PARENTERAL at 21:35

## 2020-03-26 RX ADMIN — MAGNESIUM OXIDE TAB 400 MG (241.3 MG ELEMENTAL MG) SCH MG: 400 (241.3 MG) TAB at 18:01

## 2020-03-26 RX ADMIN — DOCUSATE SODIUM SCH MG: 100 CAPSULE, LIQUID FILLED ORAL at 18:01

## 2020-03-26 RX ADMIN — VENLAFAXINE HYDROCHLORIDE SCH MG: 75 CAPSULE, EXTENDED RELEASE ORAL at 10:59

## 2020-03-26 RX ADMIN — TOLTERODINE TARTRATE SCH MG: 1 TABLET, FILM COATED ORAL at 21:35

## 2020-03-26 RX ADMIN — MEROPENEM SCH MLS/HR: 1 INJECTION INTRAVENOUS at 10:58

## 2020-03-26 RX ADMIN — APIXABAN SCH MG: 5 TABLET, FILM COATED ORAL at 18:01

## 2020-03-26 RX ADMIN — LEVOTHYROXINE SODIUM SCH MG: 50 TABLET ORAL at 06:15

## 2020-03-26 RX ADMIN — METOPROLOL SUCCINATE SCH MG: 50 TABLET, EXTENDED RELEASE ORAL at 11:00

## 2020-03-26 RX ADMIN — INSULIN HUMAN SCH: 100 INJECTION, SOLUTION PARENTERAL at 11:01

## 2020-03-26 RX ADMIN — SITAGLIPTIN SCH MG: 50 TABLET, FILM COATED ORAL at 10:59

## 2020-03-26 NOTE — PDOC PROGRESS REPORT
Subjective


Progress Note for:: 03/26/20


Subjective:: 





54 year old female who presented to the emergency room with an acute cough.  She

admits the sudden development of a severe paroxysmal cough at approximately 5 PM

on the day prior to admission.  Her cough caused her to have severe pain in her 

ribs/chest bilaterally.  Her cough was accompanied by moderate to severe dyspnea

and associated with mild swelling in her hands and feet.  She denies other 

associated or accompanying signs and symptoms.  She denies prior similar 

episodes.  She admits exposure to a person who had active influenza 72 to 96 

hours prior to her presentation.  She has not identified any aggravating or 

ameliorating factors for her cough.  In the emergency room she was found to have

an elevated white blood count at 13,900 and a elevated lactic acid of 2.5.  She 

was noted to be tachypneic, tachycardic (A. fib with RVR) and hypoxic on 

evaluation.  She was treated with IV diltiazem using a bolus and continuous 

infusion for her tachycardia without significant improvement.  He was 

subsequently given IV metoprolol also with a less than desired response.  She 

also received initial antibiotic therapy in the emergency room for a possible 

pneumonia although no pneumonia was noted on her pulmonary evaluations including

a CT scan.  A coronavirus screening was administered in the ER.  Patient was 

subsequently admitted to the hospital for further evaluation and treatment.








3/21/20-The patient is short of breath.  She feels gurgling breath sounds.  She 

looks quite uncomfortable.





3/23/20-No adverse events overnight.  Feels fatigued.  Not short of breath at 

rest but has not tried to get up and walk.  No cough.  No fevers.  Heart rate is

in the 80s in the 90s on monitor.





3/25/20-No adverse events overnight.  Good urine output yesterday.  Breathing 

feels better.  Heart rate is in a good range.





3/26/2020-patient is comfortable in the bed denies any problems.  No acute 

events in the last 24 hours.  Pulse ox is 95% 2 L.  Flu is negative.  Urine 

culture is positive for Citrobacter on meropenem.  For A. fib she is on 

metoprolol and digoxin.


Reason For Visit: 


PAROXYSMAL AFIB WITH RVR,PAROXYSMAL COUGH,SEVERE








Physical Exam


Vital Signs: 


                                        











Temp Pulse Resp BP Pulse Ox


 


 97.8 F   68   20   125/57 L  98 


 


 03/26/20 07:52  03/26/20 07:52  03/26/20 07:52  03/26/20 07:52  03/26/20 07:52








                                 Intake & Output











 03/25/20 03/26/20 03/27/20





 06:59 06:59 06:59


 


Intake Total 1850 660 


 


Output Total 1200 1000 


 


Balance 650 -340 


 


Weight  109.5 kg 











General appearance: PRESENT: no acute distress, obese, well-developed


Head exam: PRESENT: atraumatic


Eye exam: PRESENT: PERRLA


Mouth exam: PRESENT: moist, tongue midline


Neck exam: ABSENT: carotid bruit, JVD, lymphadenopathy, thyromegaly


Respiratory exam: PRESENT: decreased breath sounds


Cardiovascular exam: PRESENT: RRR.  ABSENT: diastolic murmur, rubs, systolic 

murmur


Pulses: PRESENT: normal dorsalis pedis pul


GI/Abdominal exam: PRESENT: normal bowel sounds, soft.  ABSENT: distended, 

guarding, mass, organolmegaly, rebound, tenderness


Rectal exam: PRESENT: deferred


Extremities exam: PRESENT: full ROM.  ABSENT: calf tenderness, clubbing, pedal 

edema


Neurological exam: PRESENT: alert, awake, oriented to person, oriented to place,

oriented to time, oriented to situation, CN II-XII grossly intact.  ABSENT: 

motor sensory deficit


Psychiatric exam: PRESENT: appropriate affect, normal mood.  ABSENT: homicidal 

ideation, suicidal ideation





Results


Laboratory Results: 


                                        





                                 03/23/20 05:40 





                                 03/26/20 05:28 





                                        











  03/26/20





  05:28


 


Sodium  134.5 L


 


Potassium  4.2


 


Chloride  95 L


 


Carbon Dioxide  33 H


 


Anion Gap  7


 


BUN  13


 


Creatinine  0.83


 


Est GFR (African Amer)  > 60


 


Glucose  141 H


 


Calcium  8.8








                                        





03/22/20 12:50   Clean Catch Midstream   Urine Culture - Final


                            Citrobacter Freundii


                            Streptococcus Bovis Grp


                            Group B Beta Streptococcus





                                        











  03/21/20 03/21/20 03/21/20





  21:18 21:18 22:48


 


Creatine Kinase  Cancelled   120


 


CK-MB (CK-2)   1.61 


 


Troponin I   < 0.012 


 


NT-Pro-B Natriuret Pep   














  03/21/20 03/22/20 03/22/20





  22:48 00:35 04:31


 


Creatine Kinase   


 


CK-MB (CK-2)   


 


Troponin I   < 0.012  < 0.012


 


NT-Pro-B Natriuret Pep  831 H  














  03/22/20 03/22/20 03/22/20





  08:23 12:30 16:00


 


Creatine Kinase   


 


CK-MB (CK-2)   


 


Troponin I  < 0.012  < 0.012  < 0.012


 


NT-Pro-B Natriuret Pep   














  03/22/20





  20:20


 


Creatine Kinase 


 


CK-MB (CK-2) 


 


Troponin I  < 0.012


 


NT-Pro-B Natriuret Pep 











Impressions: 


                                        





Chest X-Ray  03/21/20 21:15


IMPRESSION:


Mild patchy basilar opacities are similar in appearance to prior


study. Clinical correlation is advised. The possibility of


infection is not excluded.


 








Chest/Abdomen CTA  03/21/20 22:09


IMPRESSION: 


 


No aortic dissection or aneurysm. No pulmonary embolus. No


definite pneumonia.


 














Assessment and Plan





- Diagnosis


(1) Acute on chronic diastolic heart failure


Is this a current diagnosis for this admission?: Yes   


Plan: 


She is getting oral Lasix once a day, responding well to fluid restriction.  She

got a single dose of IV Lasix yesterday and that has removed a lot of the excess

fluid.





3/26/2020-on examination patient is euvolemic.  Receiving p.o. Lasix 40 mg d

aily..








(2) Acute respiratory failure with hypoxia


Is this a current diagnosis for this admission?: Yes   


Plan: 


Currently stable on oxygen per nasal cannula, breathing comfortably.  She was 

also on vancomycin but I think that the edema is from heart failure and fluid 

overload and not from a pneumonia.





3/26/2020-pulse ox is 95% on 2 L.  Comfortable in the bed communicating well.  

On examination bilateral entry was decreased no wheezing no crepitations 

present.  covid testing is pending.  Flu is negative.








(3) Atrial fibrillation with RVR


Is this a current diagnosis for this admission?: Yes   


Plan: 


Rate is controlled with metoprolol and digoxin, appears like it might be a 

flutter pattern on the monitor, she is anticoagulated





3/26/2020-patient is on a fixed band for atrial fibrillation and for rate 

control she is on digoxin and metoprolol.  Heart rates in the 90s.  In sinus 

rhythm.








(4) Cystitis


Is this a current diagnosis for this admission?: Yes   


Plan: 


She is growing a Citrobacter out of the urine.  Currently on meropenem, will 

consider switching to an oral agent at discharge








3/26/2020-urine culture is growing Citrobacter, Streptococcus bovis group, group

B streptococcus.  Presently on meropenem.  Afebrile.  Plan is to continue the 

present management.








(5) Diabetes mellitus type 2 in obese


Is this a current diagnosis for this admission?: No   


Plan: 


We will continue a consistent carbohydrate diet and insulin sliding scale





3/26/2020-latest blood sugar is 143.  hemoGlobin A1c is 11.3.  Diet exercise 

weight loss lifestyle modifications discussed with the patient.








(6) Morbid obesity with BMI of 40.0-44.9, adult


Is this a current diagnosis for this admission?: No   


Plan: 


Strongly encouraged lifestyle modification








- Plan Summary


Summary: 


Patient is admitted in a imcu/telemetry bed where she will receive routine 

supportive and symptomatic cares.  She will be treated with IV fluids and 

supplemental oxygen via nasal cannula and/or BiPAP if required to maintain 

adequate oxygen saturation.  She will also receive adequate analgesia for her 

chest pain using both nonnarcotic and narcotic treatments.  A cardiology 

consultation will be obtained if her atrial fibrillation cannot be controlled 

with conversion to oral medications.  Patient was placed on Rocephin and 

Zithromax prophylactically/empirically in the emergency room though no obvious 

source of infection was noted.  Serial lactic acids will be obtained.  Serial 

troponin I levels will also be obtained.  A hemoglobin A1c, lipid profile and t

hyroid profile will be obtained.  CBCs, metabolic profiles and magnesium levels 

will be obtained as appropriate.  Patient will use morphine sulfate 2 to 4 mg IV

every 2 hours as needed for severe pain.  An effort will be made to withdraw the

patient from the diltiazem infusion as soon as possible.  Before meals and at 

bedtime Accu-Cheks to be obtained with sliding scale insulin available for hyper

glycemia and a hypoglycemic protocol in place.  Patient be continued on her 

usual medications as appropriate once her medication list has been verified and 

reconciled.  Nutritionist consultation will be obtained for dietary evaluation 

and treatment of her obesity and diabetes.

## 2020-03-27 LAB
ADD MANUAL DIFF: NO
ANION GAP SERPL CALC-SCNC: 7 MMOL/L (ref 5–19)
BASOPHILS # BLD AUTO: 0.1 10^3/UL (ref 0–0.2)
BASOPHILS NFR BLD AUTO: 0.9 % (ref 0–2)
BUN SERPL-MCNC: 11 MG/DL (ref 7–20)
CALCIUM: 9.1 MG/DL (ref 8.4–10.2)
CHLORIDE SERPL-SCNC: 96 MMOL/L (ref 98–107)
CO2 SERPL-SCNC: 32 MMOL/L (ref 22–30)
EOSINOPHIL # BLD AUTO: 0.2 10^3/UL (ref 0–0.6)
EOSINOPHIL NFR BLD AUTO: 2.3 % (ref 0–6)
ERYTHROCYTE [DISTWIDTH] IN BLOOD BY AUTOMATED COUNT: 13.7 % (ref 11.5–14)
GLUCOSE SERPL-MCNC: 141 MG/DL (ref 75–110)
HCT VFR BLD CALC: 39.9 % (ref 36–47)
HGB BLD-MCNC: 13.3 G/DL (ref 12–15.5)
LYMPHOCYTES # BLD AUTO: 2.7 10^3/UL (ref 0.5–4.7)
LYMPHOCYTES NFR BLD AUTO: 35.1 % (ref 13–45)
MCH RBC QN AUTO: 32 PG (ref 27–33.4)
MCHC RBC AUTO-ENTMCNC: 33.4 G/DL (ref 32–36)
MCV RBC AUTO: 96 FL (ref 80–97)
MONOCYTES # BLD AUTO: 0.4 10^3/UL (ref 0.1–1.4)
MONOCYTES NFR BLD AUTO: 5.8 % (ref 3–13)
NEUTROPHILS # BLD AUTO: 4.3 10^3/UL (ref 1.7–8.2)
NEUTS SEG NFR BLD AUTO: 55.9 % (ref 42–78)
PLATELET # BLD: 306 10^3/UL (ref 150–450)
POTASSIUM SERPL-SCNC: 4.2 MMOL/L (ref 3.6–5)
RBC # BLD AUTO: 4.17 10^6/UL (ref 3.72–5.28)
TOTAL CELLS COUNTED % (AUTO): 100 %
WBC # BLD AUTO: 7.6 10^3/UL (ref 4–10.5)

## 2020-03-27 RX ADMIN — FUROSEMIDE SCH MG: 40 TABLET ORAL at 09:39

## 2020-03-27 RX ADMIN — Medication SCH: at 21:23

## 2020-03-27 RX ADMIN — DOCUSATE SODIUM SCH MG: 100 CAPSULE, LIQUID FILLED ORAL at 09:39

## 2020-03-27 RX ADMIN — METOPROLOL SUCCINATE SCH MG: 50 TABLET, EXTENDED RELEASE ORAL at 09:40

## 2020-03-27 RX ADMIN — MAGNESIUM OXIDE TAB 400 MG (241.3 MG ELEMENTAL MG) SCH MG: 400 (241.3 MG) TAB at 09:39

## 2020-03-27 RX ADMIN — SITAGLIPTIN SCH MG: 50 TABLET, FILM COATED ORAL at 09:40

## 2020-03-27 RX ADMIN — TOLTERODINE TARTRATE SCH MG: 1 TABLET, FILM COATED ORAL at 09:40

## 2020-03-27 RX ADMIN — MEROPENEM SCH MLS/HR: 1 INJECTION INTRAVENOUS at 17:56

## 2020-03-27 RX ADMIN — Medication SCH ML: at 17:56

## 2020-03-27 RX ADMIN — MAGNESIUM OXIDE TAB 400 MG (241.3 MG ELEMENTAL MG) SCH MG: 400 (241.3 MG) TAB at 17:55

## 2020-03-27 RX ADMIN — INSULIN HUMAN SCH: 100 INJECTION, SOLUTION PARENTERAL at 16:30

## 2020-03-27 RX ADMIN — PANTOPRAZOLE SODIUM SCH MG: 40 TABLET, DELAYED RELEASE ORAL at 05:07

## 2020-03-27 RX ADMIN — METOPROLOL SUCCINATE SCH MG: 50 TABLET, EXTENDED RELEASE ORAL at 21:23

## 2020-03-27 RX ADMIN — APIXABAN SCH MG: 5 TABLET, FILM COATED ORAL at 09:39

## 2020-03-27 RX ADMIN — VENLAFAXINE HYDROCHLORIDE SCH MG: 75 CAPSULE, EXTENDED RELEASE ORAL at 09:40

## 2020-03-27 RX ADMIN — INSULIN HUMAN SCH: 100 INJECTION, SOLUTION PARENTERAL at 21:24

## 2020-03-27 RX ADMIN — DOCUSATE SODIUM SCH MG: 100 CAPSULE, LIQUID FILLED ORAL at 17:55

## 2020-03-27 RX ADMIN — HYDROCODONE BITARTRATE AND ACETAMINOPHEN PRN TAB: 5; 325 TABLET ORAL at 09:46

## 2020-03-27 RX ADMIN — Medication SCH ML: at 05:08

## 2020-03-27 RX ADMIN — FLUTICASONE FUROATE, UMECLIDINIUM BROMIDE AND VILANTEROL TRIFENATATE SCH INHALER: 100; 62.5; 25 POWDER RESPIRATORY (INHALATION) at 09:44

## 2020-03-27 RX ADMIN — TOLTERODINE TARTRATE SCH MG: 1 TABLET, FILM COATED ORAL at 21:22

## 2020-03-27 RX ADMIN — APIXABAN SCH MG: 5 TABLET, FILM COATED ORAL at 17:55

## 2020-03-27 RX ADMIN — GLIPIZIDE SCH MG: 5 TABLET, FILM COATED, EXTENDED RELEASE ORAL at 09:40

## 2020-03-27 RX ADMIN — MEROPENEM SCH MLS/HR: 1 INJECTION INTRAVENOUS at 02:36

## 2020-03-27 RX ADMIN — DIGOXIN SCH MG: 0.25 TABLET ORAL at 09:41

## 2020-03-27 RX ADMIN — AMITRIPTYLINE HYDROCHLORIDE SCH MG: 50 TABLET, FILM COATED ORAL at 21:22

## 2020-03-27 RX ADMIN — MEROPENEM SCH MLS/HR: 1 INJECTION INTRAVENOUS at 09:40

## 2020-03-27 RX ADMIN — LEVOTHYROXINE SODIUM SCH MG: 50 TABLET ORAL at 05:08

## 2020-03-27 RX ADMIN — INSULIN HUMAN SCH: 100 INJECTION, SOLUTION PARENTERAL at 08:51

## 2020-03-27 RX ADMIN — INSULIN HUMAN SCH UNIT: 100 INJECTION, SOLUTION PARENTERAL at 12:44

## 2020-03-27 RX ADMIN — ZOLPIDEM TARTRATE SCH MG: 5 TABLET ORAL at 21:23

## 2020-03-27 RX ADMIN — ALPRAZOLAM PRN MG: 0.5 TABLET ORAL at 21:23

## 2020-03-27 RX ADMIN — HYDROCODONE BITARTRATE AND ACETAMINOPHEN PRN TAB: 5; 325 TABLET ORAL at 02:36

## 2020-03-27 NOTE — PDOC PROGRESS REPORT
Subjective


Progress Note for:: 03/27/20


Subjective:: 





54 year old female who presented to the emergency room with an acute cough.  She

admits the sudden development of a severe paroxysmal cough at approximately 5 PM

on the day prior to admission.  Her cough caused her to have severe pain in her 

ribs/chest bilaterally.  Her cough was accompanied by moderate to severe dyspnea

and associated with mild swelling in her hands and feet.  She denies other 

associated or accompanying signs and symptoms.  She denies prior similar 

episodes.  She admits exposure to a person who had active influenza 72 to 96 

hours prior to her presentation.  She has not identified any aggravating or 

ameliorating factors for her cough.  In the emergency room she was found to have

an elevated white blood count at 13,900 and a elevated lactic acid of 2.5.  She 

was noted to be tachypneic, tachycardic (A. fib with RVR) and hypoxic on 

evaluation.  She was treated with IV diltiazem using a bolus and continuous 

infusion for her tachycardia without significant improvement.  He was 

subsequently given IV metoprolol also with a less than desired response.  She 

also received initial antibiotic therapy in the emergency room for a possible 

pneumonia although no pneumonia was noted on her pulmonary evaluations including

a CT scan.  A coronavirus screening was administered in the ER.  Patient was 

subsequently admitted to the hospital for further evaluation and treatment.








3/21/20-The patient is short of breath.  She feels gurgling breath sounds.  She 

looks quite uncomfortable.





3/23/20-No adverse events overnight.  Feels fatigued.  Not short of breath at 

rest but has not tried to get up and walk.  No cough.  No fevers.  Heart rate is

in the 80s in the 90s on monitor.





3/25/20-No adverse events overnight.  Good urine output yesterday.  Breathing 

feels better.  Heart rate is in a good range.





3/26/2020-patient is comfortable in the bed denies any problems.  No acute 

events in the last 24 hours.  Pulse ox is 95% 2 L.  Flu is negative.  Urine 

culture is positive for Citrobacter on meropenem.  For A. fib she is on 

metoprolol and digoxin.





3/27/2020-comfortably in the bed communicating well.  Not in distress.  

Receiving meropenem IV for Citrobacter in the urine.  Plan is to switch the 

antibiotics to p.o. from today if possible and possible discharge tomorrow.


Reason For Visit: 


PAROXYSMAL AFIB WITH RVR,PAROXYSMAL COUGH,SEVERE








Physical Exam


Vital Signs: 


                                        











Temp Pulse Resp BP Pulse Ox


 


 98.7 F   71   18   133/96 H  90 L


 


 03/27/20 06:07  03/27/20 06:07  03/27/20 06:07  03/27/20 06:07  03/27/20 06:07








                                 Intake & Output











 03/26/20 03/27/20 03/28/20





 06:59 06:59 06:59


 


Intake Total 660 1720 


 


Output Total 1000 2875 


 


Balance -340 -1155 


 


Weight 109.5 kg 108.5 kg 











General appearance: PRESENT: no acute distress, morbidly obese


Head exam: PRESENT: atraumatic


Eye exam: PRESENT: PERRLA


Mouth exam: PRESENT: moist, tongue midline


Teeth exam: PRESENT: poor dentation


Neck exam: ABSENT: carotid bruit, JVD, lymphadenopathy, thyromegaly


Respiratory exam: PRESENT: decreased breath sounds


Cardiovascular exam: PRESENT: tachycardia


GI/Abdominal exam: PRESENT: normal bowel sounds, soft.  ABSENT: distended, 

guarding, mass, organolmegaly, rebound, tenderness


Rectal exam: PRESENT: deferred


Extremities exam: PRESENT: full ROM.  ABSENT: calf tenderness, clubbing, pedal 

edema


Neurological exam: PRESENT: alert, awake, oriented to person, oriented to place,

oriented to time, oriented to situation, CN II-XII grossly intact.  ABSENT: 

motor sensory deficit


Psychiatric exam: PRESENT: appropriate affect, normal mood.  ABSENT: homicidal 

ideation, suicidal ideation





Results


Laboratory Results: 


                                        





                                 03/27/20 05:27 





                                 03/27/20 05:27 





                                        











  03/27/20 03/27/20





  05:27 05:27


 


WBC   7.6


 


RBC   4.17


 


Hgb   13.3


 


Hct   39.9


 


MCV   96


 


MCH   32.0


 


MCHC   33.4


 


RDW   13.7


 


Plt Count   306


 


Seg Neutrophils %   55.9


 


Sodium  135.1 L 


 


Potassium  4.2 


 


Chloride  96 L 


 


Carbon Dioxide  32 H 


 


Anion Gap  7 


 


BUN  11 


 


Creatinine  0.76 


 


Est GFR (African Amer)  > 60 


 


Glucose  141 H 


 


Calcium  9.1 


 


Magnesium  1.8 








                                        





03/21/20 21:42   Blood   Blood Culture - Final


                            NO GROWTH IN 5 DAYS





                                        











  03/21/20 03/21/20 03/21/20





  21:18 21:18 22:48


 


Creatine Kinase  Cancelled   120


 


CK-MB (CK-2)   1.61 


 


Troponin I   < 0.012 


 


NT-Pro-B Natriuret Pep   














  03/21/20 03/22/20 03/22/20





  22:48 00:35 04:31


 


Creatine Kinase   


 


CK-MB (CK-2)   


 


Troponin I   < 0.012  < 0.012


 


NT-Pro-B Natriuret Pep  831 H  














  03/22/20 03/22/20 03/22/20





  08:23 12:30 16:00


 


Creatine Kinase   


 


CK-MB (CK-2)   


 


Troponin I  < 0.012  < 0.012  < 0.012


 


NT-Pro-B Natriuret Pep   














  03/22/20





  20:20


 


Creatine Kinase 


 


CK-MB (CK-2) 


 


Troponin I  < 0.012


 


NT-Pro-B Natriuret Pep 











Impressions: 


                                        





Chest X-Ray  03/21/20 21:15


IMPRESSION:


Mild patchy basilar opacities are similar in appearance to prior


study. Clinical correlation is advised. The possibility of


infection is not excluded.


 








Chest/Abdomen CTA  03/21/20 22:09


IMPRESSION: 


 


No aortic dissection or aneurysm. No pulmonary embolus. No


definite pneumonia.


 














Assessment and Plan





- Diagnosis


(1) Acute on chronic diastolic heart failure


Is this a current diagnosis for this admission?: Yes   


Plan: 


She is getting oral Lasix once a day, responding well to fluid restriction.  She

got a single dose of IV Lasix yesterday and that has removed a lot of the excess

fluid.





3/26/2020-on examination patient is euvolemic.  Receiving p.o. Lasix 40 mg 

daily..





3/27/2020-patient has history of chronic diastolic heart failure not in fluid 

overload.  Plan is to continue Lasix 40 mg p.o. daily.








(2) Acute respiratory failure with hypoxia


Is this a current diagnosis for this admission?: Yes   


Plan: 


Currently stable on oxygen per nasal cannula, breathing comfortably.  She was 

also on vancomycin but I think that the edema is from heart failure and fluid 

overload and not from a pneumonia.





3/26/2020-pulse ox is 95% on 2 L.  Comfortable in the bed communicating well.  

On examination bilateral entry was decreased no wheezing no crepitations 

present.  covid testing is pending.  Flu is negative.





3/27/2020-ox is 90% on room air this morning.  Comfortable in the bed 

communicating well.  On examination chest bilateral entry was decreased no 

wheezing no crepitations.  Coronavirus testing is pending.








(3) Atrial fibrillation with RVR


Is this a current diagnosis for this admission?: Yes   


Plan: 


Rate is controlled with metoprolol and digoxin, appears like it might be a flu

tter pattern on the monitor, she is anticoagulated





3/26/2020-patient is on a aphixaban for atrial fibrillation and for rate control

she is on digoxin and metoprolol.  Heart rates in the 90s.  In sinus rhythm.





3/27/20-patient has history of chronic atrial fibrillation on apixaban.  Heart 

is controlled and presently on digoxin and metoprolol.  The heart rate this 

morning 72.








(4) Cystitis


Is this a current diagnosis for this admission?: Yes   


Plan: 


She is growing a Citrobacter out of the urine.  Currently on meropenem, will 

consider switching to an oral agent at discharge








3/26/2020-urine culture is growing Citrobacter, Streptococcus bovis group, group

B streptococcus.  Presently on meropenem.  Afebrile.  Plan is to continue the 

present management.








(5) Diabetes mellitus type 2 in obese


Is this a current diagnosis for this admission?: No   


Plan: 


We will continue a consistent carbohydrate diet and insulin sliding scale





3/26/2020-latest blood sugar is 143.  hemoGlobin A1c is 11.3.  Diet exercise 

weight loss lifestyle modifications discussed with the patient.





3/27/2020-blood sugar today 141.  Stable.  Plan is to continue insulin sliding 

scale.








(6) Morbid obesity with BMI of 40.0-44.9, adult


Is this a current diagnosis for this admission?: No   





- Plan Summary


Summary: 


Patient is admitted in a imcu/telemetry bed where she will receive routine 

supportive and symptomatic cares.  She will be treated with IV fluids and 

supplemental oxygen via nasal cannula and/or BiPAP if required to maintain 

adequate oxygen saturation.  She will also receive adequate analgesia for her 

chest pain using both nonnarcotic and narcotic treatments.  A cardiology 

consultation will be obtained if her atrial fibrillation cannot be controlled 

with conversion to oral medications.  Patient was placed on Rocephin and 

Zithromax prophylactically/empirically in the emergency room though no obvious 

source of infection was noted.  Serial lactic acids will be obtained.  Serial 

troponin I levels will also be obtained.  A hemoglobin A1c, lipid profile and 

thyroid profile will be obtained.  CBCs, metabolic profiles and magnesium levels

will be obtained as appropriate.  Patient will use morphine sulfate 2 to 4 mg IV

every 2 hours as needed for severe pain.  An effort will be made to withdraw the

patient from the diltiazem infusion as soon as possible.  Before meals and at 

bedtime Accu-Cheks to be obtained with sliding scale insulin available for 

hyperglycemia and a hypoglycemic protocol in place.  Patient be continued on her

usual medications as appropriate once her medication list has been verified and 

reconciled.  Nutritionist consultation will be obtained for dietary evaluation 

and treatment of her obesity and diabetes.

## 2020-03-28 LAB
ANION GAP SERPL CALC-SCNC: 9 MMOL/L (ref 5–19)
BUN SERPL-MCNC: 10 MG/DL (ref 7–20)
CALCIUM: 9.4 MG/DL (ref 8.4–10.2)
CHLORIDE SERPL-SCNC: 97 MMOL/L (ref 98–107)
CO2 SERPL-SCNC: 29 MMOL/L (ref 22–30)
GLUCOSE SERPL-MCNC: 143 MG/DL (ref 75–110)
POTASSIUM SERPL-SCNC: 4.3 MMOL/L (ref 3.6–5)

## 2020-03-28 RX ADMIN — METOPROLOL SUCCINATE SCH MG: 50 TABLET, EXTENDED RELEASE ORAL at 09:28

## 2020-03-28 RX ADMIN — ZOLPIDEM TARTRATE SCH MG: 5 TABLET ORAL at 21:39

## 2020-03-28 RX ADMIN — INSULIN HUMAN SCH UNIT: 100 INJECTION, SOLUTION PARENTERAL at 12:32

## 2020-03-28 RX ADMIN — FLUTICASONE FUROATE, UMECLIDINIUM BROMIDE AND VILANTEROL TRIFENATATE SCH INHALER: 100; 62.5; 25 POWDER RESPIRATORY (INHALATION) at 09:29

## 2020-03-28 RX ADMIN — Medication SCH ML: at 21:39

## 2020-03-28 RX ADMIN — TIZANIDINE SCH MG: 4 TABLET ORAL at 14:06

## 2020-03-28 RX ADMIN — APIXABAN SCH MG: 5 TABLET, FILM COATED ORAL at 09:28

## 2020-03-28 RX ADMIN — INSULIN HUMAN SCH UNIT: 100 INJECTION, SOLUTION PARENTERAL at 21:39

## 2020-03-28 RX ADMIN — SITAGLIPTIN SCH MG: 50 TABLET, FILM COATED ORAL at 09:28

## 2020-03-28 RX ADMIN — MEROPENEM SCH MLS/HR: 1 INJECTION INTRAVENOUS at 01:23

## 2020-03-28 RX ADMIN — FUROSEMIDE SCH MG: 40 TABLET ORAL at 09:28

## 2020-03-28 RX ADMIN — PANTOPRAZOLE SODIUM SCH MG: 40 TABLET, DELAYED RELEASE ORAL at 05:42

## 2020-03-28 RX ADMIN — DIGOXIN SCH MG: 0.25 TABLET ORAL at 09:28

## 2020-03-28 RX ADMIN — DOCUSATE SODIUM SCH MG: 100 CAPSULE, LIQUID FILLED ORAL at 17:06

## 2020-03-28 RX ADMIN — APIXABAN SCH MG: 5 TABLET, FILM COATED ORAL at 17:06

## 2020-03-28 RX ADMIN — GLIPIZIDE SCH MG: 5 TABLET, FILM COATED, EXTENDED RELEASE ORAL at 09:28

## 2020-03-28 RX ADMIN — DOCUSATE SODIUM SCH MG: 100 CAPSULE, LIQUID FILLED ORAL at 09:28

## 2020-03-28 RX ADMIN — TIZANIDINE SCH MG: 4 TABLET ORAL at 21:39

## 2020-03-28 RX ADMIN — NITROFURANTOIN MONOHYDRATE/MACROCRYSTALLINE SCH MG: 25; 75 CAPSULE ORAL at 17:06

## 2020-03-28 RX ADMIN — MAGNESIUM OXIDE TAB 400 MG (241.3 MG ELEMENTAL MG) SCH MG: 400 (241.3 MG) TAB at 17:06

## 2020-03-28 RX ADMIN — TOLTERODINE TARTRATE SCH MG: 1 TABLET, FILM COATED ORAL at 09:28

## 2020-03-28 RX ADMIN — Medication SCH ML: at 05:42

## 2020-03-28 RX ADMIN — METOPROLOL SUCCINATE SCH MG: 50 TABLET, EXTENDED RELEASE ORAL at 21:38

## 2020-03-28 RX ADMIN — AMITRIPTYLINE HYDROCHLORIDE SCH MG: 50 TABLET, FILM COATED ORAL at 21:39

## 2020-03-28 RX ADMIN — INSULIN HUMAN SCH UNIT: 100 INJECTION, SOLUTION PARENTERAL at 17:05

## 2020-03-28 RX ADMIN — INSULIN HUMAN SCH: 100 INJECTION, SOLUTION PARENTERAL at 08:54

## 2020-03-28 RX ADMIN — MEROPENEM SCH MLS/HR: 1 INJECTION INTRAVENOUS at 09:28

## 2020-03-28 RX ADMIN — TOLTERODINE TARTRATE SCH MG: 1 TABLET, FILM COATED ORAL at 21:39

## 2020-03-28 RX ADMIN — VENLAFAXINE HYDROCHLORIDE SCH MG: 75 CAPSULE, EXTENDED RELEASE ORAL at 09:28

## 2020-03-28 RX ADMIN — LEVOTHYROXINE SODIUM SCH MG: 50 TABLET ORAL at 05:43

## 2020-03-28 RX ADMIN — MAGNESIUM OXIDE TAB 400 MG (241.3 MG ELEMENTAL MG) SCH MG: 400 (241.3 MG) TAB at 09:28

## 2020-03-28 RX ADMIN — Medication SCH ML: at 14:07

## 2020-03-28 NOTE — PDOC PROGRESS REPORT
Subjective


Progress Note for:: 03/28/20


Subjective:: 





54 year old female who presented to the emergency room with an acute cough.  She

admits the sudden development of a severe paroxysmal cough at approximately 5 PM

on the day prior to admission.  Her cough caused her to have severe pain in her 

ribs/chest bilaterally.  Her cough was accompanied by moderate to severe dyspnea

and associated with mild swelling in her hands and feet.  She denies other 

associated or accompanying signs and symptoms.  She denies prior similar 

episodes.  She admits exposure to a person who had active influenza 72 to 96 

hours prior to her presentation.  She has not identified any aggravating or 

ameliorating factors for her cough.  In the emergency room she was found to have

an elevated white blood count at 13,900 and a elevated lactic acid of 2.5.  She 

was noted to be tachypneic, tachycardic (A. fib with RVR) and hypoxic on 

evaluation.  She was treated with IV diltiazem using a bolus and continuous 

infusion for her tachycardia without significant improvement.  He was 

subsequently given IV metoprolol also with a less than desired response.  She 

also received initial antibiotic therapy in the emergency room for a possible 

pneumonia although no pneumonia was noted on her pulmonary evaluations including

a CT scan.  A coronavirus screening was administered in the ER.  Patient was 

subsequently admitted to the hospital for further evaluation and treatment.








3/21/20-The patient is short of breath.  She feels gurgling breath sounds.  She 

looks quite uncomfortable.





3/23/20-No adverse events overnight.  Feels fatigued.  Not short of breath at 

rest but has not tried to get up and walk.  No cough.  No fevers.  Heart rate is

in the 80s in the 90s on monitor.





3/25/20-No adverse events overnight.  Good urine output yesterday.  Breathing 

feels better.  Heart rate is in a good range.





3/26/2020-patient is comfortable in the bed denies any problems.  No acute 

events in the last 24 hours.  Pulse ox is 95% 2 L.  Flu is negative.  Urine 

culture is positive for Citrobacter on meropenem.  For A. fib she is on 

metoprolol and digoxin.





3/27/2020-comfortably in the bed communicating well.  Not in distress.  

Receiving meropenem IV for Citrobacter in the urine.  Plan is to switch the 

antibiotics to p.o. from today if possible and possible discharge tomorrow.








3/28/2020-no acute events in the last 24 hours.  Patient is receiving IV 

meropenem for Citrobacter in the urine.  Afebrile.  I called pharmacy to discuss

about changing the antibiotics to p.o. version.  Pharmacy is going to call me 

back.  Patient agreed to stay until tomorrow waiting for coronavirus test 

results.


Reason For Visit: 


PAROXYSMAL AFIB WITH RVR,PAROXYSMAL COUGH,SEVERE








Physical Exam


Vital Signs: 


                                        











Temp Pulse Resp BP Pulse Ox


 


 99.1 F   68   17   132/57 H  93 


 


 03/28/20 11:18  03/28/20 11:18  03/28/20 11:18  03/28/20 11:18  03/28/20 11:18








                                 Intake & Output











 03/27/20 03/28/20 03/29/20





 06:59 06:59 06:59


 


Intake Total 1720 1336 170


 


Output Total 2875  


 


Balance -1155 1336 170


 


Weight 108.5 kg 108.8 kg 











General appearance: PRESENT: no acute distress, obese


Head exam: PRESENT: atraumatic


Eye exam: PRESENT: PERRLA


Mouth exam: PRESENT: moist, tongue midline


Teeth exam: PRESENT: poor dentation


Neck exam: ABSENT: carotid bruit, JVD, lymphadenopathy, thyromegaly


Respiratory exam: PRESENT: decreased breath sounds


Cardiovascular exam: PRESENT: RRR.  ABSENT: diastolic murmur, rubs, systolic 

murmur


GI/Abdominal exam: PRESENT: normal bowel sounds, soft.  ABSENT: distended, 

guarding, mass, organolmegaly, rebound, tenderness


Rectal exam: PRESENT: deferred


Extremities exam: PRESENT: full ROM.  ABSENT: calf tenderness, clubbing, pedal 

edema


Neurological exam: PRESENT: alert, awake, oriented to person, oriented to place,

oriented to time, oriented to situation, CN II-XII grossly intact.  ABSENT: 

motor sensory deficit


Psychiatric exam: PRESENT: appropriate affect, normal mood.  ABSENT: homicidal 

ideation, suicidal ideation





Results


Laboratory Results: 


                                        





                                 03/27/20 05:27 





                                 03/28/20 05:55 





                                        











  03/28/20





  05:55


 


Sodium  134.8 L


 


Potassium  4.3


 


Chloride  97 L


 


Carbon Dioxide  29


 


Anion Gap  9


 


BUN  10


 


Creatinine  0.79


 


Est GFR (African Amer)  > 60


 


Glucose  143 H


 


Calcium  9.4








                                        











  03/21/20 03/21/20 03/21/20





  21:18 21:18 22:48


 


Creatine Kinase  Cancelled   120


 


CK-MB (CK-2)   1.61 


 


Troponin I   < 0.012 


 


NT-Pro-B Natriuret Pep   














  03/21/20 03/22/20 03/22/20





  22:48 00:35 04:31


 


Creatine Kinase   


 


CK-MB (CK-2)   


 


Troponin I   < 0.012  < 0.012


 


NT-Pro-B Natriuret Pep  831 H  














  03/22/20 03/22/20 03/22/20





  08:23 12:30 16:00


 


Creatine Kinase   


 


CK-MB (CK-2)   


 


Troponin I  < 0.012  < 0.012  < 0.012


 


NT-Pro-B Natriuret Pep   














  03/22/20





  20:20


 


Creatine Kinase 


 


CK-MB (CK-2) 


 


Troponin I  < 0.012


 


NT-Pro-B Natriuret Pep 











Impressions: 


                                        





Chest X-Ray  03/21/20 21:15


IMPRESSION:


Mild patchy basilar opacities are similar in appearance to prior


study. Clinical correlation is advised. The possibility of


infection is not excluded.


 








Chest/Abdomen CTA  03/21/20 22:09


IMPRESSION: 


 


No aortic dissection or aneurysm. No pulmonary embolus. No


definite pneumonia.


 














Assessment and Plan





- Diagnosis


(1) Acute on chronic diastolic heart failure


Is this a current diagnosis for this admission?: Yes   


Plan: 


She is getting oral Lasix once a day, responding well to fluid restriction.  She

got a single dose of IV Lasix yesterday and that has removed a lot of the excess

fluid.





3/26/2020-on examination patient is euvolemic.  Receiving p.o. Lasix 40 mg 

daily..





3/27/2020-patient has history of chronic diastolic heart failure not in fluid 

overload.  Plan is to continue Lasix 40 mg p.o. daily.





3/28/2020-patient has history of acute on chronic heart failure, diastolic heart

failure plan is to continue Lasix 40 mg p.o. daily.3








(2) Acute respiratory failure with hypoxia


Is this a current diagnosis for this admission?: Yes   


Plan: 


Currently stable on oxygen per nasal cannula, breathing comfortably.  She was 

also on vancomycin but I think that the edema is from heart failure and fluid 

overload and not from a pneumonia.





3/26/2020-pulse ox is 95% on 2 L.  Comfortable in the bed communicating well.  

On examination bilateral entry was decreased no wheezing no crepitations pr

esent.  covid testing is pending.  Flu is negative.





3/27/2020-ox is 90% on room air this morning.  Comfortable in the bed 

communicating well.  On examination chest bilateral entry was decreased no wh

eezing no crepitations.  Coronavirus testing is pending.








3/28/20-pulse ox is 92 % on 1 1/2 lt oxygen .. not in distress








(3) Atrial fibrillation with RVR


Is this a current diagnosis for this admission?: Yes   


Plan: 


Rate is controlled with metoprolol and digoxin, appears like it might be a 

flutter pattern on the monitor, she is anticoagulated





3/26/2020-patient is on a aphixaban for atrial fibrillation and for rate control

she is on digoxin and metoprolol.  Heart rates in the 90s.  In sinus rhythm.





3/27/20-patient has history of chronic atrial fibrillation on apixaban.  Heart 

is controlled and presently on digoxin and metoprolol.  The heart rate this 

morning 72.





3/28/2020-patient has history of chronic atrial fibrillation on aphiban.  For 

rate control she is on digoxin and metoprolol.  The heart rate today is 66 in 

sinus rhythm.








(4) Cystitis


Is this a current diagnosis for this admission?: Yes   


Plan: 


She is growing a Citrobacter out of the urine.  Currently on meropenem, will 

consider switching to an oral agent at discharge








3/26/2020-urine culture is growing Citrobacter, Streptococcus bovis group, group

B streptococcus.  Presently on meropenem.  Afebrile.  Plan is to continue the 

present management.





3/28/2020-urine culture is positive for Citrobacter on meropenem I spoke to the 

pharmacy the recommendation is to change the antibiotic Cipro 250 twice daily.








(5) Diabetes mellitus type 2 in obese


Is this a current diagnosis for this admission?: No   





(6) Morbid obesity with BMI of 40.0-44.9, adult


Is this a current diagnosis for this admission?: No   





- Plan Summary


Summary: 


Patient is admitted in a imcu/telemetry bed where she will receive routine 

supportive and symptomatic cares.  She will be treated with IV fluids and 

supplemental oxygen via nasal cannula and/or BiPAP if required to maintain 

adequate oxygen saturation.  She will also receive adequate analgesia for her 

chest pain using both nonnarcotic and narcotic treatments.  A cardiology 

consultation will be obtained if her atrial fibrillation cannot be controlled 

with conversion to oral medications.  Patient was placed on Rocephin and 

Zithromax prophylactically/empirically in the emergency room though no obvious 

source of infection was noted.  Serial lactic acids will be obtained.  Serial 

troponin I levels will also be obtained.  A hemoglobin A1c, lipid profile and 

thyroid profile will be obtained.  CBCs, metabolic profiles and magnesium levels

will be obtained as appropriate.  Patient will use morphine sulfate 2 to 4 mg IV

every 2 hours as needed for severe pain.  An effort will be made to withdraw the

patient from the diltiazem infusion as soon as possible.  Before meals and at 

bedtime Accu-Cheks to be obtained with sliding scale insulin available for 

hyperglycemia and a hypoglycemic protocol in place.  Patient be continued on her

usual medications as appropriate once her medication list has been verified and 

reconciled.  Nutritionist consultation will be obtained for dietary evaluation 

and treatment of her obesity and diabetes.

## 2020-03-29 LAB
ADD MANUAL DIFF: NO
ALBUMIN SERPL-MCNC: 3.7 G/DL (ref 3.5–5)
ALP SERPL-CCNC: 136 U/L (ref 38–126)
ANION GAP SERPL CALC-SCNC: 9 MMOL/L (ref 5–19)
AST SERPL-CCNC: 38 U/L (ref 14–36)
BASOPHILS # BLD AUTO: 0.1 10^3/UL (ref 0–0.2)
BASOPHILS NFR BLD AUTO: 1.1 % (ref 0–2)
BILIRUB DIRECT SERPL-MCNC: 0 MG/DL (ref 0–0.4)
BILIRUB SERPL-MCNC: 0.7 MG/DL (ref 0.2–1.3)
BUN SERPL-MCNC: 10 MG/DL (ref 7–20)
CALCIUM: 9.3 MG/DL (ref 8.4–10.2)
CHLORIDE SERPL-SCNC: 96 MMOL/L (ref 98–107)
CO2 SERPL-SCNC: 29 MMOL/L (ref 22–30)
EOSINOPHIL # BLD AUTO: 0.2 10^3/UL (ref 0–0.6)
EOSINOPHIL NFR BLD AUTO: 2.3 % (ref 0–6)
ERYTHROCYTE [DISTWIDTH] IN BLOOD BY AUTOMATED COUNT: 13.8 % (ref 11.5–14)
GLUCOSE SERPL-MCNC: 137 MG/DL (ref 75–110)
HCT VFR BLD CALC: 40.9 % (ref 36–47)
HGB BLD-MCNC: 13.9 G/DL (ref 12–15.5)
LYMPHOCYTES # BLD AUTO: 2.7 10^3/UL (ref 0.5–4.7)
LYMPHOCYTES NFR BLD AUTO: 31.2 % (ref 13–45)
MCH RBC QN AUTO: 32.3 PG (ref 27–33.4)
MCHC RBC AUTO-ENTMCNC: 33.9 G/DL (ref 32–36)
MCV RBC AUTO: 95 FL (ref 80–97)
MONOCYTES # BLD AUTO: 0.5 10^3/UL (ref 0.1–1.4)
MONOCYTES NFR BLD AUTO: 5.6 % (ref 3–13)
NEUTROPHILS # BLD AUTO: 5.3 10^3/UL (ref 1.7–8.2)
NEUTS SEG NFR BLD AUTO: 59.8 % (ref 42–78)
PLATELET # BLD: 335 10^3/UL (ref 150–450)
POTASSIUM SERPL-SCNC: 4.2 MMOL/L (ref 3.6–5)
PROT SERPL-MCNC: 6.4 G/DL (ref 6.3–8.2)
RBC # BLD AUTO: 4.29 10^6/UL (ref 3.72–5.28)
TOTAL CELLS COUNTED % (AUTO): 100 %
WBC # BLD AUTO: 8.8 10^3/UL (ref 4–10.5)

## 2020-03-29 RX ADMIN — GLIPIZIDE SCH MG: 5 TABLET, FILM COATED, EXTENDED RELEASE ORAL at 07:59

## 2020-03-29 RX ADMIN — NITROFURANTOIN MONOHYDRATE/MACROCRYSTALLINE SCH MG: 25; 75 CAPSULE ORAL at 09:23

## 2020-03-29 RX ADMIN — MAGNESIUM OXIDE TAB 400 MG (241.3 MG ELEMENTAL MG) SCH MG: 400 (241.3 MG) TAB at 09:22

## 2020-03-29 RX ADMIN — APIXABAN SCH MG: 5 TABLET, FILM COATED ORAL at 17:01

## 2020-03-29 RX ADMIN — TOLTERODINE TARTRATE SCH MG: 1 TABLET, FILM COATED ORAL at 09:22

## 2020-03-29 RX ADMIN — VENLAFAXINE HYDROCHLORIDE SCH MG: 75 CAPSULE, EXTENDED RELEASE ORAL at 09:22

## 2020-03-29 RX ADMIN — NITROFURANTOIN MONOHYDRATE/MACROCRYSTALLINE SCH MG: 25; 75 CAPSULE ORAL at 17:00

## 2020-03-29 RX ADMIN — INSULIN HUMAN SCH: 100 INJECTION, SOLUTION PARENTERAL at 21:22

## 2020-03-29 RX ADMIN — PANTOPRAZOLE SODIUM SCH MG: 40 TABLET, DELAYED RELEASE ORAL at 05:54

## 2020-03-29 RX ADMIN — DOCUSATE SODIUM SCH MG: 100 CAPSULE, LIQUID FILLED ORAL at 09:22

## 2020-03-29 RX ADMIN — METOPROLOL SUCCINATE SCH MG: 50 TABLET, EXTENDED RELEASE ORAL at 21:22

## 2020-03-29 RX ADMIN — FUROSEMIDE SCH MG: 40 TABLET ORAL at 09:23

## 2020-03-29 RX ADMIN — AMITRIPTYLINE HYDROCHLORIDE SCH MG: 50 TABLET, FILM COATED ORAL at 21:21

## 2020-03-29 RX ADMIN — APIXABAN SCH MG: 5 TABLET, FILM COATED ORAL at 09:23

## 2020-03-29 RX ADMIN — INSULIN HUMAN SCH: 100 INJECTION, SOLUTION PARENTERAL at 16:41

## 2020-03-29 RX ADMIN — MAGNESIUM OXIDE TAB 400 MG (241.3 MG ELEMENTAL MG) SCH MG: 400 (241.3 MG) TAB at 17:00

## 2020-03-29 RX ADMIN — INSULIN HUMAN SCH UNIT: 100 INJECTION, SOLUTION PARENTERAL at 11:05

## 2020-03-29 RX ADMIN — TIZANIDINE SCH MG: 4 TABLET ORAL at 13:43

## 2020-03-29 RX ADMIN — TIZANIDINE SCH MG: 4 TABLET ORAL at 21:22

## 2020-03-29 RX ADMIN — ZOLPIDEM TARTRATE SCH MG: 5 TABLET ORAL at 22:05

## 2020-03-29 RX ADMIN — Medication SCH ML: at 13:43

## 2020-03-29 RX ADMIN — INSULIN HUMAN SCH UNIT: 100 INJECTION, SOLUTION PARENTERAL at 07:59

## 2020-03-29 RX ADMIN — LEVOTHYROXINE SODIUM SCH MG: 50 TABLET ORAL at 05:54

## 2020-03-29 RX ADMIN — METOPROLOL SUCCINATE SCH MG: 50 TABLET, EXTENDED RELEASE ORAL at 09:23

## 2020-03-29 RX ADMIN — DOCUSATE SODIUM SCH MG: 100 CAPSULE, LIQUID FILLED ORAL at 17:00

## 2020-03-29 RX ADMIN — FLUTICASONE FUROATE, UMECLIDINIUM BROMIDE AND VILANTEROL TRIFENATATE SCH INHALER: 100; 62.5; 25 POWDER RESPIRATORY (INHALATION) at 09:23

## 2020-03-29 RX ADMIN — Medication SCH ML: at 05:54

## 2020-03-29 RX ADMIN — TOLTERODINE TARTRATE SCH MG: 1 TABLET, FILM COATED ORAL at 21:22

## 2020-03-29 RX ADMIN — TIZANIDINE SCH MG: 4 TABLET ORAL at 05:54

## 2020-03-29 RX ADMIN — Medication SCH ML: at 21:22

## 2020-03-29 RX ADMIN — SITAGLIPTIN SCH MG: 50 TABLET, FILM COATED ORAL at 09:22

## 2020-03-29 RX ADMIN — DIGOXIN SCH MG: 0.25 TABLET ORAL at 09:22

## 2020-03-29 RX ADMIN — ALPRAZOLAM PRN MG: 0.5 TABLET ORAL at 23:16

## 2020-03-29 NOTE — PDOC PROGRESS REPORT
Subjective


Progress Note for:: 03/29/20


Subjective:: 





54 year old female who presented to the emergency room with an acute cough.  She

admits the sudden development of a severe paroxysmal cough at approximately 5 PM

on the day prior to admission.  Her cough caused her to have severe pain in her 

ribs/chest bilaterally.  Her cough was accompanied by moderate to severe dyspnea

and associated with mild swelling in her hands and feet.  She denies other 

associated or accompanying signs and symptoms.  She denies prior similar 

episodes.  She admits exposure to a person who had active influenza 72 to 96 

hours prior to her presentation.  She has not identified any aggravating or 

ameliorating factors for her cough.  In the emergency room she was found to have

an elevated white blood count at 13,900 and a elevated lactic acid of 2.5.  She 

was noted to be tachypneic, tachycardic (A. fib with RVR) and hypoxic on 

evaluation.  She was treated with IV diltiazem using a bolus and continuous 

infusion for her tachycardia without significant improvement.  He was 

subsequently given IV metoprolol also with a less than desired response.  She 

also received initial antibiotic therapy in the emergency room for a possible 

pneumonia although no pneumonia was noted on her pulmonary evaluations including

a CT scan.  A coronavirus screening was administered in the ER.  Patient was 

subsequently admitted to the hospital for further evaluation and treatment.








3/21/20-The patient is short of breath.  She feels gurgling breath sounds.  She 

looks quite uncomfortable.





3/23/20-No adverse events overnight.  Feels fatigued.  Not short of breath at 

rest but has not tried to get up and walk.  No cough.  No fevers.  Heart rate is

in the 80s in the 90s on monitor.





3/25/20-No adverse events overnight.  Good urine output yesterday.  Breathing 

feels better.  Heart rate is in a good range.





3/26/2020-patient is comfortable in the bed denies any problems.  No acute 

events in the last 24 hours.  Pulse ox is 95% 2 L.  Flu is negative.  Urine 

culture is positive for Citrobacter on meropenem.  For A. fib she is on 

metoprolol and digoxin.





3/27/2020-comfortably in the bed communicating well.  Not in distress.  

Receiving meropenem IV for Citrobacter in the urine.  Plan is to switch the 

antibiotics to p.o. from today if possible and possible discharge tomorrow.








3/28/2020-no acute events in the last 24 hours.  Patient is receiving IV 

meropenem for Citrobacter in the urine.  Afebrile.  I called pharmacy to discuss

about changing the antibiotics to p.o. version.  Pharmacy is going to call me 

back.  Patient agreed to stay until tomorrow waiting for coronavirus test 

results.





3/29/20-no acute events in the last 24 hours.  Patient become hypoxic this 

morning with oxygen supplementation pulse ox is back to 98%.  Denies any chest 

pains denies any palpitations.  Covid testing is still pending.


Reason For Visit: 


PAROXYSMAL AFIB WITH RVR,PAROXYSMAL COUGH,SEVERE








Physical Exam


Vital Signs: 


                                        











Temp Pulse Resp BP Pulse Ox


 


 98.6 F   60   15   131/46 H  95 


 


 03/29/20 07:31  03/29/20 07:31  03/29/20 07:31  03/29/20 07:31  03/29/20 07:31








                                 Intake & Output











 03/28/20 03/29/20 03/30/20





 06:59 06:59 06:59


 


Intake Total 1336 1570 


 


Balance 1336 1570 


 


Weight 108.8 kg 104.9 kg 











General appearance: PRESENT: no acute distress, obese


Head exam: PRESENT: atraumatic


Eye exam: PRESENT: PERRLA


Mouth exam: PRESENT: neck supple


Teeth exam: PRESENT: poor dentation


Neck exam: ABSENT: carotid bruit, JVD, lymphadenopathy, thyromegaly


Respiratory exam: PRESENT: decreased breath sounds


Pulses: PRESENT: normal dorsalis pedis pul


GI/Abdominal exam: PRESENT: normal bowel sounds, soft.  ABSENT: distended, 

guarding, mass, organolmegaly, rebound, tenderness


Rectal exam: PRESENT: deferred


Extremities exam: PRESENT: full ROM.  ABSENT: calf tenderness, clubbing, pedal 

edema


Neurological exam: PRESENT: alert, awake, oriented to person, oriented to place,

oriented to time, oriented to situation, CN II-XII grossly intact.  ABSENT: 

motor sensory deficit


Psychiatric exam: PRESENT: appropriate affect, normal mood.  ABSENT: homicidal 

ideation, suicidal ideation





Results


Laboratory Results: 


                                        





                                 03/29/20 06:04 





                                 03/29/20 06:04 





                                        











  03/29/20 03/29/20





  06:04 06:04


 


WBC  8.8 


 


RBC  4.29 


 


Hgb  13.9 


 


Hct  40.9 


 


MCV  95 


 


MCH  32.3 


 


MCHC  33.9 


 


RDW  13.8 


 


Plt Count  335 


 


Seg Neutrophils %  59.8 


 


Sodium   134.2 L


 


Potassium   4.2


 


Chloride   96 L


 


Carbon Dioxide   29


 


Anion Gap   9


 


BUN   10


 


Creatinine   0.83


 


Est GFR (African Amer)   > 60


 


Glucose   137 H


 


Calcium   9.3


 


Magnesium   1.9


 


Total Bilirubin   0.7


 


AST   38 H


 


Alkaline Phosphatase   136 H


 


Total Protein   6.4


 


Albumin   3.7








                                        











  03/21/20 03/21/20 03/21/20





  21:18 21:18 22:48


 


Creatine Kinase  Cancelled   120


 


CK-MB (CK-2)   1.61 


 


Troponin I   < 0.012 


 


NT-Pro-B Natriuret Pep   














  03/21/20 03/22/20 03/22/20





  22:48 00:35 04:31


 


Creatine Kinase   


 


CK-MB (CK-2)   


 


Troponin I   < 0.012  < 0.012


 


NT-Pro-B Natriuret Pep  831 H  














  03/22/20 03/22/20 03/22/20





  08:23 12:30 16:00


 


Creatine Kinase   


 


CK-MB (CK-2)   


 


Troponin I  < 0.012  < 0.012  < 0.012


 


NT-Pro-B Natriuret Pep   














  03/22/20





  20:20


 


Creatine Kinase 


 


CK-MB (CK-2) 


 


Troponin I  < 0.012


 


NT-Pro-B Natriuret Pep 











Impressions: 


                                        





Chest X-Ray  03/21/20 21:15


IMPRESSION:


Mild patchy basilar opacities are similar in appearance to prior


study. Clinical correlation is advised. The possibility of


infection is not excluded.


 








Chest/Abdomen CTA  03/21/20 22:09


IMPRESSION: 


 


No aortic dissection or aneurysm. No pulmonary embolus. No


definite pneumonia.


 














Assessment and Plan





- Diagnosis


(1) Acute on chronic diastolic heart failure


Is this a current diagnosis for this admission?: Yes   


Plan: 


She is getting oral Lasix once a day, responding well to fluid restriction.  She

got a single dose of IV Lasix yesterday and that has removed a lot of the excess

fluid.





3/26/2020-on examination patient is euvolemic.  Receiving p.o. Lasix 40 mg 

daily..





3/27/2020-patient has history of chronic diastolic heart failure not in fluid 

overload.  Plan is to continue Lasix 40 mg p.o. daily.





3/28/2020-patient has history of acute on chronic heart failure, diastolic heart

failure plan is to continue Lasix 40 mg p.o. daily.3





3/29/2020-patient is on Lasix 40 mg p.o. daily euvolemic.  Patient has history 

of chronic diastolic heart failure.








(2) Acute respiratory failure with hypoxia


Is this a current diagnosis for this admission?: Yes   


Plan: 


Currently stable on oxygen per nasal cannula, breathing comfortably.  She was 

also on vancomycin but I think that the edema is from heart failure and fluid 

overload and not from a pneumonia.





3/26/2020-pulse ox is 95% on 2 L.  Comfortable in the bed communicating well.  

On examination bilateral entry was decreased no wheezing no crepitations 

present.  covid testing is pending.  Flu is negative.





3/27/2020-ox is 90% on room air this morning.  Comfortable in the bed 

communicating well.  On examination chest bilateral entry was decreased no 

wheezing no crepitations.  Coronavirus testing is pending.








3/28/20-pulse ox is 92 % on 1 1/2 lt oxygen .. not in distress





3/29/2020-pulse ox is 98% on 2 L today.  Stable.  No complaints of shortness of 

breath no wheezing.  covid testing is pending.








(3) Atrial fibrillation with RVR


Is this a current diagnosis for this admission?: Yes   


Plan: 


Rate is controlled with metoprolol and digoxin, appears like it might be a 

flutter pattern on the monitor, she is anticoagulated





3/26/2020-patient is on a aphixaban for atrial fibrillation and for rate control

she is on digoxin and metoprolol.  Heart rates in the 90s.  In sinus rhythm.





3/27/20-patient has history of chronic atrial fibrillation on apixaban.  Heart 

is controlled and presently on digoxin and metoprolol.  The heart rate this 

morning 72.





3/28/2020-patient has history of chronic atrial fibrillation on apixaban.  For 

rate control she is on digoxin and metoprolol.  The heart rate today is 66 in 

sinus rhythm.





3/29/2020 heart rate today is 60.  Patient is on aphixaban.  Plan is to continue

digoxin and metoprolol.








(4) Cystitis


Is this a current diagnosis for this admission?: Yes   


Plan: 


She is growing a Citrobacter out of the urine.  Currently on meropenem, will 

consider switching to an oral agent at discharge








3/26/2020-urine culture is growing Citrobacter, Streptococcus bovis group, group

B streptococcus.  Presently on meropenem.  Afebrile.  Plan is to continue the 

present management.





3/28/2020-urine culture is positive for Citrobacter on meropenem I spoke to the 

pharmacy the recommendation is to change the antibiotic Cipro 250 twice daily.





3/29/2020-urine culture is positive for Citrobacter and Cipro floxacillin.  

Afebrile.  Plan is to continue the present management.








(5) Diabetes mellitus type 2 in obese


Is this a current diagnosis for this admission?: No   





(6) Morbid obesity with BMI of 40.0-44.9, adult


Is this a current diagnosis for this admission?: No   





- Plan Summary


Summary: 


Patient is admitted in a imcu/telemetry bed where she will receive routine 

supportive and symptomatic cares.  She will be treated with IV fluids and 

supplemental oxygen via nasal cannula and/or BiPAP if required to maintain a

dequate oxygen saturation.  She will also receive adequate analgesia for her 

chest pain using both nonnarcotic and narcotic treatments.  A cardiology 

consultation will be obtained if her atrial fibrillation cannot be controlled 

with conversion to oral medications.  Patient was placed on Rocephin and 

Zithromax prophylactically/empirically in the emergency room though no obvious 

source of infection was noted.  Serial lactic acids will be obtained.  Serial 

troponin I levels will also be obtained.  A hemoglobin A1c, lipid profile and 

thyroid profile will be obtained.  CBCs, metabolic profiles and magnesium levels

will be obtained as appropriate.  Patient will use morphine sulfate 2 to 4 mg IV

every 2 hours as needed for severe pain.  An effort will be made to withdraw the

patient from the diltiazem infusion as soon as possible.  Before meals and at 

bedtime Accu-Cheks to be obtained with sliding scale insulin available for 

hyperglycemia and a hypoglycemic protocol in place.  Patient be continued on her

usual medications as appropriate once her medication list has been verified and 

reconciled.  Nutritionist consultation will be obtained for dietary evaluation 

and treatment of her obesity and diabetes.

## 2020-03-30 LAB
ADD MANUAL DIFF: NO
ALBUMIN SERPL-MCNC: 3.9 G/DL (ref 3.5–5)
ALP SERPL-CCNC: 142 U/L (ref 38–126)
ANION GAP SERPL CALC-SCNC: 9 MMOL/L (ref 5–19)
AST SERPL-CCNC: 41 U/L (ref 14–36)
BASOPHILS # BLD AUTO: 0.1 10^3/UL (ref 0–0.2)
BASOPHILS NFR BLD AUTO: 0.9 % (ref 0–2)
BILIRUB DIRECT SERPL-MCNC: 0 MG/DL (ref 0–0.4)
BILIRUB SERPL-MCNC: 0.9 MG/DL (ref 0.2–1.3)
BUN SERPL-MCNC: 12 MG/DL (ref 7–20)
CALCIUM: 9.4 MG/DL (ref 8.4–10.2)
CHLORIDE SERPL-SCNC: 96 MMOL/L (ref 98–107)
CO2 SERPL-SCNC: 29 MMOL/L (ref 22–30)
EOSINOPHIL # BLD AUTO: 0.2 10^3/UL (ref 0–0.6)
EOSINOPHIL NFR BLD AUTO: 2.3 % (ref 0–6)
ERYTHROCYTE [DISTWIDTH] IN BLOOD BY AUTOMATED COUNT: 13.5 % (ref 11.5–14)
GLUCOSE SERPL-MCNC: 142 MG/DL (ref 75–110)
HCT VFR BLD CALC: 42.8 % (ref 36–47)
HGB BLD-MCNC: 14.4 G/DL (ref 12–15.5)
LYMPHOCYTES # BLD AUTO: 2.8 10^3/UL (ref 0.5–4.7)
LYMPHOCYTES NFR BLD AUTO: 31.6 % (ref 13–45)
MCH RBC QN AUTO: 32.1 PG (ref 27–33.4)
MCHC RBC AUTO-ENTMCNC: 33.6 G/DL (ref 32–36)
MCV RBC AUTO: 96 FL (ref 80–97)
MONOCYTES # BLD AUTO: 0.6 10^3/UL (ref 0.1–1.4)
MONOCYTES NFR BLD AUTO: 7.2 % (ref 3–13)
NEUTROPHILS # BLD AUTO: 5.1 10^3/UL (ref 1.7–8.2)
NEUTS SEG NFR BLD AUTO: 58 % (ref 42–78)
PLATELET # BLD: 337 10^3/UL (ref 150–450)
POTASSIUM SERPL-SCNC: 4.2 MMOL/L (ref 3.6–5)
PROT SERPL-MCNC: 6.6 G/DL (ref 6.3–8.2)
RBC # BLD AUTO: 4.48 10^6/UL (ref 3.72–5.28)
TOTAL CELLS COUNTED % (AUTO): 100 %
WBC # BLD AUTO: 8.8 10^3/UL (ref 4–10.5)

## 2020-03-30 RX ADMIN — NITROFURANTOIN MONOHYDRATE/MACROCRYSTALLINE SCH MG: 25; 75 CAPSULE ORAL at 18:02

## 2020-03-30 RX ADMIN — TIZANIDINE SCH MG: 4 TABLET ORAL at 05:25

## 2020-03-30 RX ADMIN — INSULIN HUMAN SCH: 100 INJECTION, SOLUTION PARENTERAL at 21:14

## 2020-03-30 RX ADMIN — DOCUSATE SODIUM SCH: 100 CAPSULE, LIQUID FILLED ORAL at 18:00

## 2020-03-30 RX ADMIN — Medication SCH ML: at 22:02

## 2020-03-30 RX ADMIN — DOCUSATE SODIUM SCH: 100 CAPSULE, LIQUID FILLED ORAL at 09:51

## 2020-03-30 RX ADMIN — INSULIN HUMAN SCH UNIT: 100 INJECTION, SOLUTION PARENTERAL at 17:32

## 2020-03-30 RX ADMIN — MAGNESIUM OXIDE TAB 400 MG (241.3 MG ELEMENTAL MG) SCH MG: 400 (241.3 MG) TAB at 17:33

## 2020-03-30 RX ADMIN — Medication SCH: at 07:06

## 2020-03-30 RX ADMIN — MAGNESIUM OXIDE TAB 400 MG (241.3 MG ELEMENTAL MG) SCH MG: 400 (241.3 MG) TAB at 09:56

## 2020-03-30 RX ADMIN — INSULIN HUMAN SCH UNIT: 100 INJECTION, SOLUTION PARENTERAL at 09:51

## 2020-03-30 RX ADMIN — TIZANIDINE SCH MG: 4 TABLET ORAL at 22:02

## 2020-03-30 RX ADMIN — PANTOPRAZOLE SODIUM SCH MG: 40 TABLET, DELAYED RELEASE ORAL at 05:25

## 2020-03-30 RX ADMIN — NITROFURANTOIN MONOHYDRATE/MACROCRYSTALLINE SCH MG: 25; 75 CAPSULE ORAL at 09:52

## 2020-03-30 RX ADMIN — GLIPIZIDE SCH MG: 5 TABLET, FILM COATED, EXTENDED RELEASE ORAL at 09:50

## 2020-03-30 RX ADMIN — FUROSEMIDE SCH MG: 40 TABLET ORAL at 09:51

## 2020-03-30 RX ADMIN — ALPRAZOLAM PRN MG: 0.5 TABLET ORAL at 22:02

## 2020-03-30 RX ADMIN — INSULIN HUMAN SCH: 100 INJECTION, SOLUTION PARENTERAL at 12:35

## 2020-03-30 RX ADMIN — METOPROLOL SUCCINATE SCH MG: 50 TABLET, EXTENDED RELEASE ORAL at 22:03

## 2020-03-30 RX ADMIN — ZOLPIDEM TARTRATE SCH MG: 5 TABLET ORAL at 22:03

## 2020-03-30 RX ADMIN — TOLTERODINE TARTRATE SCH MG: 1 TABLET, FILM COATED ORAL at 22:02

## 2020-03-30 RX ADMIN — VENLAFAXINE HYDROCHLORIDE SCH MG: 75 CAPSULE, EXTENDED RELEASE ORAL at 09:57

## 2020-03-30 RX ADMIN — TOLTERODINE TARTRATE SCH MG: 1 TABLET, FILM COATED ORAL at 09:59

## 2020-03-30 RX ADMIN — TIZANIDINE SCH MG: 4 TABLET ORAL at 13:23

## 2020-03-30 RX ADMIN — SITAGLIPTIN SCH MG: 50 TABLET, FILM COATED ORAL at 09:57

## 2020-03-30 RX ADMIN — AMITRIPTYLINE HYDROCHLORIDE SCH MG: 50 TABLET, FILM COATED ORAL at 22:03

## 2020-03-30 RX ADMIN — LEVOTHYROXINE SODIUM SCH MG: 50 TABLET ORAL at 05:25

## 2020-03-30 RX ADMIN — FLUTICASONE FUROATE, UMECLIDINIUM BROMIDE AND VILANTEROL TRIFENATATE SCH INHALER: 100; 62.5; 25 POWDER RESPIRATORY (INHALATION) at 13:22

## 2020-03-30 RX ADMIN — APIXABAN SCH MG: 5 TABLET, FILM COATED ORAL at 09:52

## 2020-03-30 RX ADMIN — Medication SCH ML: at 13:22

## 2020-03-30 RX ADMIN — METOPROLOL SUCCINATE SCH MG: 50 TABLET, EXTENDED RELEASE ORAL at 09:56

## 2020-03-30 RX ADMIN — DIGOXIN SCH MG: 0.25 TABLET ORAL at 09:47

## 2020-03-30 RX ADMIN — APIXABAN SCH MG: 5 TABLET, FILM COATED ORAL at 17:33

## 2020-03-30 NOTE — PDOC PROGRESS REPORT
Subjective


Progress Note for:: 03/30/20


Subjective:: 





54 year old female who presented to the emergency room with an acute cough.  She

admits the sudden development of a severe paroxysmal cough at approximately 5 PM

on the day prior to admission.  Her cough caused her to have severe pain in her 

ribs/chest bilaterally.  Her cough was accompanied by moderate to severe dyspnea

and associated with mild swelling in her hands and feet.  She denies other 

associated or accompanying signs and symptoms.  She denies prior similar 

episodes.  She admits exposure to a person who had active influenza 72 to 96 

hours prior to her presentation.  She has not identified any aggravating or 

ameliorating factors for her cough.  In the emergency room she was found to have

an elevated white blood count at 13,900 and a elevated lactic acid of 2.5.  She 

was noted to be tachypneic, tachycardic (A. fib with RVR) and hypoxic on 

evaluation.  She was treated with IV diltiazem using a bolus and continuous 

infusion for her tachycardia without significant improvement.  He was 

subsequently given IV metoprolol also with a less than desired response.  She 

also received initial antibiotic therapy in the emergency room for a possible 

pneumonia although no pneumonia was noted on her pulmonary evaluations including

a CT scan.  A coronavirus screening was administered in the ER.  Patient was 

subsequently admitted to the hospital for further evaluation and treatment.








3/21/20-The patient is short of breath.  She feels gurgling breath sounds.  She 

looks quite uncomfortable.





3/23/20-No adverse events overnight.  Feels fatigued.  Not short of breath at 

rest but has not tried to get up and walk.  No cough.  No fevers.  Heart rate is

in the 80s in the 90s on monitor.





3/25/20-No adverse events overnight.  Good urine output yesterday.  Breathing 

feels better.  Heart rate is in a good range.





3/26/2020-patient is comfortable in the bed denies any problems.  No acute 

events in the last 24 hours.  Pulse ox is 95% 2 L.  Flu is negative.  Urine 

culture is positive for Citrobacter on meropenem.  For A. fib she is on 

metoprolol and digoxin.





3/27/2020-comfortably in the bed communicating well.  Not in distress.  

Receiving meropenem IV for Citrobacter in the urine.  Plan is to switch the 

antibiotics to p.o. from today if possible and possible discharge tomorrow.








3/28/2020-no acute events in the last 24 hours.  Patient is receiving IV 

meropenem for Citrobacter in the urine.  Afebrile.  I called pharmacy to discuss

about changing the antibiotics to p.o. version.  Pharmacy is going to call me 

back.  Patient agreed to stay until tomorrow waiting for coronavirus test 

results.





3/29/20-no acute events in the last 24 hours.  Patient become hypoxic this 

morning with oxygen supplementation pulse ox is back to 98%.  Denies any chest 

pains denies any palpitations.  Covid testing is still pending.








3/30/2020-rhinovirus test is still pending.  Hopefully we get the report today. 

No acute events in the last 24 hours.  Afebrile.  Plan is to continue the 

present management.


Reason For Visit: 


PAROXYSMAL AFIB WITH RVR,PAROXYSMAL COUGH,SEVERE








Physical Exam


Vital Signs: 


                                        











Temp Pulse Resp BP Pulse Ox


 


 98.6 F   59 L  15   125/55 L  90 L


 


 03/30/20 07:41  03/30/20 07:41  03/30/20 07:41  03/30/20 07:41  03/30/20 07:41








                                 Intake & Output











 03/29/20 03/30/20 03/31/20





 06:59 06:59 06:59


 


Intake Total 1570 1030 


 


Output Total  1450 


 


Balance 1570 -420 


 


Weight 104.9 kg 109.1 kg 











General appearance: PRESENT: no acute distress, obese


Head exam: PRESENT: atraumatic


Eye exam: PRESENT: PERRLA


Mouth exam: PRESENT: neck supple


Teeth exam: PRESENT: poor dentation


Neck exam: ABSENT: carotid bruit, JVD, lymphadenopathy, thyromegaly


Respiratory exam: PRESENT: decreased breath sounds


Cardiovascular exam: PRESENT: RRR.  ABSENT: diastolic murmur, rubs, systolic 

murmur


Vascular exam: PRESENT: normal capillary refill


GI/Abdominal exam: PRESENT: normal bowel sounds, soft.  ABSENT: distended, 

guarding, mass, organolmegaly, rebound, tenderness


Rectal exam: PRESENT: deferred


Extremities exam: PRESENT: full ROM.  ABSENT: calf tenderness, clubbing, pedal 

edema


Neurological exam: PRESENT: alert, awake, oriented to person, oriented to place,

oriented to time, oriented to situation, CN II-XII grossly intact.  ABSENT: 

motor sensory deficit


Psychiatric exam: PRESENT: appropriate affect, normal mood.  ABSENT: homicidal 

ideation, suicidal ideation





Results


Laboratory Results: 


                                        





                                 03/30/20 06:00 





                                 03/30/20 06:00 





                                        











  03/30/20 03/30/20





  06:00 06:00


 


WBC  8.8 


 


RBC  4.48 


 


Hgb  14.4 


 


Hct  42.8 


 


MCV  96 


 


MCH  32.1 


 


MCHC  33.6 


 


RDW  13.5 


 


Plt Count  337 


 


Seg Neutrophils %  58.0 


 


Sodium   133.7 L


 


Potassium   4.2


 


Chloride   96 L


 


Carbon Dioxide   29


 


Anion Gap   9


 


BUN   12


 


Creatinine   0.89


 


Est GFR (African Amer)   > 60


 


Glucose   142 H


 


Calcium   9.4


 


Magnesium   2.0


 


Total Bilirubin   0.9


 


AST   41 H


 


Alkaline Phosphatase   142 H


 


Total Protein   6.6


 


Albumin   3.9








                                        











  03/21/20 03/21/20 03/21/20





  21:18 21:18 22:48


 


Creatine Kinase  Cancelled   120


 


CK-MB (CK-2)   1.61 


 


Troponin I   < 0.012 


 


NT-Pro-B Natriuret Pep   














  03/21/20 03/22/20 03/22/20





  22:48 00:35 04:31


 


Creatine Kinase   


 


CK-MB (CK-2)   


 


Troponin I   < 0.012  < 0.012


 


NT-Pro-B Natriuret Pep  831 H  














  03/22/20 03/22/20 03/22/20





  08:23 12:30 16:00


 


Creatine Kinase   


 


CK-MB (CK-2)   


 


Troponin I  < 0.012  < 0.012  < 0.012


 


NT-Pro-B Natriuret Pep   














  03/22/20





  20:20


 


Creatine Kinase 


 


CK-MB (CK-2) 


 


Troponin I  < 0.012


 


NT-Pro-B Natriuret Pep 











Impressions: 


                                        





Chest X-Ray  03/21/20 21:15


IMPRESSION:


Mild patchy basilar opacities are similar in appearance to prior


study. Clinical correlation is advised. The possibility of


infection is not excluded.


 








Chest/Abdomen CTA  03/21/20 22:09


IMPRESSION: 


 


No aortic dissection or aneurysm. No pulmonary embolus. No


definite pneumonia.


 














Assessment and Plan





- Diagnosis


(1) Acute on chronic diastolic heart failure


Is this a current diagnosis for this admission?: Yes   


Plan: 


She is getting oral Lasix once a day, responding well to fluid restriction.  She

got a single dose of IV Lasix yesterday and that has removed a lot of the excess

fluid.





3/26/2020-on examination patient is euvolemic.  Receiving p.o. Lasix 40 mg 

daily..





3/27/2020-patient has history of chronic diastolic heart failure not in fluid 

overload.  Plan is to continue Lasix 40 mg p.o. daily.





3/28/2020-patient has history of acute on chronic heart failure, diastolic heart

failure plan is to continue Lasix 40 mg p.o. daily.3





3/29/2020-patient is on Lasix 40 mg p.o. daily euvolemic.  Patient has history 

of chronic diastolic heart failure.





3/30/2020-plan is to continue Lasix 40 mg p.o. daily.  Patient has history of 

chronic diastolic heart failure.  On examination today patient is euvolemic.








(2) Acute respiratory failure with hypoxia


Is this a current diagnosis for this admission?: Yes   


Plan: 


Currently stable on oxygen per nasal cannula, breathing comfortably.  She was 

also on vancomycin but I think that the edema is from heart failure and fluid 

overload and not from a pneumonia.





3/26/2020-pulse ox is 95% on 2 L.  Comfortable in the bed communicating well.  

On examination bilateral entry was decreased no wheezing no crepitations 

present.  covid testing is pending.  Flu is negative.





3/27/2020-ox is 90% on room air this morning.  Comfortable in the bed 

communicating well.  On examination chest bilateral entry was decreased no 

wheezing no crepitations.  Coronavirus testing is pending.








3/28/20-pulse ox is 92 % on 1 1/2 lt oxygen .. not in distress





3/29/2020-pulse ox is 98% on 2 L today.  Stable.  No complaints of shortness of 

breath no wheezing.  covid testing is pending.





3/30/2020-covid test done 3/20/2020-waiting for the report.  Pulse ox today's 

94% on room air.  Acute respiratory failure with hypoxia resolved.








(3) Atrial fibrillation with RVR


Is this a current diagnosis for this admission?: Yes   


Plan: 


Rate is controlled with metoprolol and digoxin, appears like it might be a 

flutter pattern on the monitor, she is anticoagulated





3/26/2020-patient is on a aphixaban for atrial fibrillation and for rate control

she is on digoxin and metoprolol.  Heart rates in the 90s.  In sinus rhythm.





3/27/20-patient has history of chronic atrial fibrillation on apixaban.  Heart 

is controlled and presently on digoxin and metoprolol.  The heart rate this 

morning 72.





3/28/2020-patient has history of chronic atrial fibrillation on apixaban.  For 

rate control she is on digoxin and metoprolol.  The heart rate today is 66 in 

sinus rhythm.





3/29/2020 heart rate today is 60.  Patient is on aphixaban.  Plan is to continue

digoxin and metoprolol.








(4) Cystitis


Is this a current diagnosis for this admission?: Yes   


Plan: 


She is growing a Citrobacter out of the urine.  Currently on meropenem, will 

consider switching to an oral agent at discharge








3/26/2020-urine culture is growing Citrobacter, Streptococcus bovis group, group

B streptococcus.  Presently on meropenem.  Afebrile.  Plan is to continue the 

present management.





3/28/2020-urine culture is positive for Citrobacter on meropenem I spoke to the 

pharmacy the recommendation is to change the antibiotic Cipro 250 twice daily.





3/29/2020-urine culture is positive for Citrobacter and Ciprofloxacillin.  Afe

brile.  Plan is to continue the present management.





3/30/2020-urine culture is positive for Citrobacter on Cipro floxacillin 

afebrile.  Plan is to continue the antibiotic therapy.








(5) Diabetes mellitus type 2 in obese


Is this a current diagnosis for this admission?: No   


Plan: 


We will continue a consistent carbohydrate diet and insulin sliding scale





3/26/2020-latest blood sugar is 143.  hemoGlobin A1c is 11.3.  Diet exercise we

ight loss lifestyle modifications discussed with the patient.





3/27/2020-blood sugar today 141.  Stable.  Plan is to continue insulin sliding 

scale.








(6) Morbid obesity with BMI of 40.0-44.9, adult


Is this a current diagnosis for this admission?: No   





- Plan Summary


Summary: 


Patient is admitted in a imcu/telemetry bed where she will receive routine 

supportive and symptomatic cares.  She will be treated with IV fluids and 

supplemental oxygen via nasal cannula and/or BiPAP if required to maintain 

adequate oxygen saturation.  She will also receive adequate analgesia for her 

chest pain using both nonnarcotic and narcotic treatments.  A cardiology 

consultation will be obtained if her atrial fibrillation cannot be controlled 

with conversion to oral medications.  Patient was placed on Rocephin and 

Zithromax prophylactically/empirically in the emergency room though no obvious 

source of infection was noted.  Serial lactic acids will be obtained.  Serial 

troponin I levels will also be obtained.  A hemoglobin A1c, lipid profile and 

thyroid profile will be obtained.  CBCs, metabolic profiles and magnesium levels

will be obtained as appropriate.  Patient will use morphine sulfate 2 to 4 mg IV

every 2 hours as needed for severe pain.  An effort will be made to withdraw the

patient from the diltiazem infusion as soon as possible.  Before meals and at 

bedtime Accu-Cheks to be obtained with sliding scale insulin available for 

hyperglycemia and a hypoglycemic protocol in place.  Patient be continued on her

usual medications as appropriate once her medication list has been verified and 

reconciled.  Nutritionist consultation will be obtained for dietary evaluation 

and treatment of her obesity and diabetes.

## 2020-03-31 VITALS — SYSTOLIC BLOOD PRESSURE: 110 MMHG | DIASTOLIC BLOOD PRESSURE: 59 MMHG

## 2020-03-31 RX ADMIN — LEVOTHYROXINE SODIUM SCH MG: 50 TABLET ORAL at 06:21

## 2020-03-31 RX ADMIN — DIGOXIN SCH MG: 0.25 TABLET ORAL at 09:10

## 2020-03-31 RX ADMIN — FUROSEMIDE SCH MG: 40 TABLET ORAL at 09:10

## 2020-03-31 RX ADMIN — FLUTICASONE FUROATE, UMECLIDINIUM BROMIDE AND VILANTEROL TRIFENATATE SCH: 100; 62.5; 25 POWDER RESPIRATORY (INHALATION) at 11:26

## 2020-03-31 RX ADMIN — INSULIN HUMAN SCH UNIT: 100 INJECTION, SOLUTION PARENTERAL at 11:56

## 2020-03-31 RX ADMIN — METOPROLOL SUCCINATE SCH MG: 50 TABLET, EXTENDED RELEASE ORAL at 09:10

## 2020-03-31 RX ADMIN — DOCUSATE SODIUM SCH: 100 CAPSULE, LIQUID FILLED ORAL at 09:00

## 2020-03-31 RX ADMIN — SITAGLIPTIN SCH MG: 50 TABLET, FILM COATED ORAL at 09:10

## 2020-03-31 RX ADMIN — Medication SCH ML: at 06:21

## 2020-03-31 RX ADMIN — TIZANIDINE SCH MG: 4 TABLET ORAL at 06:21

## 2020-03-31 RX ADMIN — PANTOPRAZOLE SODIUM SCH MG: 40 TABLET, DELAYED RELEASE ORAL at 06:21

## 2020-03-31 RX ADMIN — APIXABAN SCH MG: 5 TABLET, FILM COATED ORAL at 09:10

## 2020-03-31 RX ADMIN — INSULIN HUMAN SCH: 100 INJECTION, SOLUTION PARENTERAL at 09:00

## 2020-03-31 RX ADMIN — MAGNESIUM OXIDE TAB 400 MG (241.3 MG ELEMENTAL MG) SCH MG: 400 (241.3 MG) TAB at 09:10

## 2020-03-31 RX ADMIN — VENLAFAXINE HYDROCHLORIDE SCH MG: 75 CAPSULE, EXTENDED RELEASE ORAL at 09:10

## 2020-03-31 RX ADMIN — NITROFURANTOIN MONOHYDRATE/MACROCRYSTALLINE SCH MG: 25; 75 CAPSULE ORAL at 09:10

## 2020-03-31 RX ADMIN — GLIPIZIDE SCH MG: 5 TABLET, FILM COATED, EXTENDED RELEASE ORAL at 09:09

## 2020-03-31 RX ADMIN — TOLTERODINE TARTRATE SCH MG: 1 TABLET, FILM COATED ORAL at 09:10

## 2020-03-31 NOTE — PDOC DISCHARGE SUMMARY
Impression





- Admit/DC Date/PCP


Admission Date/Primary Care Provider: 


  03/22/20 02:20





  RIKI ALAS DO





Discharge Date: 03/31/20





- Discharge Diagnosis


(1) Acute respiratory failure with hypoxia


Is this a current diagnosis for this admission?: Yes   





(2) Acute on chronic diastolic heart failure


Is this a current diagnosis for this admission?: Yes   





(3) Atrial fibrillation with RVR


Is this a current diagnosis for this admission?: Yes   





(4) Cystitis


Is this a current diagnosis for this admission?: Yes   





(5) Diabetes mellitus type 2 in obese


Is this a current diagnosis for this admission?: No   





(6) Morbid obesity with BMI of 40.0-44.9, adult


Is this a current diagnosis for this admission?: No   





(7) Pleuritic chest pain


Is this a current diagnosis for this admission?: Yes   





(8) Hypothyroidism


Is this a current diagnosis for this admission?: Yes   





- Additional Information


Resuscitation Status: Full Code


Discharge Diet: Cardiac, Diabetic


Discharge Activity: Activity As Tolerated, Balance Activity w/Rest, Weigh Daily


Referrals: 


[facility] DEPT [Outside] (PATIENT TO BE FOLLOWED BY HEALTH DEPT. ON 

SELF QUARANTINE AT HOME. ONCE THE HEALTH DEPT. RELEASES PATIENT THEN PATIENT MAY

 SCHEDULE A FOLLOW UP APPT. WITH PCP.)


RIKI ALAS DO [Primary Care Provider] - 


Prescriptions: 


Furosemide [Lasix 40 mg Tablet] 40 mg PO DAILY #30 tablet


Magnesium Oxide [Mag-Ox 400 mg Tablet] 400 mg PO DAILY 15 Days  tablet


Levothyroxine Sodium [Synthroid 0.15 mg Tablet] 0.15 mg PO DAILY #30 tablet


Metoprolol Succinate [Toprol Xl 25 mg Tab.sr] 50 mg PO Q12 30 Days


Home Medications: 








Alprazolam [Xanax] 1 mg PO TIDP PRN MDD 4 MG 12/20/17 


Amitriptyline HCl [Elavil 25 mg Tablet] 50 mg PO QHS 12/20/17 


Apixaban [Eliquis 5 mg Tablet] 5 mg PO Q12 12/20/17 


Zolpidem Tartrate [Ambien] 10 mg PO QHS 12/20/17 


Brexpiprazole [Rexulti] 2 mg PO QHS 11/16/18 


Desvenlafaxine [Desvenlafaxine ER] 50 mg PO DAILY 11/16/18 


Solifenacin Succinate [Vesicare] 5 mg PO DAILY 11/16/18 


Fluticasone Propionate [Flovent Hfa] 1 puff IH Q12 03/22/20 


Fluticasone/Salmeterol [Advair 250-50 Diskus 14 Dose/Diskus] 1 inh IH Q12 

03/22/20 


Glipizide [Glipizide Xl] 10 mg PO [id number] 03/22/20 


Oxycodone HCl/Acetaminophen [Percocet 5-325 mg Tablet] 1 tab PO Q8HP PRN 

03/22/20 


Prazosin HCl [Minipress] 2 mg PO QHS 03/22/20 


Sitagliptin Phosphate [Januvia 50 mg Tablet] 100 mg PO DAILY 03/22/20 


Tizanidine HCl 4 mg PO Q8 03/22/20 


Furosemide [Lasix 40 mg Tablet] 40 mg PO DAILY #30 tablet 03/31/20 


Levothyroxine Sodium [Synthroid 0.15 mg Tablet] 0.15 mg PO DAILY #30 tablet 

03/31/20 


Magnesium Oxide [Mag-Ox 400 mg Tablet] 400 mg PO DAILY 15 Days  tablet 03/31/20 


Metoprolol Succinate [Toprol Xl 25 mg Tab.sr] 50 mg PO Q12 30 Days 03/31/20 











History of Present Illiness


History of Present Illness: 


EVERARDO GEORGE is a 54 year old female who presented to the emergency room with 

an acute cough.  She admits the sudden development of a severe paroxysmal cough 

at approximately 5 PM on the day prior to admission.  Her cough caused her to 

have severe pain in her ribs/chest bilaterally.  Her cough was accompanied by 

moderate to severe dyspnea and associated with mild swelling in her hands and 

feet.  She denies other associated or accompanying signs and symptoms.  She 

denies prior similar episodes.  She admits exposure to a person who had active 

influenza 72 to 96 hours prior to her presentation.  She has not identified any 

aggravating or ameliorating factors for her cough.  In the emergency room she 

was found to have an elevated white blood count at 13,900 and a elevated lactic 

acid of 2.5.  She was noted to be tachypneic, tachycardic (A. fib with RVR) and 

hypoxic on evaluation.  She was treated with IV diltiazem using a bolus and 

continuous infusion for her tachycardia without significant improvement.  He was

subsequently given IV metoprolol also with a less than desired response.  She 

also received initial antibiotic therapy in the emergency room for a possible 

pneumonia although no pneumonia was noted on her pulmonary evaluations including

a CT scan.  A coronavirus screening was administered in the ER.  Patient was 

subsequently admitted to the hospital for further evaluation and treatment.











Hospital Course


Hospital Course: 


Patient was admitted for acute hypoxic respiratory failure requiring 6 L nasal 

cannula.  BNP on admission was 831.  CTA of the chest was done which showed no 

PE, no dissection, no definite pneumonia.  Patient was suspected to be having 

acute on chronic diastolic heart failure and was started on IV diuretics.  

Patient was also noted to have paroxysmal atrial fibrillation with rapid 

ventricular response.  She was started on treatment with beta-blocker.  Her Top

rol-XL dose was increased.  Patient's hypoxia improved and was gradually able to

be weaned off all oxygen and has been tolerating with SPO2 in the low 90s to mid

90s on room air.  Of note, given patient's respiratory symptoms and hypoxic 

respiratory failure, patient was tested for COVID-19 on admission.  

Unfortunately, at the time of discharge today, the results of this test are 

still pending.  Patient has been advised to self isolate at home and that she 

will be contacted by Department of Health for the results of her coronavirus 

test.  Patient has been stable for the past few days from a respiratory 

standpoint and is safe for discharge at this time.  Patient is being discharged 

on Lasix and an increased dose of Toprol-XL.  Of note thyroid function studies 

revealed patient is hypothyroid but patient is not on thyroid replacement 

therapy.  As a result I have started patient on Synthroid 0.15 mg daily given 

her weight and recommended repeat thyroid function test in 4 to 6 weeks.  Also c

ompleted antibiotic treatment for urinary tract infection/acute cystitis.





Physical Exam


Vital Signs: 


                                        











Temp Pulse Resp BP Pulse Ox


 


 97.3 F   61   21 H  111/47 L  91 L


 


 03/31/20 03:17  03/31/20 07:00  03/31/20 03:17  03/31/20 03:17  03/31/20 03:17








                                 Intake & Output











 03/30/20 03/31/20 04/01/20





 06:59 06:59 06:59


 


Intake Total 1030 600 


 


Output Total 1450 1100 


 


Balance -420 -500 


 


Weight 109.1 kg 106.6 kg 











General appearance: PRESENT: no acute distress, cooperative, morbidly obese.  

ABSENT: mild distress, severe distress


Neck exam: ABSENT: JVD


Respiratory exam: PRESENT: clear to auscultation pallavi, symmetrical, unlabored.  

ABSENT: accessory muscle use, chest wall tenderness, decreased breath sounds, 

retraction, stridor


Neurological exam: PRESENT: alert, awake, oriented to person, oriented to place,

oriented to time





Results


Laboratory Results: 


                                        











WBC  8.8 10^3/uL (4.0-10.5)   03/30/20  06:00    


 


RBC  4.48 10^6/uL (3.72-5.28)   03/30/20  06:00    


 


Hgb  14.4 g/dL (12.0-15.5)   03/30/20  06:00    


 


Hct  42.8 % (36.0-47.0)   03/30/20  06:00    


 


MCV  96 fl (80-97)   03/30/20  06:00    


 


MCH  32.1 pg (27.0-33.4)   03/30/20  06:00    


 


MCHC  33.6 g/dL (32.0-36.0)   03/30/20  06:00    


 


RDW  13.5 % (11.5-14.0)   03/30/20  06:00    


 


Plt Count  337 10^3/uL (150-450)   03/30/20  06:00    


 


Lymph % (Auto)  31.6 % (13-45)   03/30/20  06:00    


 


Mono % (Auto)  7.2 % (3-13)   03/30/20  06:00    


 


Eos % (Auto)  2.3 % (0-6)   03/30/20  06:00    


 


Baso % (Auto)  0.9 % (0-2)   03/30/20  06:00    


 


Absolute Neuts (auto)  5.1 10^3/uL (1.7-8.2)   03/30/20  06:00    


 


Absolute Lymphs (auto)  2.8 10^3/uL (0.5-4.7)   03/30/20  06:00    


 


Absolute Monos (auto)  0.6 10^3/uL (0.1-1.4)   03/30/20  06:00    


 


Absolute Eos (auto)  0.2 10^3/uL (0.0-0.6)   03/30/20  06:00    


 


Absolute Basos (auto)  0.1 10^3/uL (0.0-0.2)   03/30/20  06:00    


 


Seg Neutrophils %  58.0 % (42-78)   03/30/20  06:00    


 


PT  13.1 SEC (11.4-15.4)   03/21/20  21:18    


 


PT  Cancelled   03/21/20  21:18    


 


INR  0.99   03/21/20  21:18    


 


INR  Cancelled   03/21/20  21:18    


 


INR (Anticoag Therapy)  Cancelled   03/21/20  21:18    


 


APTT  28.2 SEC (23.5-35.8)   03/21/20  21:18    


 


VBG pH  7.31  (7.30-7.42)   03/21/20  23:25    


 


VBG pCO2  50.0 mmHg (35-63)   03/21/20  23:25    


 


VBG HCO3  24.5 mmol/L (20-32)   03/21/20  23:25    


 


VBG Base Excess  -2.3 mmol/L  03/21/20  23:25    


 


Sodium  133.7 mmol/L (137-145)  L  03/30/20  06:00    


 


Potassium  4.2 mmol/L (3.6-5.0)   03/30/20  06:00    


 


Chloride  96 mmol/L ()  L  03/30/20  06:00    


 


Carbon Dioxide  29 mmol/L (22-30)   03/30/20  06:00    


 


Anion Gap  9  (5-19)   03/30/20  06:00    


 


BUN  12 mg/dL (7-20)   03/30/20  06:00    


 


Creatinine  0.89 mg/dL (0.52-1.25)   03/30/20  06:00    


 


Est GFR ( Amer)  > 60  (>60)   03/30/20  06:00    


 


Est GFR (Non-Af Amer)  Cancelled   03/23/20  05:40    


 


Est GFR (MDRD) Non-Af  > 60  (>60)   03/30/20  06:00    


 


Glucose  142 mg/dL ()  H  03/30/20  06:00    


 


POC Glucose  236 mg/dL ()  H  03/31/20  11:28    


 


Hemoglobin A1c %  11.3 % (4.7-6.0)  H  03/22/20  04:31    


 


Lactic Acid  1.6 mmol/L (0.7-2.1)   03/22/20  12:30    


 


Calcium  9.4 mg/dL (8.4-10.2)   03/30/20  06:00    


 


Magnesium  2.0 mg/dL (1.6-2.3)   03/30/20  06:00    


 


Total Bilirubin  0.9 mg/dL (0.2-1.3)   03/30/20  06:00    


 


Direct Bilirubin  0.0 mg/dL (0.0-0.4)   03/30/20  06:00    


 


Neonat Total Bilirubin  Not Reportable   03/30/20  06:00    


 


Neonat Direct Bilirubin  Not Reportable   03/30/20  06:00    


 


Neonat Indirect Bili  Not Reportable   03/30/20  06:00    


 


AST  41 U/L (14-36)  H  03/30/20  06:00    


 


ALT  26 U/L (<35)   03/30/20  06:00    


 


Alkaline Phosphatase  142 U/L ()  H  03/30/20  06:00    


 


Creatine Kinase  120 U/L ()   03/21/20  22:48    


 


CK-MB (CK-2)  1.61 ng/mL (<4.55)   03/21/20  21:18    


 


Troponin I  < 0.012 ng/mL  03/22/20  20:20    


 


NT-Pro-B Natriuret Pep  831 pg/mL (<125)  H  03/21/20  22:48    


 


Total Protein  6.6 g/dL (6.3-8.2)   03/30/20  06:00    


 


Albumin  3.9 g/dL (3.5-5.0)   03/30/20  06:00    


 


EGFR   Cancelled   03/23/20  05:40    


 


TSH  12.20 uIU/mL (0.47-4.68)  H  03/23/20  06:45    


 


Free T3 pg/mL  2.68 pg/mL (2.77-5.27)  L  03/23/20  06:45    


 


Urine Color  YELLOW   03/22/20  01:21    


 


Urine Appearance  SLIGHTLY-CLOUDY   03/22/20  01:21    


 


Urine pH  5.0  (5.0-9.0)   03/22/20  01:21    


 


Ur Specific Gravity  > 1.060   03/22/20  01:21    


 


Urine Protein  100 mg/dL (NEGATIVE)  H  03/22/20  01:21    


 


Urine Glucose (UA)  150 mg/dL (NEGATIVE)  H  03/22/20  01:21    


 


Urine Ketones  NEGATIVE mg/dL (NEGATIVE)   03/22/20  01:21    


 


Urine Blood  SMALL  (NEGATIVE)  H  03/22/20  01:21    


 


Urine Nitrite (Reflex)  NEGATIVE  (NEGATIVE)   03/22/20  01:21    


 


Urine Bilirubin  NEGATIVE  (NEGATIVE)   03/22/20  01:21    


 


Urine Urobilinogen  NEGATIVE mg/dL (<2.0)   03/22/20  01:21    


 


Leukocyte Esterase Rfl  MODERATE  (NEGATIVE)  H  03/22/20  01:21    


 


Urine RBC (Auto)  34 /HPF  03/22/20  01:21    


 


Urine Bacteria (Auto)  TRACE /HPF  03/22/20  01:21    


 


Urine WBC (Reflex)  21 /HPF  03/22/20  01:21    


 


Squamous Epi Cells Auto  28 /HPF  03/22/20  01:21    


 


Urine Mucus (Auto)  RARE /LPF  03/22/20  01:21    


 


Urine Ascorbic Acid  NEGATIVE  (NEGATIVE)   03/22/20  01:21    


 


Time Trough Drawn  0447   03/24/20  04:47    


 


Vancomycin Trough  10.3 ug/mL (5.0-20.0)   03/24/20  04:47    


 


Digoxin  1.33 ng/mL (0.8-2.0)   03/25/20  07:07    


 


COVID-19 Source  NASAL WASHING   03/21/20  21:15    


 


Influenza A (Rapid)  NEGATIVE  (NEGATIVE)   03/21/20  21:15    


 


Influenza B (Rapid)  NEGATIVE  (NEGATIVE)   03/21/20  21:15    








                                        











  03/21/20 03/21/20 03/22/20





  21:18 22:48 00:35


 


CK-MB (CK-2)  1.61  


 


Troponin I  < 0.012   < 0.012


 


NT-Pro-B Natriuret Pep   831 H 














  03/22/20 03/22/20 03/22/20





  04:31 08:23 12:30


 


CK-MB (CK-2)   


 


Troponin I  < 0.012  < 0.012  < 0.012


 


NT-Pro-B Natriuret Pep   














  03/22/20 03/22/20





  16:00 20:20


 


CK-MB (CK-2)  


 


Troponin I  < 0.012  < 0.012


 


NT-Pro-B Natriuret Pep  











Impressions: 


                                        





Chest X-Ray  03/21/20 21:15


IMPRESSION:


Mild patchy basilar opacities are similar in appearance to prior


study. Clinical correlation is advised. The possibility of


infection is not excluded.


 








Chest/Abdomen CTA  03/21/20 22:09


IMPRESSION: 


 


No aortic dissection or aneurysm. No pulmonary embolus. No


definite pneumonia.


 














Plan


Time Spent: Greater than 30 Minutes





Stroke


Is this a Stroke Patient?: No





Acute Heart Failure





- **


Is this a Heart Failure Patient?: Yes


Documentation of LVEF assessment?: Planned for after discharge


LVEF < 40%?: No- if no continue to question #3


3. Anticoagulant therapy for permanect/persistent/paraoxysmal Afib or Aflutter: 

Yes

## 2020-05-12 ENCOUNTER — HOSPITAL ENCOUNTER (EMERGENCY)
Dept: HOSPITAL 62 - ER | Age: 55
LOS: 1 days | Discharge: HOME | End: 2020-05-13
Payer: MEDICAID

## 2020-05-12 VITALS — DIASTOLIC BLOOD PRESSURE: 78 MMHG | SYSTOLIC BLOOD PRESSURE: 134 MMHG

## 2020-05-12 DIAGNOSIS — Z88.2: ICD-10-CM

## 2020-05-12 DIAGNOSIS — I11.0: ICD-10-CM

## 2020-05-12 DIAGNOSIS — I48.91: ICD-10-CM

## 2020-05-12 DIAGNOSIS — N12: Primary | ICD-10-CM

## 2020-05-12 DIAGNOSIS — Z79.4: ICD-10-CM

## 2020-05-12 DIAGNOSIS — I50.9: ICD-10-CM

## 2020-05-12 DIAGNOSIS — R35.0: ICD-10-CM

## 2020-05-12 DIAGNOSIS — R10.9: ICD-10-CM

## 2020-05-12 DIAGNOSIS — R11.2: ICD-10-CM

## 2020-05-12 DIAGNOSIS — Z88.8: ICD-10-CM

## 2020-05-12 DIAGNOSIS — Z88.0: ICD-10-CM

## 2020-05-12 DIAGNOSIS — R10.30: ICD-10-CM

## 2020-05-12 DIAGNOSIS — R50.9: ICD-10-CM

## 2020-05-12 DIAGNOSIS — E11.9: ICD-10-CM

## 2020-05-12 DIAGNOSIS — R30.0: ICD-10-CM

## 2020-05-12 LAB
ADD MANUAL DIFF: NO
ALBUMIN SERPL-MCNC: 4 G/DL (ref 3.5–5)
ALP SERPL-CCNC: 177 U/L (ref 38–126)
ANION GAP SERPL CALC-SCNC: 10 MMOL/L (ref 5–19)
APPEARANCE UR: CLEAR
APTT PPP: (no result) S
AST SERPL-CCNC: 21 U/L (ref 14–36)
BACTERIA #/AREA URNS HPF: (no result) /HPF
BASE EXCESS BLDV CALC-SCNC: 1 MMOL/L
BASOPHILS # BLD AUTO: 0.2 10^3/UL (ref 0–0.2)
BASOPHILS NFR BLD AUTO: 1.1 % (ref 0–2)
BILIRUB DIRECT SERPL-MCNC: 0 MG/DL (ref 0–0.4)
BILIRUB SERPL-MCNC: 0.8 MG/DL (ref 0.2–1.3)
BILIRUB UR QL STRIP: NEGATIVE
BUN SERPL-MCNC: 17 MG/DL (ref 7–20)
CALCIUM: 9.4 MG/DL (ref 8.4–10.2)
CHLORIDE SERPL-SCNC: 95 MMOL/L (ref 98–107)
CO2 SERPL-SCNC: 26 MMOL/L (ref 22–30)
EOSINOPHIL # BLD AUTO: 0.1 10^3/UL (ref 0–0.6)
EOSINOPHIL NFR BLD AUTO: 0.4 % (ref 0–6)
ERYTHROCYTE [DISTWIDTH] IN BLOOD BY AUTOMATED COUNT: 13.7 % (ref 11.5–14)
GLUCOSE SERPL-MCNC: 343 MG/DL (ref 75–110)
GLUCOSE UR STRIP-MCNC: 500 MG/DL
HCO3 BLDV-SCNC: 25.4 MMOL/L (ref 20–32)
HCT VFR BLD CALC: 43.3 % (ref 36–47)
HGB BLD-MCNC: 14.5 G/DL (ref 12–15.5)
INR PPP: 1.16
KETONES UR STRIP-MCNC: NEGATIVE MG/DL
LYMPHOCYTES # BLD AUTO: 2.9 10^3/UL (ref 0.5–4.7)
LYMPHOCYTES NFR BLD AUTO: 20.5 % (ref 13–45)
MCH RBC QN AUTO: 31.9 PG (ref 27–33.4)
MCHC RBC AUTO-ENTMCNC: 33.6 G/DL (ref 32–36)
MCV RBC AUTO: 95 FL (ref 80–97)
MONOCYTES # BLD AUTO: 0.6 10^3/UL (ref 0.1–1.4)
MONOCYTES NFR BLD AUTO: 4.2 % (ref 3–13)
NEUTROPHILS # BLD AUTO: 10.6 10^3/UL (ref 1.7–8.2)
NEUTS SEG NFR BLD AUTO: 73.8 % (ref 42–78)
PCO2 BLDV: 40 MMHG (ref 35–63)
PH BLDV: 7.42 [PH] (ref 7.3–7.42)
PH UR STRIP: 6 [PH] (ref 5–9)
PLATELET # BLD: 388 10^3/UL (ref 150–450)
POTASSIUM SERPL-SCNC: 4.2 MMOL/L (ref 3.6–5)
PROT SERPL-MCNC: 6.6 G/DL (ref 6.3–8.2)
PROT UR STRIP-MCNC: NEGATIVE MG/DL
PROTHROMBIN TIME: 14.9 SEC (ref 11.4–15.4)
RBC # BLD AUTO: 4.56 10^6/UL (ref 3.72–5.28)
SP GR UR STRIP: 1.02
TOTAL CELLS COUNTED % (AUTO): 100 %
UROBILINOGEN UR-MCNC: 4 MG/DL (ref ?–2)
WBC # BLD AUTO: 14.3 10^3/UL (ref 4–10.5)

## 2020-05-12 PROCEDURE — 81001 URINALYSIS AUTO W/SCOPE: CPT

## 2020-05-12 PROCEDURE — 87040 BLOOD CULTURE FOR BACTERIA: CPT

## 2020-05-12 PROCEDURE — 80053 COMPREHEN METABOLIC PANEL: CPT

## 2020-05-12 PROCEDURE — 93010 ELECTROCARDIOGRAM REPORT: CPT

## 2020-05-12 PROCEDURE — 99284 EMERGENCY DEPT VISIT MOD MDM: CPT

## 2020-05-12 PROCEDURE — 36415 COLL VENOUS BLD VENIPUNCTURE: CPT

## 2020-05-12 PROCEDURE — 85610 PROTHROMBIN TIME: CPT

## 2020-05-12 PROCEDURE — 84484 ASSAY OF TROPONIN QUANT: CPT

## 2020-05-12 PROCEDURE — 83605 ASSAY OF LACTIC ACID: CPT

## 2020-05-12 PROCEDURE — 96374 THER/PROPH/DIAG INJ IV PUSH: CPT

## 2020-05-12 PROCEDURE — 96361 HYDRATE IV INFUSION ADD-ON: CPT

## 2020-05-12 PROCEDURE — 85025 COMPLETE CBC W/AUTO DIFF WBC: CPT

## 2020-05-12 PROCEDURE — 82962 GLUCOSE BLOOD TEST: CPT

## 2020-05-12 PROCEDURE — 93005 ELECTROCARDIOGRAM TRACING: CPT

## 2020-05-12 PROCEDURE — 82803 BLOOD GASES ANY COMBINATION: CPT

## 2020-05-12 PROCEDURE — 71045 X-RAY EXAM CHEST 1 VIEW: CPT

## 2020-05-12 NOTE — RADIOLOGY REPORT (SQ)
EXAM DESCRIPTION: 



XR CHEST 1 VIEW



COMPLETED DATE/TME:  05/12/2020 21:34



CLINICAL HISTORY:  54 years  Female  shortness of breath



COMPARISON:  3/21/2020.



FINDINGS: 



Cardiac size appears prominent but unchanged. No acute

consolidation. No pneumothorax. No pleural fluid. Overlying

monitoring devices are present. 







IMPRESSION: 



No acute abnormality is identified.

## 2020-05-13 NOTE — ER DOCUMENT REPORT
ED General





- General


Chief Complaint: Nausea/Vomiting


Stated Complaint: VOMITING


Primary Care Provider: 


RIKI ALAS DO [Primary Care Provider] - Follow up as needed


TRAVEL OUTSIDE OF THE [place] IN LAST 30 DAYS: No





- HPI


Notes: 





54-year-old female history of diabetes, hypertension, A. fib, CHF presents with 

approximately 3 days of bilateral flank pain and suprapubic discomfort 

associated with subjective fever, dysuria, frequency, and several episodes of 

nonbloody nonbilious emesis.  Patient denies any GYN symptoms, measured fever, 

other abdominal pain other than mild suprapubic discomfort, diarrhea, 

constipation, melena/bright red blood per rectum, recent antibiotics





- Related Data


Allergies/Adverse Reactions: 


                                        





tramadol Allergy (Intermediate, Verified 12/15/19 17:06)


   AMS


Penicillins Allergy (Mild, Verified 12/15/19 17:06)


   rash


Sulfa (Sulfonamide Antibiotics) Allergy (Mild, Verified 12/15/19 17:06)


   rash


aspirin Allergy (Verified 12/15/19 17:06)


   NO ASA











Past Medical History





- General


Information source: Patient





- Social History


Smoking Status: Former Smoker


Family History: CAD, CVA, DM, Hyperlipidemia, Hypertension, Malignancy, Thyroid 

Disfunction, Other - Asthma


Patient has homicidal ideation: No





- Past Medical History


Cardiac Medical History: Reports: Hx Atrial Fibrillation, Hx Congestive Heart 

Failure, Hx Hypercholesterolemia, Hx Hypertension


   Denies: Hx Coronary Artery Disease, Hx DVT, Hx Heart Attack, Hx Pulmonary 

Embolism


Pulmonary Medical History: Reports: Hx Asthma, Hx Bronchitis


   Denies: Hx COPD, Hx Pneumonia


Neurological Medical History: Reports: Hx Migraine.  Denies: Hx Cerebrovascular 

Accident, Hx Seizures


Endocrine Medical History: Reports: Hx Diabetes Mellitus Type 2, Hx 

Hypothyroidism.  Denies: Hx Diabetes Mellitus Type 1, Hx Hyperthyroidism


Renal/ Medical History: Denies: Hx Peritoneal Dialysis


GI Medical History: Reports: Hx Gastritis, Hx Gastroesophageal Reflux Disease.  

Denies: Hx Cirrhosis, Hx Crohn's Disease, Hx Hepatitis, Hx Ulcerative Colitis


Musculoskeletal Medical History: Reports Hx Arthritis, Denies Hx Gout, Reports 

Hx Musculoskeletal Trauma


Skin Medical History: Denies Hx Eczema, Denies Hx Psoriasis


Psychiatric Medical History: Reports: Hx Anxiety, Hx Depression, Hx 

Schizoaffective Disorder, Hx Schizophrenia - schizo affective


Infectious Medical History: Denies: Hx Hepatitis


Past Surgical History: Reports: Hx Cholecystectomy, Hx Tubal Ligation.  Denies: 

Hx Hysterectomy





- Immunizations


Immunizations up to date: Yes


Hx Diphtheria, Pertussis, Tetanus Vaccination: Yes - 2013





Review of Systems





- Review of Systems


Notes: 





REVIEW OF SYSTEMS:


CONSTITUTIONAL :  +fever, +chills


EENT: Denies recent cold/sinus symptoms, denies throat pain


CARDIOVASCULAR:  Denies chest pain, SILVERIO


RESPIRATORY:  Denies cough, denies shortness of breath.


GASTROINTESTINAL:  Denies abdominal pain, +nausea/vomiting.


GENITOURINARY:  Denies difficulty urinating, painful urination.


FEMALE  GENITOURINARY:  Denies abnormal vaginal bleeding, vaginal discharge.


MUSCULOSKELETAL:  Denies neck pain, back pain.


SKIN:   Denies rash or skin lesions.


HEMATOLOGIC :   Denies easy bruising or bleeding.


LYMPHATIC:  Denies swollen, enlarged glands.


NEUROLOGICAL:  Denies headache, denies change in gait.


PSYCHIATRIC:  Denies anxiety or stress or depression.





Physical Exam





- Vital signs


Vitals: 


                                        











Temp


 


 98.7 F 


 


 05/12/20 20:25














- Notes


Notes: 





PHYSICAL EXAMINATION:


 


GENERAL: Well-appearing and in no acute distress.


 


HEAD: Atraumatic, normocephalic.


 


EYES: Pupils equal round and appropriate constriction, sclera anicteric, 

conjunctiva are normal.


 


ENT: nares patent, moist mucous membranes.


 


NECK: Normal range of motion, supple without lymphadenopathy


 


LUNGS: Breath sounds clear to auscultation bilaterally and equal.  No wheezes 

rales or rhonchi.


 


HEART: Regular rate and rhythm without murmurs


 


ABDOMEN: Soft, nontender, no guarding, no masses, +CVAT b/l


 


EXTREMITIES: Normal range of motion, no pitting or edema.  No cyanosis.


 


NEUROLOGICAL: Awake, alert, conversing appropriately, moves all extremities 

spontaneously.


 


PSYCH: Normal mood, normal affect.


 


SKIN: Warm, Dry, normal turgor, no rashes or lesions noted.





Course





- Re-evaluation


Re-evalutation: 





05/13/20 01:12


Patient with symptoms consistent with pyelonephritis.  No signs symptoms or 

physical exam findings suggestive of kidney stones or GYN etiology.  Patient 

with several episodes of vomiting but intermittently tolerating p.o. for many 

hours.  Hyperglycemic with no anion gap, not consistent with DKA or HONK.  After

Zofran patient tolerating p.o. in ED without any difficulty, feels improved, 

gave first dose of Levaquin as patient's insurance unlikely to cover secondary 

third-generation p.o. cephalosporin and concern for patient noncompliance with 

schedule for penicillin.  Patient's lactate borderline likely secondary to mild 

dehydration from vomiting and hyperglycemia, will discharge if repeat lactate 

has normalized.  Given that patient tolerating p.o., symptoms controlled, eufemia pan appropriate for outpatient treatment trial but gave extensive return to ED

precautions which patient demonstrated understanding of.


05/13/20 01:45


Lactate normalized on repeat, hyperglycemia has improved, patient feels 

improved, patient ready for discharge on p.o. antibiotics with close outpatient 

PCP follow-up.





- Vital Signs


Vital signs: 


                                        











Temp Pulse Resp BP Pulse Ox


 


 98.7 F      21 H  134/78 H  95 


 


 05/12/20 20:25     05/12/20 22:01  05/12/20 22:00  05/12/20 23:00














- Laboratory


Result Diagrams: 


                                 05/12/20 21:47





                                 05/12/20 21:47


Laboratory results interpreted by me: 


                                        











  05/12/20 05/12/20 05/12/20





  21:18 21:47 21:47


 


WBC   14.3 H 


 


Absolute Neuts (auto)   10.6 H 


 


Sodium    131.3 L


 


Chloride    95 L


 


Est GFR (MDRD) Non-Af    55 L


 


Glucose    343 H


 


POC Glucose   


 


Alkaline Phosphatase    177 H


 


Urine Glucose (UA)  500 H  


 


Urine Blood  SMALL H  


 


Urine Nitrite (Reflex)  POSITIVE H  


 


Urine Urobilinogen  4.0 H  


 


Leukocyte Esterase Rfl  TRACE H  














  05/13/20





  00:55


 


WBC 


 


Absolute Neuts (auto) 


 


Sodium 


 


Chloride 


 


Est GFR (MDRD) Non-Af 


 


Glucose 


 


POC Glucose  309 H


 


Alkaline Phosphatase 


 


Urine Glucose (UA) 


 


Urine Blood 


 


Urine Nitrite (Reflex) 


 


Urine Urobilinogen 


 


Leukocyte Esterase Rfl 














Discharge





- Discharge


Clinical Impression: 


 Pyelonephritis





Type 2 diabetes mellitus


Qualifiers:


 Diabetes mellitus long term insulin use: with long term use Diabetes mellitus 

complication status: without complication Qualified Code(s): E11.9 - Type 2 

diabetes mellitus without complications; Z79.4 - Long term (current) use of 

insulin





Condition: Good


Disposition: HOME, SELF-CARE


Additional Instructions: 


Pyelonephritis





     Your evaluation shows evidence of pyelonephritis.  This is an infection in 

the kidney.  Typical symptoms are fever, pain in the flank, pain on urination, 

and frequent urination.


     Many cases of pyelonephritis can be treated at home.  Hospital care may be 

necessary for patients who are very ill, or elderly or pregnant.


     Pyelonephritis is treated with antibiotics.  Be sure to take all the 

medication as prescribed.  Drink plenty of liquids (about three quarts per day).

 You may take acetaminophen for fever.  You should feel significantly improved w

ithin two days.


     Return for a re-examination if your symptoms worsen in any way -- such as 

high fever, shaking chills, severe weakness or dizziness, severe pain, or 

inability to pass your urine.





Hyperglycemia (High Blood Sugar)





     You have an abnormally high blood sugar. Uncontrolled high blood sugar 

leads to early heart disease, strokes, nerve damage, eye damage, and kidney 

damage.


     Call the physician if there is faintness, excess sleepiness, or very rapid 

breathing.








Follow-up with primary doctor within 1 week.


Prescriptions: 


Levofloxacin [Levaquin 750 mg Tablet] 750 mg PO DAILY #4 tablet


Referrals: 


RIKI ALAS DO [Primary Care Provider] - Follow up as needed

## 2020-05-13 NOTE — EKG REPORT
SEVERITY:- ABNORMAL ECG -

SINUS RHYTHM

LEFT ATRIAL ABNORMALITY

NONSPECIFIC T ABNORMALITIES, DIFFUSE LEADS

:

Confirmed by: Reuben Eisenberg 13-May-2020 08:38:49

## 2020-06-03 ENCOUNTER — HOSPITAL ENCOUNTER (EMERGENCY)
Dept: HOSPITAL 62 - ER | Age: 55
LOS: 1 days | Discharge: HOME | End: 2020-06-04
Payer: MEDICAID

## 2020-06-03 DIAGNOSIS — Z88.6: ICD-10-CM

## 2020-06-03 DIAGNOSIS — I50.9: ICD-10-CM

## 2020-06-03 DIAGNOSIS — Z88.0: ICD-10-CM

## 2020-06-03 DIAGNOSIS — Z90.49: ICD-10-CM

## 2020-06-03 DIAGNOSIS — I11.0: ICD-10-CM

## 2020-06-03 DIAGNOSIS — E78.00: ICD-10-CM

## 2020-06-03 DIAGNOSIS — I48.91: ICD-10-CM

## 2020-06-03 DIAGNOSIS — E11.9: ICD-10-CM

## 2020-06-03 DIAGNOSIS — Z98.51: ICD-10-CM

## 2020-06-03 DIAGNOSIS — M25.561: Primary | ICD-10-CM

## 2020-06-03 DIAGNOSIS — Z79.02: ICD-10-CM

## 2020-06-03 PROCEDURE — 85025 COMPLETE CBC W/AUTO DIFF WBC: CPT

## 2020-06-03 PROCEDURE — 93971 EXTREMITY STUDY: CPT

## 2020-06-03 PROCEDURE — 80053 COMPREHEN METABOLIC PANEL: CPT

## 2020-06-03 PROCEDURE — 99285 EMERGENCY DEPT VISIT HI MDM: CPT

## 2020-06-03 PROCEDURE — 36415 COLL VENOUS BLD VENIPUNCTURE: CPT

## 2020-06-03 NOTE — RADIOLOGY REPORT (SQ)
EXAM DESCRIPTION: X-rays, four views of the right knee



CLINICAL HISTORY: 55 years Female, pain 3 days



COMPARISON: None.



FINDINGS: Medial joint space compartment narrowing is identified.

A small knee effusion is seen. No osteophyte formation. No

erosions or periostitis. No fracture.



IMPRESSION:

Medial joint space compartment narrowing. No fracture.

## 2020-06-03 NOTE — ER DOCUMENT REPORT
ED Medical Screen (RME)





- General


TRAVEL OUTSIDE OF THE [place] IN LAST 30 DAYS: No





- General


Chief Complaint: Knee Pain


Stated Complaint: RIGHT KNEE PAIN


Time Seen by Provider: 06/03/20 21:34


Primary Care Provider: 


RIKI ALAS DO [Primary Care Provider] - Follow up as needed


Notes: 





HPI: 55-year-old female who is on Eliquis for atrial fibrillation history 

presenting with right knee pain in the anterior and posterior knee for 3 days.  

No trauma.  Hurts to walk and move.  Patient states she also missed a dose of 

Eliquis this morning.





PHYSICAL EXAMINATION: There is no visible effusion over the anterior right knee.

 There is tenderness on palpation of the anterior and posterior right knee.  No 

visible bruising.  There is tenderness midline through the right calf on palpat

ion.











I have greeted and performed a rapid initial assessment of this patient.  A 

comprehensive ED assessment and evaluation of the patient, analysis of test 

results and completion of medical decision making process will be conducted by 

an additional ED providers. (ZAHRAA LOO)





- Related Data


Allergies/Adverse Reactions: 


                                        





tramadol Allergy (Intermediate, Verified 12/15/19 17:06)


   AMS


Penicillins Allergy (Mild, Verified 12/15/19 17:06)


   rash


Sulfa (Sulfonamide Antibiotics) Allergy (Mild, Verified 12/15/19 17:06)


   rash


aspirin Allergy (Verified 12/15/19 17:06)


   NO ASA











Past Medical History





- Past Medical History


Cardiac Medical History: Reports: Hx Atrial Fibrillation, Hx Congestive Heart 

Failure, Hx Hypercholesterolemia, Hx Hypertension


   Denies: Hx Coronary Artery Disease, Hx DVT, Hx Heart Attack, Hx Pulmonary 

Embolism


Pulmonary Medical History: Reports: Hx Asthma, Hx Bronchitis


   Denies: Hx COPD, Hx Pneumonia


Neurological Medical History: Reports: Hx Migraine.  Denies: Hx Cerebrovascular 

Accident, Hx Seizures


Endocrine Medical History: Reports: Hx Diabetes Mellitus Type 2, Hx 

Hypothyroidism.  Denies: Hx Diabetes Mellitus Type 1, Hx Hyperthyroidism


Renal/ Medical History: Denies: Hx Peritoneal Dialysis


GI Medical History: Reports: Hx Gastritis, Hx Gastroesophageal Reflux Disease.  

Denies: Hx Cirrhosis, Hx Crohn's Disease, Hx Hepatitis, Hx Ulcerative Colitis


Musculoskeltal Medical History: Reports Hx Arthritis, Denies Hx Gout, Reports Hx

Musculoskeletal Trauma


Skin Medical History: Denies Hx Eczema, Denies Hx Psoriasis


Psychiatric Medical History: Reports: Hx Anxiety, Hx Depression, Hx 

Schizoaffective Disorder, Hx Schizophrenia - schizo affective


Infectious Medical History: Denies: Hx Hepatitis


Past Surgical History: Reports: Hx Cholecystectomy, Hx Tubal Ligation.  Denies: 

Hx Hysterectomy





- Immunizations


Immunizations up to date: Yes


Hx Diphtheria, Pertussis, Tetanus Vaccination: Yes - 2013





Physical Exam





- Vital signs


Vitals: 





                                        











Temp Pulse Resp BP Pulse Ox


 


 98.7 F   109 H  16   129/67 H  94 


 


 06/03/20 20:29  06/03/20 20:29  06/03/20 20:29  06/03/20 20:29  06/03/20 20:29














Course





- Vital Signs


Vital signs: 





                                        











Temp Pulse Resp BP Pulse Ox


 


 98.7 F   109 H  16   129/67 H  94 


 


 06/03/20 21:31  06/03/20 20:29  06/03/20 20:29  06/03/20 20:29  06/03/20 20:29














Doctor's Discharge





- Discharge


Referrals: 


RIKI ALAS DO [Primary Care Provider] - Follow up as needed

## 2020-06-03 NOTE — RADIOLOGY REPORT (SQ)
INDICATION:  right knee/calf pain.



PROCEDURE:  Real-time grayscale, color, and pulse Doppler

ultrasound imaging of the right lower extremity deep venous

system was performed.  37 images.



COMPARISON: None



FINDINGS: 

The common femoral, superficial femoral, and popliteal veins  

demonstrate normal compressibility, phasic flow, augmentation and

gray scale evaluation. There is no evidence of intraluminal

thrombus .  The visualized deep calf veins appear patent.







IMPRESSION:  

No evidence for acute deep venous thrombus from the common

femoral to the popliteal veins.

## 2020-06-04 VITALS — SYSTOLIC BLOOD PRESSURE: 126 MMHG | DIASTOLIC BLOOD PRESSURE: 88 MMHG

## 2020-06-04 LAB
ADD MANUAL DIFF: NO
ALBUMIN SERPL-MCNC: 4 G/DL (ref 3.5–5)
ALP SERPL-CCNC: 186 U/L (ref 38–126)
ANION GAP SERPL CALC-SCNC: 11 MMOL/L (ref 5–19)
AST SERPL-CCNC: 18 U/L (ref 14–36)
BASOPHILS # BLD AUTO: 0.1 10^3/UL (ref 0–0.2)
BASOPHILS NFR BLD AUTO: 1.2 % (ref 0–2)
BILIRUB DIRECT SERPL-MCNC: 0 MG/DL (ref 0–0.4)
BILIRUB SERPL-MCNC: 0.3 MG/DL (ref 0.2–1.3)
BUN SERPL-MCNC: 15 MG/DL (ref 7–20)
CALCIUM: 9.5 MG/DL (ref 8.4–10.2)
CHLORIDE SERPL-SCNC: 96 MMOL/L (ref 98–107)
CO2 SERPL-SCNC: 25 MMOL/L (ref 22–30)
EOSINOPHIL # BLD AUTO: 0.2 10^3/UL (ref 0–0.6)
EOSINOPHIL NFR BLD AUTO: 1.9 % (ref 0–6)
ERYTHROCYTE [DISTWIDTH] IN BLOOD BY AUTOMATED COUNT: 14.4 % (ref 11.5–14)
GLUCOSE SERPL-MCNC: 324 MG/DL (ref 75–110)
HCT VFR BLD CALC: 41.9 % (ref 36–47)
HGB BLD-MCNC: 13.6 G/DL (ref 12–15.5)
LYMPHOCYTES # BLD AUTO: 3.3 10^3/UL (ref 0.5–4.7)
LYMPHOCYTES NFR BLD AUTO: 29.3 % (ref 13–45)
MCH RBC QN AUTO: 30.8 PG (ref 27–33.4)
MCHC RBC AUTO-ENTMCNC: 32.4 G/DL (ref 32–36)
MCV RBC AUTO: 95 FL (ref 80–97)
MONOCYTES # BLD AUTO: 0.5 10^3/UL (ref 0.1–1.4)
MONOCYTES NFR BLD AUTO: 4.3 % (ref 3–13)
NEUTROPHILS # BLD AUTO: 7 10^3/UL (ref 1.7–8.2)
NEUTS SEG NFR BLD AUTO: 63.3 % (ref 42–78)
PLATELET # BLD: 371 10^3/UL (ref 150–450)
POTASSIUM SERPL-SCNC: 4.5 MMOL/L (ref 3.6–5)
PROT SERPL-MCNC: 6.7 G/DL (ref 6.3–8.2)
RBC # BLD AUTO: 4.41 10^6/UL (ref 3.72–5.28)
TOTAL CELLS COUNTED % (AUTO): 100 %
WBC # BLD AUTO: 11.1 10^3/UL (ref 4–10.5)

## 2020-06-04 NOTE — ER DOCUMENT REPORT
ED General





- General


Chief Complaint: Knee Pain


Stated Complaint: RIGHT KNEE PAIN


Time Seen by Provider: 20 21:34


Primary Care Provider: 


RIKI ALAS DO [Primary Care Provider] - Follow up as needed


Mode of Arrival: Ambulatory


Information source: Patient


Notes: 


triage notes


20 21:40 - ED Nursing Note by ADRYAN BROWN


   Accisabel Num: [id number]  : 1965  Patient Age: 55





pt w/pain in rt knee for 3 days. denies injury/trauma to area.  pt also w/pain 

in rt calf. cms to foot good.





Initialized on 20 21:40 - END OF NOTE


NP Donnie notes


HPI: 55-year-old female who is on Eliquis for atrial fibrillation history 

presenting with right knee pain in the anterior and posterior knee for 3 days.  

No trauma.  Hurts to walk and move.  Patient states she also missed a dose of 

Eliquis this morning.





PHYSICAL EXAMINATION: There is no visible effusion over the anterior right knee.

 There is tenderness on palpation of the anterior and posterior right knee.  No 

visible bruising.  There is tenderness midline through the right calf on 

palpation.


my notes


55-year-old female arrives with 3-day history of right knee pain in both the 

anterior and posterior knee.  Patient reports it hurts to walk and move her 

right knee.  Patient missed her 1 dose of Eliquis this morning.  Ultrasound for 

DVT were negative today and x-ray was positive for no he is got dialysis on a 

right 


space narrowing.  Patient reports she had a clot in her left brain which caused 

a problem.. But says she does not have a stroke around 6 months ago.  She has 

weakness of her right knee and is able to use her right hand.  She has some 

problems with speech.  She denies any headache at this time.  She reports she 

tried to move her right knee up on the bed and it was quite painful today.


TRAVEL OUTSIDE OF THE [place] IN LAST 30 DAYS: No





- HPI


Onset: This morning


Onset/Duration: Sudden, Persistent, Worse


Quality of pain: Achy


Severity: Moderate


Pain Level: 2


Associated symptoms: None


Exacerbated by: Movement, Walking


Relieved by: Denies


Similar symptoms previously: No


Recently seen / treated by doctor: No





- Related Data


Allergies/Adverse Reactions: 


                                        





tramadol Allergy (Intermediate, Verified 12/15/19 17:06)


   AMS


Penicillins Allergy (Mild, Verified 12/15/19 17:06)


   rash


Sulfa (Sulfonamide Antibiotics) Allergy (Mild, Verified 12/15/19 17:06)


   rash


aspirin Allergy (Verified 12/15/19 17:06)


   NO ASA








Home Medications: pt doesn't have list





Past Medical History





- General


Information source: Patient





- Social History


Smoking Status: Never Smoker


Cigarette use (# per day): No


Chew tobacco use (# tins/day): No


Smoking Education Provided: No


Frequency of alcohol use: None


Drug Abuse: None


Lives with: Family


Family History: CAD, CVA, DM, Hyperlipidemia, Hypertension, Malignancy, Thyroid 

Disfunction, Other - Asthma


Patient has homicidal ideation: No





- Past Medical History


Cardiac Medical History: Reports: Hx Atrial Fibrillation, Hx Congestive Heart 

Failure, Hx Hypercholesterolemia, Hx Hypertension


   Denies: Hx Coronary Artery Disease, Hx DVT, Hx Heart Attack, Hx Pulmonary 

Embolism


Pulmonary Medical History: Reports: Hx Asthma, Hx Bronchitis


   Denies: Hx COPD, Hx Pneumonia


Neurological Medical History: Reports: Hx Migraine.  Denies: Hx Cerebrovascular 

Accident, Hx Seizures


Endocrine Medical History: Reports: Hx Diabetes Mellitus Type 2, Hx 

Hypothyroidism.  Denies: Hx Diabetes Mellitus Type 1, Hx Hyperthyroidism


Renal/ Medical History: Denies: Hx Peritoneal Dialysis


GI Medical History: Reports: Hx Gastritis, Hx Gastroesophageal Reflux Disease.  

Denies: Hx Cirrhosis, Hx Crohn's Disease, Hx Hepatitis, Hx Ulcerative Colitis


Musculoskeletal Medical History: Reports Hx Arthritis, Denies Hx Gout, Reports 

Hx Musculoskeletal Trauma


Skin Medical History: Denies Hx Eczema, Denies Hx Psoriasis


Psychiatric Medical History: Reports: Hx Anxiety, Hx Depression, Hx 

Schizoaffective Disorder, Hx Schizophrenia - schizo affective


Infectious Medical History: Denies: Hx Hepatitis


Past Surgical History: Reports: Hx Cholecystectomy, Hx Tubal Ligation.  Denies: 

Hx Hysterectomy





- Immunizations


Immunizations up to date: Yes


Hx Diphtheria, Pertussis, Tetanus Vaccination: Yes - 





Review of Systems





- Review of Systems


Constitutional: No symptoms reported


EENT: No symptoms reported


Cardiovascular: No symptoms reported


Respiratory: No symptoms reported


Gastrointestinal: No symptoms reported


Genitourinary: No symptoms reported


Female Genitourinary: No symptoms reported


Musculoskeletal: See HPI, Joint pain - right knee only, Joint swelling, Muscle 

stiffness


Skin: No symptoms reported


Hematologic/Lymphatic: No symptoms reported


Neurological/Psychological: No symptoms reported





Physical Exam





- Vital signs


Vitals: 


                                        











Temp Pulse Resp BP Pulse Ox


 


 98.7 F   109 H  16   129/67 H  94 


 


 20 20:29  20 20:29  20 20:29  20 20:29  20 20:29











Interpretation: Tachycardic





- General


General appearance: Alert





- HEENT


Head: Normocephalic, Atraumatic


Eyes: Normal


Pupils: PERRL





- Respiratory


Respiratory status: No respiratory distress


Chest status: Nontender


Breath sounds: Normal


Chest palpation: Normal





- Cardiovascular


Rhythm: Regular


Heart sounds: Normal auscultation


Murmur: No





- Abdominal


Inspection: Normal


Distension: No distension


Bowel sounds: Normal


Tenderness: Nontender


Organomegaly: No organomegaly





- Rectal


Hemorrhoids: Other - deferred





- Genitourinary


Speculum exam: Other - deferred





- Back


Back: Normal





- Extremities


General upper extremity: Normal inspection


General lower extremity: Tender - right knee





- Neurological


Neuro grossly intact: Yes


Cognition: Normal


Orientation: AAOx4


Melvin Coma Scale Eye Opening: Spontaneous


Valentina Coma Scale Verbal: Oriented


Valentina Coma Scale Motor: Obeys Commands


Melvin Coma Scale Total: 15


Speech: Normal


Motor strength normal: LUE, LLE.  No: RUE - RLE with pain on rom right knee;pos 

edema no cellulitis pos dp pulses pallavi, RLE


Sensory: Normal





- Psychological


Associated symptoms: Normal affect





- Skin


Skin Temperature: Warm


Skin Moisture: Dry





Course





- Vital Signs


Vital signs: 


                                        











Temp Pulse Resp BP Pulse Ox


 


 98.7 F   109 H  16   129/67 H  94 


 


 20 21:31  20 20:29  20 20:29  20 20:29  20 20:29














- Laboratory


Result Diagrams: 


                                 20 00:05





                                 20 00:05





- Diagnostic Test


Radiology reviewed: Reports reviewed





Critical Care Note





- Critical Care Note


Total time excluding time spent on procedures (mins): 90





Discharge





- Discharge


Clinical Impression: 


Knee pain, right


Qualifiers:


 Chronicity: acute Qualified Code(s): M25.561 - Pain in right knee





Condition: Good


Disposition: HOME, SELF-CARE


Additional Instructions: 


Follow-up with orthopedics Dr. Wilmer Greer.  Call his office tomorrow take 

medicines as directed encourage fluids


Prescriptions: 


Dexamethasone [Decadron 4 Mg Tablet] 4 mg PO DAILY #3 tablet


Lidocaine [Lidoderm 5% (700 mg) Transdermal Patch] 1 patch TP DAILY #30 

adh..patch


Referrals: 


RIKI ALAS DO [Primary Care Provider] - Follow up as needed

## 2020-06-18 ENCOUNTER — HOSPITAL ENCOUNTER (EMERGENCY)
Dept: HOSPITAL 62 - ER | Age: 55
LOS: 1 days | Discharge: LEFT BEFORE DISCHARGE | End: 2020-06-19
Payer: MEDICAID

## 2020-06-18 VITALS — DIASTOLIC BLOOD PRESSURE: 72 MMHG | SYSTOLIC BLOOD PRESSURE: 143 MMHG

## 2020-06-18 DIAGNOSIS — Z88.0: ICD-10-CM

## 2020-06-18 DIAGNOSIS — Z53.20: ICD-10-CM

## 2020-06-18 DIAGNOSIS — Z88.6: ICD-10-CM

## 2020-06-18 DIAGNOSIS — I10: ICD-10-CM

## 2020-06-18 DIAGNOSIS — Z88.8: ICD-10-CM

## 2020-06-18 DIAGNOSIS — L98.9: Primary | ICD-10-CM

## 2020-06-18 DIAGNOSIS — Z88.2: ICD-10-CM

## 2020-06-18 DIAGNOSIS — J45.909: ICD-10-CM

## 2020-06-18 DIAGNOSIS — E11.9: ICD-10-CM

## 2020-06-18 LAB
APPEARANCE UR: CLEAR
APTT PPP: COLORLESS S
BILIRUB UR QL STRIP: NEGATIVE
GLUCOSE UR STRIP-MCNC: >=500 MG/DL
KETONES UR STRIP-MCNC: NEGATIVE MG/DL
NITRITE UR QL STRIP: NEGATIVE
PH UR STRIP: 6 [PH] (ref 5–9)
PROT UR STRIP-MCNC: NEGATIVE MG/DL
SP GR UR STRIP: 1
UROBILINOGEN UR-MCNC: NEGATIVE MG/DL (ref ?–2)

## 2020-06-18 PROCEDURE — 81001 URINALYSIS AUTO W/SCOPE: CPT

## 2020-06-18 PROCEDURE — 99281 EMR DPT VST MAYX REQ PHY/QHP: CPT

## 2020-06-18 NOTE — ER DOCUMENT REPORT
ED Medical Screen (RME)





- General


Chief Complaint: Skin Problem


Stated Complaint: SKIN GROWTHS


Time Seen by Provider: 06/18/20 22:23


Primary Care Provider: 


RIKI ALAS DO [Primary Care Provider] - Follow up as needed


Mode of Arrival: Ambulatory


Information source: Patient


Notes: 





HPI; 55-year-old female presents to the emergency room with multiple lesions 

noted today.  States she has one on her left wrist, left axillary region and 

vaginal area.  Unsure how long she is on the ones in the vagina.  States the one

on her left wrist started draining some yellow pus earlier today.  She denies 

any fevers.  No history of previous abscesses.  No medications for symptoms.





PE: Alert and oriented x3.  Lungs clear to auscultation without rales, rhonchi, 

wheezes.  Heart regular rate rhythm without murmurs, rubs, gallops.  She has a 

1/2 cm lesion on her left dorsal wrist that is erythematous and tender to 

palpation no active discharge or draining noted.  Unable to do full exam in 

triage.








I have greeted and performed a rapid initial assessment of this patient.  A 

comprehensive ED assessment and evaluation of the patient, analysis of test 

results and completion of the medical decision making process will be conducted 

by additional ED providers.  I have specifically instructed the patient or 

family members with the patient to immediately return to any nursing staff 

should anything change in the patient's condition or with their chief complaint.


TRAVEL OUTSIDE OF THE [place] IN LAST 30 DAYS: No





- Related Data


Allergies/Adverse Reactions: 


                                        





tramadol Allergy (Intermediate, Verified 12/15/19 17:06)


   AMS


Penicillins Allergy (Mild, Verified 12/15/19 17:06)


   rash


Sulfa (Sulfonamide Antibiotics) Allergy (Mild, Verified 12/15/19 17:06)


   rash


aspirin Allergy (Verified 12/15/19 17:06)


   NO ASA











Past Medical History





- Past Medical History


Cardiac Medical History: Reports: Hx Atrial Fibrillation, Hx Congestive Heart 

Failure, Hx Hypercholesterolemia, Hx Hypertension


   Denies: Hx Coronary Artery Disease, Hx DVT, Hx Heart Attack, Hx Pulmonary 

Embolism


Pulmonary Medical History: Reports: Hx Asthma, Hx Bronchitis


   Denies: Hx COPD, Hx Pneumonia


Neurological Medical History: Reports: Hx Migraine.  Denies: Hx Cerebrovascular 

Accident, Hx Seizures


Endocrine Medical History: Reports: Hx Diabetes Mellitus Type 2, Hx 

Hypothyroidism.  Denies: Hx Diabetes Mellitus Type 1, Hx Hyperthyroidism


Renal/ Medical History: Denies: Hx Peritoneal Dialysis


GI Medical History: Reports: Hx Gastritis, Hx Gastroesophageal Reflux Disease.  

Denies: Hx Cirrhosis, Hx Crohn's Disease, Hx Hepatitis, Hx Ulcerative Colitis


Musculoskeltal Medical History: Reports Hx Arthritis, Denies Hx Gout, Reports Hx

Musculoskeletal Trauma


Skin Medical History: Denies Hx Eczema, Denies Hx Psoriasis


Psychiatric Medical History: Reports: Hx Anxiety, Hx Depression, Hx 

Schizoaffective Disorder, Hx Schizophrenia - schizo affective


Infectious Medical History: Denies: Hx Hepatitis


Past Surgical History: Reports: Hx Cholecystectomy, Hx Tubal Ligation.  Denies: 

Hx Hysterectomy





- Immunizations


Immunizations up to date: Yes


Hx Diphtheria, Pertussis, Tetanus Vaccination: Yes - 2013





Physical Exam





- Vital signs


Vitals: 





                                        











Temp Pulse Resp BP Pulse Ox


 


 97.6 F   60   16   143/72 H  99 


 


 06/18/20 21:20  06/18/20 21:20  06/18/20 21:20  06/18/20 21:20  06/18/20 21:20














Course





- Vital Signs


Vital signs: 





                                        











Temp Pulse Resp BP Pulse Ox


 


 97.6 F   60   16   143/72 H  99 


 


 06/18/20 21:20  06/18/20 21:20  06/18/20 21:20  06/18/20 21:20  06/18/20 21:20














Doctor's Discharge





- Discharge


Referrals: 


RIKI ALAS DO [Primary Care Provider] - Follow up as needed

## 2020-06-20 ENCOUNTER — HOSPITAL ENCOUNTER (INPATIENT)
Dept: HOSPITAL 62 - ER | Age: 55
LOS: 4 days | Discharge: HOME | DRG: 571 | End: 2020-06-24
Attending: FAMILY MEDICINE | Admitting: FAMILY MEDICINE
Payer: MEDICAID

## 2020-06-20 DIAGNOSIS — B95.1: ICD-10-CM

## 2020-06-20 DIAGNOSIS — E11.65: ICD-10-CM

## 2020-06-20 DIAGNOSIS — F25.9: ICD-10-CM

## 2020-06-20 DIAGNOSIS — Z90.49: ICD-10-CM

## 2020-06-20 DIAGNOSIS — Z20.828: ICD-10-CM

## 2020-06-20 DIAGNOSIS — B96.89: ICD-10-CM

## 2020-06-20 DIAGNOSIS — L02.211: Primary | ICD-10-CM

## 2020-06-20 DIAGNOSIS — Z82.3: ICD-10-CM

## 2020-06-20 DIAGNOSIS — N39.0: ICD-10-CM

## 2020-06-20 DIAGNOSIS — J45.909: ICD-10-CM

## 2020-06-20 DIAGNOSIS — D72.829: ICD-10-CM

## 2020-06-20 DIAGNOSIS — Z88.0: ICD-10-CM

## 2020-06-20 DIAGNOSIS — I11.0: ICD-10-CM

## 2020-06-20 DIAGNOSIS — K21.9: ICD-10-CM

## 2020-06-20 DIAGNOSIS — I50.9: ICD-10-CM

## 2020-06-20 DIAGNOSIS — L02.415: ICD-10-CM

## 2020-06-20 DIAGNOSIS — I48.91: ICD-10-CM

## 2020-06-20 DIAGNOSIS — M19.90: ICD-10-CM

## 2020-06-20 DIAGNOSIS — E66.01: ICD-10-CM

## 2020-06-20 DIAGNOSIS — L30.4: ICD-10-CM

## 2020-06-20 DIAGNOSIS — L02.414: ICD-10-CM

## 2020-06-20 DIAGNOSIS — B95.62: ICD-10-CM

## 2020-06-20 DIAGNOSIS — Z88.2: ICD-10-CM

## 2020-06-20 DIAGNOSIS — Z91.19: ICD-10-CM

## 2020-06-20 DIAGNOSIS — E87.1: ICD-10-CM

## 2020-06-20 DIAGNOSIS — L02.412: ICD-10-CM

## 2020-06-20 DIAGNOSIS — E78.5: ICD-10-CM

## 2020-06-20 DIAGNOSIS — Z88.8: ICD-10-CM

## 2020-06-20 DIAGNOSIS — L02.214: ICD-10-CM

## 2020-06-20 DIAGNOSIS — Z79.01: ICD-10-CM

## 2020-06-20 DIAGNOSIS — N17.9: ICD-10-CM

## 2020-06-20 DIAGNOSIS — L03.311: ICD-10-CM

## 2020-06-20 DIAGNOSIS — Z79.899: ICD-10-CM

## 2020-06-20 DIAGNOSIS — Z82.49: ICD-10-CM

## 2020-06-20 DIAGNOSIS — Z83.3: ICD-10-CM

## 2020-06-20 LAB
ADD MANUAL DIFF: NO
ALBUMIN SERPL-MCNC: 3.6 G/DL (ref 3.5–5)
ALP SERPL-CCNC: 167 U/L (ref 38–126)
ANION GAP SERPL CALC-SCNC: 11 MMOL/L (ref 5–19)
APPEARANCE UR: (no result)
APTT PPP: YELLOW S
AST SERPL-CCNC: 23 U/L (ref 14–36)
BASOPHILS # BLD AUTO: 0 10^3/UL (ref 0–0.2)
BASOPHILS NFR BLD AUTO: 0.2 % (ref 0–2)
BILIRUB DIRECT SERPL-MCNC: 0.1 MG/DL (ref 0–0.4)
BILIRUB SERPL-MCNC: 1.1 MG/DL (ref 0.2–1.3)
BILIRUB UR QL STRIP: NEGATIVE
BUN SERPL-MCNC: 30 MG/DL (ref 7–20)
CALCIUM: 9.2 MG/DL (ref 8.4–10.2)
CHLORIDE SERPL-SCNC: 95 MMOL/L (ref 98–107)
CO2 SERPL-SCNC: 24 MMOL/L (ref 22–30)
EOSINOPHIL # BLD AUTO: 0.1 10^3/UL (ref 0–0.6)
EOSINOPHIL NFR BLD AUTO: 0.9 % (ref 0–6)
ERYTHROCYTE [DISTWIDTH] IN BLOOD BY AUTOMATED COUNT: 14.4 % (ref 11.5–14)
GLUCOSE SERPL-MCNC: 449 MG/DL (ref 75–110)
GLUCOSE UR STRIP-MCNC: >=500 MG/DL
HCT VFR BLD CALC: 41.9 % (ref 36–47)
HGB BLD-MCNC: 13.4 G/DL (ref 12–15.5)
KETONES UR STRIP-MCNC: NEGATIVE MG/DL
LYMPHOCYTES # BLD AUTO: 2.1 10^3/UL (ref 0.5–4.7)
LYMPHOCYTES NFR BLD AUTO: 15.9 % (ref 13–45)
MCH RBC QN AUTO: 31 PG (ref 27–33.4)
MCHC RBC AUTO-ENTMCNC: 32 G/DL (ref 32–36)
MCV RBC AUTO: 97 FL (ref 80–97)
MONOCYTES # BLD AUTO: 0.6 10^3/UL (ref 0.1–1.4)
MONOCYTES NFR BLD AUTO: 4.3 % (ref 3–13)
NEUTROPHILS # BLD AUTO: 10.5 10^3/UL (ref 1.7–8.2)
NEUTS SEG NFR BLD AUTO: 78.7 % (ref 42–78)
NITRITE UR QL STRIP: NEGATIVE
PH UR STRIP: 5 [PH] (ref 5–9)
PLATELET # BLD: 352 10^3/UL (ref 150–450)
POTASSIUM SERPL-SCNC: 4.2 MMOL/L (ref 3.6–5)
PROT SERPL-MCNC: 6.3 G/DL (ref 6.3–8.2)
PROT UR STRIP-MCNC: NEGATIVE MG/DL
RBC # BLD AUTO: 4.33 10^6/UL (ref 3.72–5.28)
SP GR UR STRIP: 1.01
TOTAL CELLS COUNTED % (AUTO): 100 %
UROBILINOGEN UR-MCNC: NEGATIVE MG/DL (ref ?–2)
WBC # BLD AUTO: 13.3 10^3/UL (ref 4–10.5)

## 2020-06-20 PROCEDURE — 87077 CULTURE AEROBIC IDENTIFY: CPT

## 2020-06-20 PROCEDURE — 93005 ELECTROCARDIOGRAM TRACING: CPT

## 2020-06-20 PROCEDURE — 85027 COMPLETE CBC AUTOMATED: CPT

## 2020-06-20 PROCEDURE — 87070 CULTURE OTHR SPECIMN AEROBIC: CPT

## 2020-06-20 PROCEDURE — G0378 HOSPITAL OBSERVATION PER HR: HCPCS

## 2020-06-20 PROCEDURE — 84443 ASSAY THYROID STIM HORMONE: CPT

## 2020-06-20 PROCEDURE — 96365 THER/PROPH/DIAG IV INF INIT: CPT

## 2020-06-20 PROCEDURE — 99140 ANES COMP EMERGENCY COND: CPT

## 2020-06-20 PROCEDURE — 87635 SARS-COV-2 COVID-19 AMP PRB: CPT

## 2020-06-20 PROCEDURE — 87205 SMEAR GRAM STAIN: CPT

## 2020-06-20 PROCEDURE — 93010 ELECTROCARDIOGRAM REPORT: CPT

## 2020-06-20 PROCEDURE — 83735 ASSAY OF MAGNESIUM: CPT

## 2020-06-20 PROCEDURE — 81001 URINALYSIS AUTO W/SCOPE: CPT

## 2020-06-20 PROCEDURE — 83036 HEMOGLOBIN GLYCOSYLATED A1C: CPT

## 2020-06-20 PROCEDURE — 36415 COLL VENOUS BLD VENIPUNCTURE: CPT

## 2020-06-20 PROCEDURE — C9803 HOPD COVID-19 SPEC COLLECT: HCPCS

## 2020-06-20 PROCEDURE — 87150 DNA/RNA AMPLIFIED PROBE: CPT

## 2020-06-20 PROCEDURE — 82962 GLUCOSE BLOOD TEST: CPT

## 2020-06-20 PROCEDURE — 74176 CT ABD & PELVIS W/O CONTRAST: CPT

## 2020-06-20 PROCEDURE — 87186 SC STD MICRODIL/AGAR DIL: CPT

## 2020-06-20 PROCEDURE — 80048 BASIC METABOLIC PNL TOTAL CA: CPT

## 2020-06-20 PROCEDURE — 85025 COMPLETE CBC W/AUTO DIFF WBC: CPT

## 2020-06-20 PROCEDURE — 99284 EMERGENCY DEPT VISIT MOD MDM: CPT

## 2020-06-20 PROCEDURE — 80061 LIPID PANEL: CPT

## 2020-06-20 PROCEDURE — 80053 COMPREHEN METABOLIC PANEL: CPT

## 2020-06-20 PROCEDURE — 84484 ASSAY OF TROPONIN QUANT: CPT

## 2020-06-20 PROCEDURE — 87075 CULTR BACTERIA EXCEPT BLOOD: CPT

## 2020-06-20 PROCEDURE — 87040 BLOOD CULTURE FOR BACTERIA: CPT

## 2020-06-20 PROCEDURE — 96361 HYDRATE IV INFUSION ADD-ON: CPT

## 2020-06-20 RX ADMIN — Medication PRN MG: at 23:40

## 2020-06-20 RX ADMIN — LINEZOLID SCH: 600 TABLET, FILM COATED ORAL at 23:01

## 2020-06-20 RX ADMIN — SODIUM CHLORIDE, SODIUM LACTATE, POTASSIUM CHLORIDE, AND CALCIUM CHLORIDE PRN MLS/HR: .6; .31; .03; .02 INJECTION, SOLUTION INTRAVENOUS at 22:29

## 2020-06-20 RX ADMIN — INSULIN HUMAN SCH UNIT: 100 INJECTION, SOLUTION PARENTERAL at 22:32

## 2020-06-20 RX ADMIN — Medication SCH: at 22:32

## 2020-06-20 RX ADMIN — MORPHINE SULFATE PRN MG: 10 INJECTION INTRAMUSCULAR; INTRAVENOUS; SUBCUTANEOUS at 23:28

## 2020-06-20 NOTE — ER DOCUMENT REPORT
ED Skin Rash/Insect Bite/Abscs





- General


Chief Complaint: Skin Problem


Stated Complaint: SKIN SORES


Time Seen by Provider: 06/20/20 15:15


Primary Care Provider: 


RIKI ALAS DO [Primary Care Provider] - Follow up as needed


Notes: 





This 55-year-old woman presents to the emergency department with complaint of 

areas of skin lesions which have become more painful and swollen.  She has an 

area on the left lower abdominal abdominal wall fold with a center which is firm

and indurated and a surrounding large area of redness and tenderness.  She also 

has an open lesion on the right medial thigh which is firm and draining purulent

material.  She also has erythema and irritation in the inguinal area with 

redness which extends into the medial thigh and onto the labia bilaterally.  She

is a diabetic, denies fever, has been poorly compliant with medications.  She 

states that she had been on metformin at one point but it caused some problems 

with her kidneys.  She is not sure which medication she is taking at this time.


TRAVEL OUTSIDE OF THE [place] IN LAST 30 DAYS: No





- Related Data


Allergies/Adverse Reactions: 


                                        





tramadol Allergy (Intermediate, Verified 12/15/19 17:06)


   AMS


Penicillins Allergy (Mild, Verified 12/15/19 17:06)


   rash


Sulfa (Sulfonamide Antibiotics) Allergy (Mild, Verified 12/15/19 17:06)


   rash


aspirin Allergy (Verified 12/15/19 17:06)


   NO ASA











Past Medical History





- Social History


Smoking Status: Former Smoker


Family History: CAD, CVA, DM, Hyperlipidemia, Hypertension, Malignancy, Thyroid 

Disfunction, Other - Asthma





- Past Medical History


Cardiac Medical History: Reports: Hx Atrial Fibrillation, Hx Congestive Heart 

Failure, Hx Hypercholesterolemia, Hx Hypertension


   Denies: Hx Coronary Artery Disease, Hx DVT, Hx Heart Attack, Hx Pulmonary 

Embolism


Pulmonary Medical History: Reports: Hx Asthma, Hx Bronchitis


   Denies: Hx COPD, Hx Pneumonia


Neurological Medical History: Reports: Hx Migraine.  Denies: Hx Cerebrovascular 

Accident, Hx Seizures


Endocrine Medical History: Reports: Hx Diabetes Mellitus Type 2, Hx Hypothyro

idism.  Denies: Hx Diabetes Mellitus Type 1, Hx Hyperthyroidism


Renal/ Medical History: Denies: Hx Peritoneal Dialysis


GI Medical History: Reports: Hx Gastritis, Hx Gastroesophageal Reflux Disease.  

Denies: Hx Cirrhosis, Hx Crohn's Disease, Hx Hepatitis, Hx Ulcerative Colitis


Musculoskeletal Medical History: Reports Hx Arthritis, Denies Hx Gout, Reports 

Hx Musculoskeletal Trauma


Skin Medical History: Denies Hx Eczema, Denies Hx Psoriasis


Psychiatric Medical History: Reports: Hx Anxiety, Hx Depression, Hx 

Schizoaffective Disorder, Hx Schizophrenia - schizo affective


Infectious Medical History: Denies: Hx Hepatitis


Past Surgical History: Reports: Hx Cholecystectomy, Hx Tubal Ligation.  Denies: 

Hx Hysterectomy





- Immunizations


Immunizations up to date: Yes


Hx Diphtheria, Pertussis, Tetanus Vaccination: Yes - 2013





Review of Systems





- Review of Systems


Notes: 





Constitutional: Negative for fever.


HENT: Negative for sore throat.


Eyes: Negative for visual changes.


Cardiovascular: Negative for chest pain.


Respiratory: Negative for shortness of breath.


Gastrointestinal: Negative for abdominal pain, vomiting or diarrhea.


Genitourinary: Negative for dysuria.


Musculoskeletal: Negative for back pain.


Skin: + Skin sores


Neurological: Negative for headaches, weakness or numbness.





10 point ROS negative except as marked above and in HPI.





Physical Exam





- Vital signs


Vitals: 


                                        











Temp Pulse Resp BP Pulse Ox


 


 98.5 F   92   16   102/48 L  95 


 


 06/20/20 15:12  06/20/20 15:12  06/20/20 15:12  06/20/20 15:12  06/20/20 15:12














- Notes


Notes: 





PHYSICAL EXAMINATION:


 


Physical Exam:


General: Well-nourished well-developed 55-year-old woman complaining of pain in 

the abdomen and right thigh. 


HEENT: NC/AT, pupils equal round and reactive to light, MM moist,nares clear, 

oropharynx clear, airway patent


Neck: supple, no adenopathy, no masses.  Good range of motion


Lungs: Cear, no wheezing, no rales no rhonchi


CVS: Regular rate and rhythm no murmur gallop or rub


Abdomen: Soft, active, nontender, no masses, no hepatosplenomegaly, left lower 

abdominal wall skin infection (see below)


Ext:   No edema, clubbing or cyanosis.


Neuro: Alert and responsive, moving all 4 extremities on command, cranial nerves

intact, no focal findings


Skin: Left lower abdominal wall with a 12 x 6 cm area of erythema with a central

area of firmness and tenderness.,  Right thigh with an open 3 x 3 cm area of 

firmness with purulent drainage noted.


PSYCH: Normal mood, normal affect.


 








Course





- Re-evaluation


Re-evalutation: 





06/20/20 18:49


An incision and drainage procedure was performed on the abdominal wall abscess, 

cultures were obtained and quarter-inch packing was placed in the wound site.  A

CT of the abdomen and pelvis noncontrast was performed.  Blood cultures x2, IV 

clindamycin, fluids x2 L, regular Humulin 10 units IV was given in the emergency

department.


06/20/20 19:30


I have discussed the patient with the hospitalist, Dr.Weiland, he has agreed to 

bring the patient in as an obvious for continued IV antibiotics and closer 

monitoring of her cellulitis.  Given the poor control of diabetes and poor 

compliance with medications it is felt to be in the patient's best interest to 

be treated with IV antibiotics and then switch to orals with clear education on 

treatment.





- Vital Signs


Vital signs: 


                                        











Temp Pulse Resp BP Pulse Ox


 


 98.5 F   92   16   102/48 L  95 


 


 06/20/20 15:12  06/20/20 15:12  06/20/20 15:12  06/20/20 15:12  06/20/20 15:12














- Laboratory


Result Diagrams: 


                                 06/20/20 15:35





                                 06/20/20 15:35


Laboratory results interpreted by me: 


                                        











  06/20/20 06/20/20 06/20/20





  15:35 15:35 16:29


 


WBC  13.3 H  


 


RDW  14.4 H  


 


Absolute Neuts (auto)  10.5 H  


 


Seg Neutrophils %  78.7 H  


 


Sodium   130.0 L 


 


Chloride   95 L 


 


BUN   30 H 


 


Creatinine   1.64 H 


 


Est GFR ( Amer)   40 L 


 


Est GFR (MDRD) Non-Af   33 L 


 


Glucose   449 H* 


 


Alkaline Phosphatase   167 H 


 


Urine Glucose (UA)    >=500 H


 


Urine Blood    LARGE H


 


Ur Leukocyte Esterase    MODERATE H











06/20/20 18:50


I have reviewed laboratory data and used this information for the treatment 

decisions regarding the patient.





- Diagnostic Test


Radiology reviewed: Image reviewed, Reports reviewed


Radiology results interpreted by me: 





06/20/20 19:33


CT abdomen and pelvis without contrast.  Left pannus soft tissue lesion with 

subcutaneous fat stranding and hyperdense material noted within the soft tissue 

area of inflammation without abscess.





Discharge





- Discharge


Clinical Impression: 


 Cutaneous abscess of abdominal wall, Cellulitis of left abdominal wall, Abscess

of right thigh, Intertrigo labialis, Poorly controlled diabetes mellitus, Non-

compliant behavior





Obesity


Qualifiers:


 Obesity type: unspecified obesity type Obesity classification: adult class 3 

(BMI >= 40) Serious obesity comorbidity presence: with serious comorbidity Body 

mass index: BMI 40.0-44.9 Qualified Code(s): E66.01 - Morbid (severe) obesity 

due to excess calories





UTI (urinary tract infection)


Qualifiers:


 Urinary tract infection type: site unspecified Hematuria presence: without 

hematuria Qualified Code(s): N39.0 - Urinary tract infection, site not specified





Condition: Good


Disposition: ADMITTED AS OBSERVATION


Admitting Provider: Weiland (Hospitalist)


Unit Admitted: Medical Floor


Referrals: 


RIKI ALAS DO [Primary Care Provider] - Follow up as needed

## 2020-06-20 NOTE — EKG REPORT
SEVERITY:- ABNORMAL ECG -

SINUS RHYTHM

PROBABLE LEFT ATRIAL ABNORMALITY

NONSPECIFIC ST-T CHANGES- INFERIOR LEADS

:

Confirmed by: Tawanda Ramirez MD 20-Jun-2020 18:51:13

## 2020-06-20 NOTE — PDOC H&P
History of Present Illness


Admission Date/PCP: 


06/20/2020   19:30  


RIKI ALAS DO


Patient complains of: Skin sores


History of Present Illness: 


EVERARDO GEORGE is a 55 year old female who presented to the emergency room with a

2-week history of "skin sores".  She admits to the gradual development of 3 

areas of "skin sores" over the last 2 weeks.  The first area erupted on her 

right thigh with redness being present constantly and gradually spreading 

becoming more tender and eventually opening to drain pus.  The second area began

on her left abdominal pannus region 1 week ago and has gradually increased in 

redness and tenderness.  The third area is in her gerardo-labial groin and has been

gradually developing over the last 2 days.  She denies associated or 

accompanying signs and symptoms.  She admits prior similar episodes of lesser 

degree.  She has not identified any aggravating or ameliorating factors for her 

"skin sores".  In the emergency room she was found to have a draining abscess of

the right thigh and a new abscess of the left abdominal pannus which was incised

and drained with packing placed.  Her groin was evaluated and no abscess or 

obvious cellulitis was found, however intertrigo was noted.  Patient's white 

blood count was 13,000 and her blood sugar was 449.  She was started on IV 

antibiotic therapy with clindamycin and treated with insulin.  She was 

subsequently admitted observation status for further evaluation and treatment.





Past Medical History


Cardiac Medical History: Reports: Atrial Fibrillation, Congestive Heart Failure,

Hyperlipidema, Hypertension


   Denies: Coronary Artery Disease, DVT, Myocardial Infarction, Pulmonary 

Embolism


Pulmonary Medical History: Reports: Asthma, Bronchitis


   Denies: Chronic Obstructive Pulmonary Disease (COPD), Pneumonia


EENT Medical History: 


   Denies: Cataracts, Ears - Hearing aids


Neurological Medical History: Reports: Migraine


   Denies: Seizures


Endocrine Medical History: Reports: Diabetes Mellitus Type 2 - Poorly compliant 

with medications and diet, Hypothyroidism, Obesity


   Denies: Diabetes Mellitus Type 1, Hyperthyroidism


Renal/ Medical History: Reports: Chronic Kidney Disease


   Denies: Nephrolithiasis


Malignancy Medical History: Reports: None


GI Medical History: Reports: Gastroesophageal Reflux Disease


   Denies: Cirrhosis, Crohn's Disease, Hepatitis, Peptic Ulcer Disease, 

Ulcerative Colitis


Musculoskeltal Medical History: Reports: Arthritis


   Denies: Gout


Skin Medical History: 


   Denies: Eczema, Psoriasis


Psychiatric Medical History: Reports: Depression, Schizoaffective Disorder


   Denies: Alcohol Dependency, Substance Abuse, Tobacco Dependency


Traumatic Medical History: Reports: None


Hematology: 


   Denies: Anemia, Bleeding Tendencies


Infectious Medical History: Reports: None





Past Surgical History


Past Surgical History: Reports: Cholecystectomy, Tubal Ligation





Social History


Information Source: Patient


Lives with: Spouse/Significant other


Smoking Status: Former Smoker


Electronic Cigarette use?: No


Frequency of Alcohol Use: None


Hx Recreational Drug Use: No


Drugs: None


Hx Prescription Drug Abuse: No





- Advance Directive


Resuscitation Status: Full Code


Surrogate healthcare decision maker:: 


Manish Rockwell





Family History


Family History: CAD, CVA, DM, Hyperlipidemia, Hypertension, Malignancy, Thyroid 

Disfunction, Other - Asthma


Parental Family History Reviewed: Yes


Children Family History Reviewed: No


Sibling(s) Family History Reviewed.: Yes





Medication/Allergy


Home Medications: 








Alprazolam [Xanax] 1 mg PO TIDP PRN MDD 4 MG 12/20/17 


Amitriptyline HCl [Elavil 25 mg Tablet] 50 mg PO QHS 12/20/17 


Apixaban [Eliquis 5 mg Tablet] 5 mg PO Q12 12/20/17 


Zolpidem Tartrate [Ambien] 10 mg PO QHS 12/20/17 


Brexpiprazole [Rexulti] 2 mg PO QHS 11/16/18 


Desvenlafaxine [Desvenlafaxine ER] 50 mg PO DAILY 11/16/18 


Solifenacin Succinate [Vesicare] 5 mg PO DAILY 11/16/18 


Fluticasone Propionate [Flovent Hfa] 1 puff IH Q12 03/22/20 


Fluticasone/Salmeterol [Advair 250-50 Diskus 14 Dose/Diskus] 1 inh IH Q12 

03/22/20 


Glipizide [Glipizide Xl] 10 mg PO [id number] 03/22/20 


Oxycodone HCl/Acetaminophen [Percocet 5-325 mg Tablet] 1 tab PO Q8HP PRN 

03/22/20 


Prazosin HCl [Minipress] 2 mg PO QHS 03/22/20 


Sitagliptin Phosphate [Januvia 50 mg Tablet] 100 mg PO DAILY 03/22/20 


Tizanidine HCl 4 mg PO Q8 03/22/20 


Furosemide [Lasix 40 mg Tablet] 40 mg PO DAILY #30 tablet 03/31/20 


Levothyroxine Sodium [Synthroid 0.15 mg Tablet] 0.15 mg PO DAILY #30 tablet 

03/31/20 


Magnesium Oxide [Mag-Ox 400 mg Tablet] 400 mg PO DAILY 15 Days  tablet 03/31/20 


Metoprolol Succinate [Toprol Xl 25 mg Tab.sr] 50 mg PO Q12 30 Days 03/31/20 


Levofloxacin [Levaquin 750 mg Tablet] 750 mg PO DAILY #4 tablet 05/13/20 


Dexamethasone [Decadron 4 Mg Tablet] 4 mg PO DAILY #3 tablet 06/04/20 


Lidocaine [Lidoderm 5% (700 mg) Transdermal Patch] 1 patch TP DAILY #30 

adh..patch 06/04/20 








Allergies/Adverse Reactions: 


                                        





tramadol Allergy (Intermediate, Verified 12/15/19 17:06)


   AMS


Penicillins Allergy (Mild, Verified 12/15/19 17:06)


   rash


Sulfa (Sulfonamide Antibiotics) Allergy (Mild, Verified 12/15/19 17:06)


   rash


aspirin Allergy (Verified 12/15/19 17:06)


   NO ASA











Review of Systems


Constitutional: ABSENT: chills, fever(s)


Eyes: ABSENT: visual disturbances, other - Eye pain


Ears: ABSENT: hearing changes, other - Ear pain


Nose, Mouth, and Throat: ABSENT: headache(s), sore throat


Cardiovascular: ABSENT: chest pain, palpitations


Respiratory: ABSENT: cough, dyspnea


Gastrointestinal: ABSENT: abdominal pain, constipation, diarrhea, nausea, 

vomiting


Genitourinary: ABSENT: dysuria, hematuria


Musculoskeletal: ABSENT: back pain, joint swelling


Integumentary: PRESENT: as per HPI, erythema, wounds.  ABSENT: pruritus, rash


Neurological: PRESENT: other - Feels off balance or unsteady with walking at 

times.  ABSENT: confusion, convulsions, focal weakness, memory loss, syncope


Psychiatric: ABSENT: anxiety, depression


Endocrine: ABSENT: cold intolerance, heat intolerance, polydipsia, polyphagia, 

polyuria


Hematologic/Lymphatic: ABSENT: easy bleeding, easy bruising


Allergic/Immunologic: ABSENT: seasonal rhinorrhea





Physical Exam


Vital Signs: 


                                        











Temp Pulse Resp BP Pulse Ox


 


 98.5 F   92   16   102/48 L  95 


 


 06/20/20 15:12  06/20/20 15:12  06/20/20 15:12  06/20/20 15:12  06/20/20 15:12








                                 Intake & Output











 06/18/20 06/19/20 06/20/20





 23:59 23:59 23:59


 


Intake Total   1000


 


Balance   1000


 


Weight   113.398 kg











General appearance: PRESENT: cooperative, mild distress - Secondary to pain in 

her left abdominal pannus, morbidly obese


Head exam: PRESENT: atraumatic, normocephalic


Eye exam: PRESENT: conjunctiva pink.  ABSENT: conjunctival injection, scleral 

icterus


Ear exam: PRESENT: normal external ear exam.  ABSENT: bleeding, drainage


Mouth exam: PRESENT: dry mucosa, neck supple


Neck exam: ABSENT: thyromegaly, tracheal deviation


Respiratory exam: PRESENT: clear to auscultation pallavi, symmetrical, unlabored


Cardiovascular exam: PRESENT: RRR.  ABSENT: clicks, gallop, rubs


Pulses: PRESENT: normal radial pulses, normal dorsalis pedis pul


Vascular exam: PRESENT: normal capillary refill.  ABSENT: pallor


GI/Abdominal exam: PRESENT: normal bowel sounds, soft, tenderness - Moderate 

tenderness in the left lower abdomen superficial area of the pannus at site of 

cellulitis with abscess and recent incision and drainage with packing and 

dressing in place.


Torso Front/Back Image: 


                            __________________________














                            __________________________





 1 - Cellulitis with abscess





 2 - Cellulitis with purulent drainage





Rectal exam: PRESENT: deferred


Extremities exam: ABSENT: joint swelling, pedal edema


Musculoskeletal exam: ABSENT: deformity, dislocation


Neurological exam: PRESENT: alert, oriented to person, oriented to place, 

oriented to time, oriented to situation, CN II-XII grossly intact.  ABSENT: 

motor sensory deficit


Psychiatric exam: PRESENT: appropriate affect, normal mood


Skin exam: PRESENT: dry, erythema - Erythema and edema with induration noted on 

the left abdominal pannus with a 8 x 15 cm area of erythema noted.  Erythema and

edema with mild induration of the right medial thigh with a 5 x 5 cm area of 

erythema., rash - There is an erythematous intertriginous rash in the bilateral 

groins involving the external genitalia consistent with intertrigo., warm.  

ABSENT: jaundice, urticaria





Results


Laboratory Results: 


                                        





                                 06/20/20 15:35 





                                 06/20/20 15:35 





                                        











  06/20/20 06/20/20 06/20/20





  15:35 15:35 16:29


 


WBC  13.3 H  


 


RBC  4.33  


 


Hgb  13.4  


 


Hct  41.9  


 


MCV  97  


 


MCH  31.0  


 


MCHC  32.0  


 


RDW  14.4 H  


 


Plt Count  352  


 


Seg Neutrophils %  78.7 H  


 


Sodium   130.0 L 


 


Potassium   4.2 


 


Chloride   95 L 


 


Carbon Dioxide   24 


 


Anion Gap   11 


 


BUN   30 H 


 


Creatinine   1.64 H 


 


Est GFR ( Amer)   40 L 


 


Glucose   449 H* 


 


Calcium   9.2 


 


Total Bilirubin   1.1 


 


AST   23 


 


Alkaline Phosphatase   167 H 


 


Total Protein   6.3 


 


Albumin   3.6 


 


Urine Color    YELLOW


 


Urine Appearance    SLIGHTLY-CLOUDY


 


Urine pH    5.0


 


Ur Specific Gravity    1.014


 


Urine Protein    NEGATIVE


 


Urine Glucose (UA)    >=500 H


 


Urine Ketones    NEGATIVE


 


Urine Blood    LARGE H


 


Urine Nitrite    NEGATIVE


 


Ur Leukocyte Esterase    MODERATE H


 


Urine WBC (Auto)    3


 


Urine RBC (Auto)    10








                                        











  06/20/20





  15:35


 


Troponin I  < 0.012











Impressions: 


                                        





Abdomen/Pelvis CT  06/20/20 18:43


IMPRESSION:  Left pannus soft tissue injury with nonspecific radiodense material

seen at the defect site.  Surrounding inflammatory changes without abscess.  

Chronic and incidental intra-abdominal findings as above.


 














Assessment and Plan





- Diagnosis


(1) Acute kidney injury (nontraumatic)


Is this a current diagnosis for this admission?: Yes   





(2) Cutaneous abscess of abdominal wall


Is this a current diagnosis for this admission?: Yes   





(3) Abscess of right thigh


Is this a current diagnosis for this admission?: Yes   





(4) Intertrigo labialis


Is this a current diagnosis for this admission?: Yes   





(5) Leukocytosis, unspecified


Qualifiers: 


   Leukocytosis type: unspecified   Qualified Code(s): D72.829 - Elevated white 

blood cell count, unspecified   


Is this a current diagnosis for this admission?: Yes   





(6) Diabetes mellitus type 2 in obese


Is this a current diagnosis for this admission?: Yes   





(7) Morbid obesity with BMI of 40.0-44.9, adult


Is this a current diagnosis for this admission?: Yes   





(8) Hypertension


Qualifiers: 


   Hypertension type: essential hypertension   Qualified Code(s): I10 - 

Essential (primary) hypertension   


Is this a current diagnosis for this admission?: Yes   





(9) Non-compliant behavior


Is this a current diagnosis for this admission?: Yes   





(10) Schizoaffective disorder


Qualifiers: 


   Schizoaffective disorder type: unspecified   Qualified Code(s): F25.9 - 

Schizoaffective disorder, unspecified   


Is this a current diagnosis for this admission?: Yes   





- Plan Summary


Summary: 


Patient is admitted to observation status on the medical floor where she will 

receive routine supportive and symptomatic cares.  She will be treated with oral

Levaquin and Zyvox.  An initial dose of intravenous insulin will be given to 

normalize her blood sugar.  She will receive IV fluids using lactated Ringer's 

at 250 mL/h x 12 hours.  Before meals and at bedtime Accu-Cheks will be 

performed and sliding scale insulin will be used for hyperglycemia with a 

hypoglycemic protocol in place.  She will use morphine sulfate 2 to 4 mg IV 

every 2 hours as needed for pain.  She will use Ativan 1 mg IV every 4 hours as 

needed for anxiety or restlessness.  She will be placed on a cardiac and 

diabetic restricted diet.  A CBC, metabolic profile, magnesium level, hemoglobin

A1c, thyroid profile, BNP and lipid profile will be obtained in the morning.  

The patient's home medications will be resumed, as appropriate, once her 

medication list has been verified and reconciled.





- Time


Time Spent with patient: 15-24 minutes


Medications reviewed and adjusted accordingly: Yes


Anticipated discharge: Home with Homehealth


Within: within 36 hours





- Inpatient Certification


Based on my medical assessment, after consideration of the patient's 

comorbidities, presenting symptoms, or acuity I expect that the services needed 

warrant INPATIENT care.: No


I certify that my determination is in accordance with my understanding of 

Medicare's requirements for reasonable and necessary INPATIENT services [42 CFR 

412.3e].: No

## 2020-06-20 NOTE — RADIOLOGY REPORT (SQ)
EXAM DESCRIPTION:  CT ABD/PELVIS NO ORAL OR IV



IMAGES COMPLETED DATE/TIME:  6/20/2020 6:57 pm



REASON FOR STUDY:  abscess inguinal/abdominal wall



COMPARISON:  12/15/2019



TECHNIQUE:  CT scan of the abdomen and pelvis performed without intravenous or oral contrast. Images 
reviewed with lung, soft tissue, and bone windows. Reconstructed coronal and sagittal MPR images revi
ewed. All images stored on PACS.

All CT scanners at this facility use dose modulation, iterative reconstruction, and/or weight based d
osing when appropriate to reduce radiation dose to as low as reasonably achievable (ALARA).

[facility]: Dose Right  [facility]: CareDose    [facility]: Dose Right    [facility]: Teradose 4D    [facility]: Smart Project 10K



RADIATION DOSE:  CT Rad equipment meets quality standard of care and radiation dose reduction techniq
ues were employed. CTDIvol: 18.3 mGy. DLP: 1044 mGy-cm.mGy.



LIMITATIONS:  None.



FINDINGS:  LOWER CHEST: No significant findings. No nodules or infiltrates.

NON-CONTRASTED LIVER, SPLEEN, ADRENALS: Evaluation limited by lack of IV contrast. No identified sign
ificant masses.

PANCREAS: Fatty atrophy of the pancreatic head.  No masses.  No peripancreatic inflammatory changes.

GALLBLADDER: Surgically absent.

RIGHT KIDNEY AND URETER: Ectopic position within the pelvis.  No suspicious masses. Assessment limite
d by lack of IV contrast.   No significant calcifications.   No hydronephrosis or hydroureter.

LEFT KIDNEY AND URETER: No suspicious masses. Assessment limited by lack of IV contrast.   No signifi
cant calcifications.   No hydronephrosis or hydroureter.

AORTA AND RETROPERITONEUM: No aneurysm. No retroperitoneal masses or adenopathy.

BOWEL AND PERITONEAL CAVITY: No obvious masses or inflammatory changes. No free fluid.

APPENDIX: Normal.

PELVIS, BLADDER, AND ABDOMINAL WALL:No abnormal masses. No free fluid. Bladder normal.

BONES: No significant findings.

OTHER: A small soft tissue defect is seen of the left pannus noting hyperdense material at the defect
 which is of uncertain etiology or significance, may represent hyperdense ointment or other wound galina
atment.  Subcutaneous fat stranding is seen associated with this finding.  There is no focal fluid co
llection or abscess.



IMPRESSION:  Left pannus soft tissue injury with nonspecific radiodense material seen at the defect s
ite.  Surrounding inflammatory changes without abscess.  Chronic and incidental intra-abdominal findi
ngs as above.



COMMENT:  Quality ID # [id number]: Final reports with documentation of one or more dose reduction techniques
 (e.g., Automated exposure control, adjustment of the mA and/or kV according to patient size, use of 
iterative reconstruction technique)



TECHNICAL DOCUMENTATION:  JOB ID:  [id number]

 2011 [employer]- All Rights Reserved



Reading location - IP/workstation name: [email or web address]

## 2020-06-20 NOTE — ER DOCUMENT REPORT
ED Medical Screen (RME)





- General


Chief Complaint: Skin Problem


Stated Complaint: SKIN SORES


Time Seen by Provider: 06/20/20 15:15


Primary Care Provider: 


RIKI ALAS DO [Primary Care Provider] - Follow up as needed


Notes: 





Patient is a 55-year-old female who presents emergency department with a chief 

complaint of abscesses.  Patient reports a couple days ago developing an abscess

to the abdomen, left forearm as well as underneath her left arm.  Patient denies

a history of MRSA.  Patient reports she is a diabetic but is not currently 

checking her blood sugars at home and is not currently on any medication.  Sammy pan reports over the past few days she is also felt very unsteady when she 

walks and off balance.  Patient denies fall or head injury.


TRAVEL OUTSIDE OF THE [place] IN LAST 30 DAYS: No





- Related Data


Allergies/Adverse Reactions: 


                                        





tramadol Allergy (Intermediate, Verified 12/15/19 17:06)


   AMS


Penicillins Allergy (Mild, Verified 12/15/19 17:06)


   rash


Sulfa (Sulfonamide Antibiotics) Allergy (Mild, Verified 12/15/19 17:06)


   rash


aspirin Allergy (Verified 12/15/19 17:06)


   NO ASA











Past Medical History





- Past Medical History


Cardiac Medical History: Reports: Hx Atrial Fibrillation, Hx Congestive Heart 

Failure, Hx Hypercholesterolemia, Hx Hypertension


   Denies: Hx Coronary Artery Disease, Hx DVT, Hx Heart Attack, Hx Pulmonary 

Embolism


Pulmonary Medical History: Reports: Hx Asthma, Hx Bronchitis


   Denies: Hx COPD, Hx Pneumonia


Neurological Medical History: Reports: Hx Migraine.  Denies: Hx Cerebrovascular 

Accident, Hx Seizures


Endocrine Medical History: Reports: Hx Diabetes Mellitus Type 2, Hx 

Hypothyroidism.  Denies: Hx Diabetes Mellitus Type 1, Hx Hyperthyroidism


Renal/ Medical History: Denies: Hx Peritoneal Dialysis


GI Medical History: Reports: Hx Gastritis, Hx Gastroesophageal Reflux Disease.  

Denies: Hx Cirrhosis, Hx Crohn's Disease, Hx Hepatitis, Hx Ulcerative Colitis


Musculoskeltal Medical History: Reports Hx Arthritis, Denies Hx Gout, Reports Hx

Musculoskeletal Trauma


Skin Medical History: Denies Hx Eczema, Denies Hx Psoriasis


Psychiatric Medical History: Reports: Hx Anxiety, Hx Depression, Hx 

Schizoaffective Disorder, Hx Schizophrenia - schizo affective


Infectious Medical History: Denies: Hx Hepatitis


Past Surgical History: Reports: Hx Cholecystectomy, Hx Tubal Ligation.  Denies: 

Hx Hysterectomy





- Immunizations


Immunizations up to date: Yes


Hx Diphtheria, Pertussis, Tetanus Vaccination: Yes - 2013





Physical Exam





- Vital signs


Vitals: 





                                        











Temp Pulse Resp BP Pulse Ox


 


 98.5 F   92   16   102/48 L  95 


 


 06/20/20 15:12  06/20/20 15:12  06/20/20 15:12  06/20/20 15:12  06/20/20 15:12














Course





- Re-evaluation


Re-evalutation: 





06/20/20 15:29


Patient has an abscess to the abdomen and underneath the left axilla.  The left 

axilla will require incision and drainage.  Will obtain basic labs as well as a 

urinalysis as the patient reports feeling off balance over the past few days.  

This will include a glucose as the patient reports being diabetic but not 

currently checking or taking medication.





I have greeted and performed a rapid initial assessment of this patient.  A 

comprehensive ED assessment and evaluation of the patient, analysis of test 

results and completion of the medical decision making process will be conducted 

by additional ED providers.





- Vital Signs


Vital signs: 





                                        











Temp Pulse Resp BP Pulse Ox


 


 98.5 F   92   16   102/48 L  95 


 


 06/20/20 15:12  06/20/20 15:12  06/20/20 15:12  06/20/20 15:12  06/20/20 15:12














Doctor's Discharge





- Discharge


Referrals: 


RIKI ALAS DO [Primary Care Provider] - Follow up as needed

## 2020-06-21 LAB
ANION GAP SERPL CALC-SCNC: 7 MMOL/L (ref 5–19)
BUN SERPL-MCNC: 22 MG/DL (ref 7–20)
CALCIUM: 8.9 MG/DL (ref 8.4–10.2)
CHLORIDE SERPL-SCNC: 103 MMOL/L (ref 98–107)
CHOLEST SERPL-MCNC: 183.45 MG/DL (ref 0–200)
CO2 SERPL-SCNC: 23 MMOL/L (ref 22–30)
ERYTHROCYTE [DISTWIDTH] IN BLOOD BY AUTOMATED COUNT: 14.2 % (ref 11.5–14)
GLUCOSE SERPL-MCNC: 186 MG/DL (ref 75–110)
HCT VFR BLD CALC: 37.1 % (ref 36–47)
HGB BLD-MCNC: 12.2 G/DL (ref 12–15.5)
LDLC SERPL DIRECT ASSAY-MCNC: 104 MG/DL (ref ?–100)
MCH RBC QN AUTO: 31 PG (ref 27–33.4)
MCHC RBC AUTO-ENTMCNC: 32.8 G/DL (ref 32–36)
MCV RBC AUTO: 95 FL (ref 80–97)
PLATELET # BLD: 277 10^3/UL (ref 150–450)
POTASSIUM SERPL-SCNC: 4.4 MMOL/L (ref 3.6–5)
RBC # BLD AUTO: 3.92 10^6/UL (ref 3.72–5.28)
TRIGL SERPL-MCNC: 175 MG/DL (ref ?–150)
VLDLC SERPL CALC-MCNC: 35 MG/DL (ref 10–31)
WBC # BLD AUTO: 12 10^3/UL (ref 4–10.5)

## 2020-06-21 PROCEDURE — 0JBL0ZZ EXCISION OF RIGHT UPPER LEG SUBCUTANEOUS TISSUE AND FASCIA, OPEN APPROACH: ICD-10-PCS | Performed by: SURGERY

## 2020-06-21 PROCEDURE — 0JBF0ZZ EXCISION OF LEFT UPPER ARM SUBCUTANEOUS TISSUE AND FASCIA, OPEN APPROACH: ICD-10-PCS | Performed by: SURGERY

## 2020-06-21 PROCEDURE — 0KDL0ZZ EXTRACTION OF LEFT ABDOMEN MUSCLE, OPEN APPROACH: ICD-10-PCS | Performed by: SURGERY

## 2020-06-21 RX ADMIN — SODIUM CHLORIDE, SODIUM LACTATE, POTASSIUM CHLORIDE, AND CALCIUM CHLORIDE PRN MLS/HR: .6; .31; .03; .02 INJECTION, SOLUTION INTRAVENOUS at 22:10

## 2020-06-21 RX ADMIN — LEVOFLOXACIN SCH MG: 750 TABLET, FILM COATED ORAL at 22:10

## 2020-06-21 RX ADMIN — DOCUSATE SODIUM SCH: 100 CAPSULE, LIQUID FILLED ORAL at 17:32

## 2020-06-21 RX ADMIN — NYSTATIN AND TRIAMCINOLONE ACETONIDE SCH APPLIC: 100000; 1 OINTMENT TOPICAL at 10:18

## 2020-06-21 RX ADMIN — SODIUM CHLORIDE, SODIUM LACTATE, POTASSIUM CHLORIDE, AND CALCIUM CHLORIDE PRN MLS/HR: .6; .31; .03; .02 INJECTION, SOLUTION INTRAVENOUS at 10:14

## 2020-06-21 RX ADMIN — PANTOPRAZOLE SODIUM SCH MG: 40 TABLET, DELAYED RELEASE ORAL at 06:06

## 2020-06-21 RX ADMIN — MORPHINE SULFATE PRN MG: 10 INJECTION INTRAMUSCULAR; INTRAVENOUS; SUBCUTANEOUS at 06:07

## 2020-06-21 RX ADMIN — NYSTATIN AND TRIAMCINOLONE ACETONIDE SCH: 100000; 1 OINTMENT TOPICAL at 17:32

## 2020-06-21 RX ADMIN — DOXAZOSIN SCH MG: 2 TABLET ORAL at 22:11

## 2020-06-21 RX ADMIN — NYSTATIN AND TRIAMCINOLONE ACETONIDE SCH: 100000; 1 OINTMENT TOPICAL at 15:03

## 2020-06-21 RX ADMIN — DOCUSATE SODIUM SCH: 100 CAPSULE, LIQUID FILLED ORAL at 10:17

## 2020-06-21 RX ADMIN — INSULIN HUMAN SCH: 100 INJECTION, SOLUTION PARENTERAL at 22:11

## 2020-06-21 RX ADMIN — MORPHINE SULFATE PRN MG: 10 INJECTION INTRAMUSCULAR; INTRAVENOUS; SUBCUTANEOUS at 15:27

## 2020-06-21 RX ADMIN — INSULIN HUMAN SCH UNIT: 100 INJECTION, SOLUTION PARENTERAL at 12:08

## 2020-06-21 RX ADMIN — Medication SCH: at 15:03

## 2020-06-21 RX ADMIN — Medication SCH ML: at 06:07

## 2020-06-21 RX ADMIN — INSULIN HUMAN SCH: 100 INJECTION, SOLUTION PARENTERAL at 15:58

## 2020-06-21 RX ADMIN — APIXABAN SCH MG: 5 TABLET, FILM COATED ORAL at 10:18

## 2020-06-21 RX ADMIN — LINEZOLID SCH MG: 600 TABLET, FILM COATED ORAL at 22:10

## 2020-06-21 RX ADMIN — LINEZOLID SCH MG: 600 TABLET, FILM COATED ORAL at 10:14

## 2020-06-21 RX ADMIN — APIXABAN SCH: 5 TABLET, FILM COATED ORAL at 17:32

## 2020-06-21 RX ADMIN — Medication PRN MG: at 22:10

## 2020-06-21 RX ADMIN — INSULIN HUMAN SCH UNIT: 100 INJECTION, SOLUTION PARENTERAL at 10:16

## 2020-06-21 RX ADMIN — Medication SCH ML: at 22:11

## 2020-06-21 NOTE — PDOC CONSULTATION
Consultation


Consult Date: 06/21/20


Attending physician:: MADELINE HARLEY


Provider Consulted: VITOR SEGOVIA


Consult reason:: ABDOMINAL WALL ABSCESS





History of Present Illness


Admission Date/PCP: 


  06/20/20 19:40





  RIKI ALAS DO





History of Present Illness: 


EVERARDO GEORGE is a 55 year old female


EVERARDO GEORGE is a 55 year old female who presented to the emergency room with a

2-week history of "skin sores".  She admits to the gradual development of 3 

areas of "skin sores" over the last 2 weeks.  The first area erupted on her 

right thigh with redness being present constantly and gradually spreading 

becoming more tender and eventually opening to drain pus.  The second area began

on her left abdominal pannus region 1 week ago and has gradually increased in 

redness and tenderness.  The third area is in her gerardo-labial groin and has been

gradually developing over the last 2 days.  She denies associated or 

accompanying signs and symptoms.  She admits prior similar episodes of lesser 

degree.  She has not identified any aggravating or ameliorating factors for her 

"skin sores".  In the emergency room she was found to have a draining abscess of

the right thigh and a new abscess of the left abdominal pannus which was incised

and drained with packing placed.  Her groin was evaluated and no abscess or 

obvious cellulitis was found, however intertrigo was noted.  Patient's white 

blood count was 13,000 and her blood sugar was 449.  She was started on IV 

antibiotic therapy with clindamycin and treated with insulin.  She was 

subsequently admitted observation status for further evaluation and treatmen





Past Medical History


Cardiac Medical History: Reports: Atrial Fibrillation, Congestive Heart Failure,

Hyperlipidema, Hypertension


   Denies: Coronary Artery Disease, DVT, Myocardial Infarction, Pulmonary 

Embolism


Pulmonary Medical History: Reports: Asthma, Bronchitis


   Denies: Chronic Obstructive Pulmonary Disease (COPD), Pneumonia


EENT Medical History: 


   Denies: Cataracts, Ears - Hearing aids


Neurological Medical History: Reports: Migraine


   Denies: Seizures


Endocrine Medical History: Reports: Diabetes Mellitus Type 2 - Poorly compliant 

with medications and diet, Hypothyroidism, Obesity


   Denies: Diabetes Mellitus Type 1, Hyperthyroidism


Renal/ Medical History: Reports: Chronic Kidney Disease


   Denies: Nephrolithiasis


Malignancy Medical History: Reports: None


GI Medical History: Reports: Gastroesophageal Reflux Disease


   Denies: Cirrhosis, Crohn's Disease, Hepatitis, Peptic Ulcer Disease, 

Ulcerative Colitis


Musculoskeltal Medical History: Reports: Arthritis


   Denies: Gout


Skin Medical History: 


   Denies: Eczema, Psoriasis


Psychiatric Medical History: Reports: Depression, Schizoaffective Disorder


   Denies: Alcohol Dependency, Substance Abuse, Tobacco Dependency


Traumatic Medical History: Reports: None


Hematology: 


   Denies: Anemia, Bleeding Tendencies


Infectious Medical History: Reports: None





Past Surgical History


Past Surgical History: Reports: Cholecystectomy, Tubal Ligation


   Denies: Hysterectomy





Social History


Lives with: Spouse/Significant other


Smoking Status: Former Smoker


Electronic Cigarette use?: No


Frequency of Alcohol Use: None


Hx Recreational Drug Use: No


Drugs: None


Hx Prescription Drug Abuse: No





- Advance Directive


Resuscitation Status: Full Code





Family History


Family History: CAD, CVA, DM, Hyperlipidemia, Hypertension, Malignancy, Thyroid 

Disfunction, Other - Asthma


Parental Family History Reviewed: No


Children Family History Reviewed: NA


Sibling(s) Family History Reviewed.: NA





Medication/Allergy


Home Medications: 








Alprazolam [Xanax] 1 mg PO TIDP PRN MDD 4 MG 12/20/17 


Amitriptyline HCl [Elavil 25 mg Tablet] 50 mg PO QHS 12/20/17 


Apixaban [Eliquis 5 mg Tablet] 5 mg PO Q12 12/20/17 


Zolpidem Tartrate [Ambien] 10 mg PO QHS 12/20/17 


Brexpiprazole [Rexulti] 2 mg PO QHS 11/16/18 


Desvenlafaxine [Desvenlafaxine ER] 50 mg PO DAILY 11/16/18 


Solifenacin Succinate [Vesicare] 5 mg PO DAILY 11/16/18 


Fluticasone Propionate [Flovent Hfa] 1 puff IH Q12 03/22/20 


Fluticasone/Salmeterol [Advair 250-50 Diskus 14 Dose/Diskus] 1 inh IH Q12 0

3/22/20 


Glipizide [Glipizide Xl] 10 mg PO [id number] 03/22/20 


Oxycodone HCl/Acetaminophen [Percocet 5-325 mg Tablet] 1 tab PO Q8HP PRN 

03/22/20 


Prazosin HCl [Minipress] 2 mg PO QHS 03/22/20 


Sitagliptin Phosphate [Januvia 50 mg Tablet] 100 mg PO DAILY 03/22/20 


Tizanidine HCl 4 mg PO Q8 03/22/20 


Furosemide [Lasix 40 mg Tablet] 40 mg PO DAILY #30 tablet 03/31/20 


Levothyroxine Sodium [Synthroid 0.15 mg Tablet] 0.15 mg PO DAILY #30 tablet 

03/31/20 


Magnesium Oxide [Mag-Ox 400 mg Tablet] 400 mg PO DAILY 15 Days  tablet 03/31/20 


Metoprolol Succinate [Toprol Xl 25 mg Tab.sr] 50 mg PO Q12 30 Days 03/31/20 


Levofloxacin [Levaquin 750 mg Tablet] 750 mg PO DAILY #4 tablet 05/13/20 


Dexamethasone [Decadron 4 Mg Tablet] 4 mg PO DAILY #3 tablet 06/04/20 


Lidocaine [Lidoderm 5% (700 mg) Transdermal Patch] 1 patch TP DAILY #30 

adh..patch 06/04/20 








Allergies/Adverse Reactions: 


                                        





tramadol Allergy (Intermediate, Verified 12/15/19 17:06)


   AMS


Penicillins Allergy (Mild, Verified 12/15/19 17:06)


   rash


Sulfa (Sulfonamide Antibiotics) Allergy (Mild, Verified 12/15/19 17:06)


   rash


aspirin Allergy (Verified 12/15/19 17:06)


   NO ASA











Review of Systems


Constitutional: PRESENT: as per HPI, fatigue


Eyes: ABSENT: as per HPI, visual disturbances, other


Ears: ABSENT: as per HPI, hearing changes, other


Nose, Mouth, and Throat: ABSENT: as per HPI, headache(s), mouth pain, sore 

throat, vertigo, other


Breasts: ABSENT: as per HPI, other


Cardiovascular: ABSENT: as per HPI, chest pain, dyspnea on exertion, edema, ort

hropnea, palpitations, other


Respiratory: ABSENT: as per HPI, cough, dyspnea, hemoptysis, sputum, other


Gastrointestinal: ABSENT: as per HPI, abdominal pain, bloating, coffee ground 

emesis, constipation, diarrhea, dysphagia, heartburn, hematemesis, hematochezia,

melena, nausea, vomiting, other


Genitourinary: ABSENT: as per HPI, difficulty urinating, dysuria, hematuria, 

nocturia, other


Integumentary: PRESENT: wounds


Neurological: ABSENT: as per HPI, abnormal gait, abnormal movements, abnormal 

speech, confusion, convulsions, dizziness, focal weakness, frequent falls, lack 

of coordination, memory loss, numbness, paresthesias, restless legs, syncope, 

tingling, tremor(s), vertigo, weakness, other


Psychiatric: ABSENT: as per HPI, anxiety, depression, hallucinations, homidical 

ideation, suicidal ideation, other


Endocrine: ABSENT: as per HPI, cold intolerance, flushing, heat intolerance, 

menstrual abnormalities, polydipsia, polyphagia, polyuria, other


Hematologic/Lymphatic: ABSENT: as per HPI, easy bleeding, easy bruising, 

lymphadenopathy, other


Allergic/Immunologic: ABSENT: as per HPI, seasonal rhinorrhea, other





Physical Exam


Vital Signs: 


                                        











Temp Pulse Resp BP Pulse Ox


 


 98.4 F   60   17   82/51 L  91 L


 


 06/21/20 08:00  06/21/20 08:00  06/21/20 08:00  06/21/20 08:00  06/21/20 08:00








                                 Intake & Output











 06/20/20 06/21/20 06/22/20





 06:59 06:59 06:59


 


Intake Total  1120 


 


Balance  1120 


 


Weight  112.1 kg 











General appearance: PRESENT: disheveled


Head exam: PRESENT: normocephalic


Eye exam: PRESENT: EOMI


Mouth exam: PRESENT: moist


Teeth exam: PRESENT: poor dentation


Neck exam: PRESENT: carotid bruit


Respiratory exam: PRESENT: clear to auscultation pallavi


Cardiovascular exam: PRESENT: RRR


Breast: PRESENT: Normal


GI/Abdominal exam: PRESENT: soft, other - MULTIPLE SKIN ABSCESS AND ABD PANNUS 

AND THIGH.


Rectal exam: PRESENT: deferred


Musculoskeletal exam: PRESENT: full ROM


Neurological exam: PRESENT: alert, awake


Skin exam: PRESENT: dry





Results


Laboratory Results: 


                                        





                                 06/21/20 06:27 





                                 06/21/20 06:27 





                                        











  06/20/20 06/20/20 06/20/20





  15:35 15:35 16:29


 


WBC  13.3 H  


 


RBC  4.33  


 


Hgb  13.4  


 


Hct  41.9  


 


MCV  97  


 


MCH  31.0  


 


MCHC  32.0  


 


RDW  14.4 H  


 


Plt Count  352  


 


Seg Neutrophils %  78.7 H  


 


Sodium   130.0 L 


 


Potassium   4.2 


 


Chloride   95 L 


 


Carbon Dioxide   24 


 


Anion Gap   11 


 


BUN   30 H 


 


Creatinine   1.64 H 


 


Est GFR ( Amer)   40 L 


 


Glucose   449 H* 


 


Calcium   9.2 


 


Magnesium   


 


Total Bilirubin   1.1 


 


AST   23 


 


Alkaline Phosphatase   167 H 


 


Total Protein   6.3 


 


Albumin   3.6 


 


Triglycerides   


 


Cholesterol   


 


LDL Cholesterol Direct   


 


VLDL Cholesterol   


 


HDL Cholesterol   


 


TSH   


 


Urine Color    YELLOW


 


Urine Appearance    SLIGHTLY-CLOUDY


 


Urine pH    5.0


 


Ur Specific Gravity    1.014


 


Urine Protein    NEGATIVE


 


Urine Glucose (UA)    >=500 H


 


Urine Ketones    NEGATIVE


 


Urine Blood    LARGE H


 


Urine Nitrite    NEGATIVE


 


Ur Leukocyte Esterase    MODERATE H


 


Urine WBC (Auto)    3


 


Urine RBC (Auto)    10














  06/21/20 06/21/20 06/21/20





  06:27 06:27 06:27


 


WBC  12.0 H  


 


RBC  3.92  


 


Hgb  12.2  


 


Hct  37.1  


 


MCV  95  


 


MCH  31.0  


 


MCHC  32.8  


 


RDW  14.2 H  


 


Plt Count  277  


 


Seg Neutrophils %   


 


Sodium   132.9 L 


 


Potassium   4.4 


 


Chloride   103 


 


Carbon Dioxide   23 


 


Anion Gap   7 


 


BUN   22 H 


 


Creatinine   1.18 


 


Est GFR ( Amer)   58 L 


 


Glucose   186 H 


 


Calcium   8.9 


 


Magnesium   1.7 


 


Total Bilirubin   


 


AST   


 


Alkaline Phosphatase   


 


Total Protein   


 


Albumin   


 


Triglycerides   175 H 


 


Cholesterol   183.45 


 


LDL Cholesterol Direct   104 H 


 


VLDL Cholesterol   35.0 H 


 


HDL Cholesterol   41 


 


TSH    4.63


 


Urine Color   


 


Urine Appearance   


 


Urine pH   


 


Ur Specific Gravity   


 


Urine Protein   


 


Urine Glucose (UA)   


 


Urine Ketones   


 


Urine Blood   


 


Urine Nitrite   


 


Ur Leukocyte Esterase   


 


Urine WBC (Auto)   


 


Urine RBC (Auto)   








                                        











  06/20/20





  15:35


 


Troponin I  < 0.012











Impressions: 


                                        





Abdomen/Pelvis CT  06/20/20 18:43


IMPRESSION:  Left pannus soft tissue injury with nonspecific radiodense material

seen at the defect site.  Surrounding inflammatory changes without abscess.  

Chronic and incidental intra-abdominal findings as above.


 














Assessment & Plan





- Plan Summary


Plan Summary: 





IMPRESSION, 


MULTIPLE SKIN ABSCESSES


ABDOMINAL PANNUS, AND RIGHT THIGH


PLAN ON DEBRIDEMENT IN OR

## 2020-06-21 NOTE — OPERATIVE REPORT
Nonrecallable Operative Report


DATE OF SURGERY: 06/21/20


PREOPERATIVE DIAGNOSIS: Left abdominal wall abscess and right thigh abscess


POSTOPERATIVE DIAGNOSIS: Left abdominal wall abscess and right thigh abscess


OPERATION: Excision of left abdominal wall abscess and right thigh abscess


SURGEON: VITOR SEGOVIA


ANESTHESIA: LMAC


TISSUE REMOVED OR ALTERED: Skin left abdominal wall right thigh


COMPLICATIONS: 





None


INTRAOPERATIVE FINDINGS: 25 cc


PROCEDURE: 





Patient was brought to the operating room awake alert stable condition placed on

the operative table supine position given IV sedation and local MAC anesthesia.





After adequate prep and drape of the right thigh and the left abdominal wall 

site verification the procedure commenced.





Elliptical incision was made around a 4 cm abscess on the left abdominal wall 

pannus dissection was carried down through subcutaneous tissue with Bovie 

cautery to we reached the deep subcutaneous fat and excised the abscess cavity 

in an elliptical fashion.  The wound bed was clean and there was no spillage of 

pus however because of the surrounding edema I elected to leave the wound open I

irrigated the depths of the wound down to the abdominal wall muscles with normal

saline.  And obtained good hemostasis.  Attention was then turned to the right 

anterior medial thigh over the femoral triangle elliptical incision was made 

around the 2 cm abscess dissection was carried down through subtenons tissue 

with Bovie cautery and we excise the abscess.  Again the wound edges were clean 

without evidence of drainage of pus and hemostasis was obtained with Bovie 

cautery both wounds were irrigated with normal saline suctioned dry they were 

packed with a Betadine soaked sponge.  Sterile dressing was applied which 

completed the procedure estimated blood loss for the procedure was 25 cc sponge 

needle counts correct x2 the patient was then transferred back to recovery in 

stable condition no complications

## 2020-06-21 NOTE — PDOC PROGRESS REPORT
Subjective


Progress Note for:: 06/20/20


Subjective:: 





55 year old female who presented to the emergency room with a 2-week history of 

"skin sores".  She admits to the gradual development of 3 areas of "skin sores" 

over the last 2 weeks.  The first area erupted on her right thigh with redness 

being present constantly and gradually spreading becoming more tender and 

eventually opening to drain pus.  The second area began on her left abdominal 

pannus region 1 week ago and has gradually increased in redness and tenderness. 

The third area is in her gerardo-labial groin and has been gradually developing 

over the last 2 days.  She denies associated or accompanying signs and symptoms.

 She admits prior similar episodes of lesser degree.  She has not identified any

aggravating or ameliorating factors for her "skin sores".  In the emergency room

she was found to have a draining abscess of the right thigh and a new abscess of

the left abdominal pannus which was incised and drained with packing placed.  

Her groin was evaluated and no abscess or obvious cellulitis was found, however 

intertrigo was noted.  Patient's white blood count was 13,000 and her blood 

sugar was 449.  She was started on IV antibiotic therapy with clindamycin and 

treated with insulin.  She was subsequently admitted observation status for 

further evaluation and treatment.





6/21/20-no acute events the last 24 hours.  WBC count improved to 12,000.  On 

examination for me looks like patient has abdominal wall abscess.  Dr. Roman 

is going to see the patient today for further recommendations.  Plan is to 

continue the IV antibiotic therapy and waiting for the blood cultures and wound 

cultures at this time.


Reason For Visit: 


ACUTE KIDNEY INJURY, CELLULITIS WITH ABSCESS








Physical Exam


Vital Signs: 


                                        











Temp Pulse Resp BP Pulse Ox


 


 98.4 F   60   17   82/51 L  91 L


 


 06/21/20 08:00  06/21/20 08:00  06/21/20 08:00  06/21/20 08:00  06/21/20 08:00








                                 Intake & Output











 06/20/20 06/21/20 06/22/20





 06:59 06:59 06:59


 


Intake Total  1120 


 


Balance  1120 


 


Weight  112.1 kg 











General appearance: PRESENT: no acute distress, morbidly obese


Head exam: PRESENT: atraumatic


Eye exam: PRESENT: PERRLA


Mouth exam: PRESENT: dry mucosa


Teeth exam: PRESENT: poor dentation


Neck exam: ABSENT: carotid bruit, JVD, lymphadenopathy, thyromegaly


Respiratory exam: PRESENT: clear to auscultation pallavi.  ABSENT: rales, rhonchi, 

wheezes


Cardiovascular exam: PRESENT: RRR.  ABSENT: diastolic murmur, rubs, systolic 

murmur


Pulses: PRESENT: normal dorsalis pedis pul


GI/Abdominal exam: PRESENT: mass, normal bowel sounds, soft, other - On 

examination of the left lower abdominal wall erythema present associated with 

significant tissue hard in consistency on palpation.  She may have an abscess.. 

ABSENT: distended, guarding, organolmegaly, rebound, tenderness


Rectal exam: PRESENT: deferred


Extremities exam: PRESENT: full ROM.  ABSENT: calf tenderness, clubbing, pedal 

edema


Neurological exam: PRESENT: alert, awake, oriented to person, oriented to place,

oriented to time, oriented to situation, CN II-XII grossly intact.  ABSENT: 

motor sensory deficit


Psychiatric exam: PRESENT: appropriate affect, normal mood.  ABSENT: homicidal 

ideation, suicidal ideation


Skin exam: PRESENT: dry, intact, warm.  ABSENT: cyanosis, rash





Results


Laboratory Results: 


                                        





                                 06/21/20 06:27 





                                 06/21/20 06:27 





                                        











  06/20/20 06/20/20 06/20/20





  15:35 15:35 16:29


 


WBC  13.3 H  


 


RBC  4.33  


 


Hgb  13.4  


 


Hct  41.9  


 


MCV  97  


 


MCH  31.0  


 


MCHC  32.0  


 


RDW  14.4 H  


 


Plt Count  352  


 


Seg Neutrophils %  78.7 H  


 


Sodium   130.0 L 


 


Potassium   4.2 


 


Chloride   95 L 


 


Carbon Dioxide   24 


 


Anion Gap   11 


 


BUN   30 H 


 


Creatinine   1.64 H 


 


Est GFR ( Amer)   40 L 


 


Glucose   449 H* 


 


Calcium   9.2 


 


Magnesium   


 


Total Bilirubin   1.1 


 


AST   23 


 


Alkaline Phosphatase   167 H 


 


Total Protein   6.3 


 


Albumin   3.6 


 


Triglycerides   


 


Cholesterol   


 


LDL Cholesterol Direct   


 


VLDL Cholesterol   


 


HDL Cholesterol   


 


TSH   


 


Urine Color    YELLOW


 


Urine Appearance    SLIGHTLY-CLOUDY


 


Urine pH    5.0


 


Ur Specific Gravity    1.014


 


Urine Protein    NEGATIVE


 


Urine Glucose (UA)    >=500 H


 


Urine Ketones    NEGATIVE


 


Urine Blood    LARGE H


 


Urine Nitrite    NEGATIVE


 


Ur Leukocyte Esterase    MODERATE H


 


Urine WBC (Auto)    3


 


Urine RBC (Auto)    10














  06/21/20 06/21/20 06/21/20





  06:27 06:27 06:27


 


WBC  12.0 H  


 


RBC  3.92  


 


Hgb  12.2  


 


Hct  37.1  


 


MCV  95  


 


MCH  31.0  


 


MCHC  32.8  


 


RDW  14.2 H  


 


Plt Count  277  


 


Seg Neutrophils %   


 


Sodium   132.9 L 


 


Potassium   4.4 


 


Chloride   103 


 


Carbon Dioxide   23 


 


Anion Gap   7 


 


BUN   22 H 


 


Creatinine   1.18 


 


Est GFR ( Amer)   58 L 


 


Glucose   186 H 


 


Calcium   8.9 


 


Magnesium   1.7 


 


Total Bilirubin   


 


AST   


 


Alkaline Phosphatase   


 


Total Protein   


 


Albumin   


 


Triglycerides   175 H 


 


Cholesterol   183.45 


 


LDL Cholesterol Direct   104 H 


 


VLDL Cholesterol   35.0 H 


 


HDL Cholesterol   41 


 


TSH    4.63


 


Urine Color   


 


Urine Appearance   


 


Urine pH   


 


Ur Specific Gravity   


 


Urine Protein   


 


Urine Glucose (UA)   


 


Urine Ketones   


 


Urine Blood   


 


Urine Nitrite   


 


Ur Leukocyte Esterase   


 


Urine WBC (Auto)   


 


Urine RBC (Auto)   








                                        











  06/20/20





  15:35


 


Troponin I  < 0.012











Impressions: 


                                        





Abdomen/Pelvis CT  06/20/20 18:43


IMPRESSION:  Left pannus soft tissue injury with nonspecific radiodense material

seen at the defect site.  Surrounding inflammatory changes without abscess.  

Chronic and incidental intra-abdominal findings as above.


 














Assessment and Plan





- Diagnosis


(1) Cellulitis of left abdominal wall


Is this a current diagnosis for this admission?: Yes   


Plan: 


6/21/2020-and is receiving levofloxacin and linezolid.  Consultation with Dr. Roman was requested.  Wound cultures blood cultures are pending.  CT 

abdominal pelvis did not indicate of any abscess.








(2) Obesity


Qualifiers: 


   Obesity type: unspecified obesity type   Obesity classification: adult class 

3 (BMI >= 40)   Serious obesity comorbidity presence: with serious comorbidity  

Body mass index: BMI 40.0-44.9   Qualified Code(s): E66.01 - Morbid (severe) 

obesity due to excess calories; Z68.41 - Body mass index (BMI) 40.0-44.9, adult 

 


Is this a current diagnosis for this admission?: No   


Plan: 


6/21/2020-BMI is more than 43 diet exercise weight loss lifestyle modifications 

discussed with the patient.








(3) Poorly controlled diabetes mellitus


Is this a current diagnosis for this admission?: Yes   


Plan: 


6/21/2020-hemoglobin A1c is 11.8 latest blood sugar is 186.  Diet exercise 

weight loss lifestyle modifications discussed with the patient and a dietary 

consult will be requested.








(4) Acute kidney injury (nontraumatic)


Is this a current diagnosis for this admission?: Yes   


Plan: 


6/21/2020-came in with a serum creatinine 1.64 and it is improved to 1.18 with 

IV fluids.  Plan is to continue the IV fluids at this time.








(5) Hypertension


Qualifiers: 


   Hypertension type: essential hypertension   Qualified Code(s): I10 - 

Essential (primary) hypertension   


Is this a current diagnosis for this admission?: No   


Plan: 


6/21/2020-patient has a history of chronic essential hypertension blood pressure

today is 108/70 low normal.  Plan is to continue IV fluids at this time.








(6) Hyponatremia


Is this a current diagnosis for this admission?: Yes   


Plan: 


6/21/2020-admission serum sodium is 130 improved to 132.9 today with IV fluids. 

Blood sugar is 186.  Hyponatremia most likely secondary to uncontrolled diabetes

mellitus.








- Plan Summary


Summary: 


Patient is admitted to observation status on the medical floor where she will 

receive routine supportive and symptomatic cares.  She will be treated with oral

Levaquin and Zyvox.  An initial dose of intravenous insulin will be given to 

normalize her blood sugar.  She will receive IV fluids using lactated Ringer's 

at 250 mL/h x 12 hours.  Before meals and at bedtime Accu-Cheks will be 

performed and sliding scale insulin will be used for hyperglycemia with a 

hypoglycemic protocol in place.  She will use morphine sulfate 2 to 4 mg IV 

every 2 hours as needed for pain.  She will use Ativan 1 mg IV every 4 hours as 

needed for anxiety or restlessness.  She will be placed on a cardiac and 

diabetic restricted diet.  A CBC, metabolic profile, magnesium level, hemoglobin

A1c, thyroid profile, BNP and lipid profile will be obtained in the morning.  

The patient's home medications will be resumed, as appropriate, once her 

medication list has been verified and reconciled.

## 2020-06-22 LAB
ADD MANUAL DIFF: NO
ALBUMIN SERPL-MCNC: 2.9 G/DL (ref 3.5–5)
ALBUMIN SERPL-MCNC: 2.9 G/DL (ref 3.5–5)
ALP SERPL-CCNC: 118 U/L (ref 38–126)
ALP SERPL-CCNC: 125 U/L (ref 38–126)
ANION GAP SERPL CALC-SCNC: 10 MMOL/L (ref 5–19)
ANION GAP SERPL CALC-SCNC: 8 MMOL/L (ref 5–19)
AST SERPL-CCNC: 20 U/L (ref 14–36)
AST SERPL-CCNC: 23 U/L (ref 14–36)
BASOPHILS # BLD AUTO: 0.1 10^3/UL (ref 0–0.2)
BASOPHILS NFR BLD AUTO: 0.7 % (ref 0–2)
BILIRUB DIRECT SERPL-MCNC: 0.1 MG/DL (ref 0–0.4)
BILIRUB DIRECT SERPL-MCNC: 0.2 MG/DL (ref 0–0.4)
BILIRUB SERPL-MCNC: 0.8 MG/DL (ref 0.2–1.3)
BILIRUB SERPL-MCNC: 0.9 MG/DL (ref 0.2–1.3)
BUN SERPL-MCNC: 13 MG/DL (ref 7–20)
BUN SERPL-MCNC: 13 MG/DL (ref 7–20)
CALCIUM: 9.1 MG/DL (ref 8.4–10.2)
CALCIUM: 9.2 MG/DL (ref 8.4–10.2)
CHLORIDE SERPL-SCNC: 102 MMOL/L (ref 98–107)
CHLORIDE SERPL-SCNC: 103 MMOL/L (ref 98–107)
CO2 SERPL-SCNC: 21 MMOL/L (ref 22–30)
CO2 SERPL-SCNC: 24 MMOL/L (ref 22–30)
EOSINOPHIL # BLD AUTO: 0.1 10^3/UL (ref 0–0.6)
EOSINOPHIL NFR BLD AUTO: 1.4 % (ref 0–6)
ERYTHROCYTE [DISTWIDTH] IN BLOOD BY AUTOMATED COUNT: 14.1 % (ref 11.5–14)
GLUCOSE SERPL-MCNC: 123 MG/DL (ref 75–110)
GLUCOSE SERPL-MCNC: 147 MG/DL (ref 75–110)
HCT VFR BLD CALC: 36.3 % (ref 36–47)
HGB BLD-MCNC: 11.7 G/DL (ref 12–15.5)
LYMPHOCYTES # BLD AUTO: 2.1 10^3/UL (ref 0.5–4.7)
LYMPHOCYTES NFR BLD AUTO: 19.7 % (ref 13–45)
MCH RBC QN AUTO: 31 PG (ref 27–33.4)
MCHC RBC AUTO-ENTMCNC: 32.3 G/DL (ref 32–36)
MCV RBC AUTO: 96 FL (ref 80–97)
MONOCYTES # BLD AUTO: 0.5 10^3/UL (ref 0.1–1.4)
MONOCYTES NFR BLD AUTO: 5.1 % (ref 3–13)
NEUTROPHILS # BLD AUTO: 7.7 10^3/UL (ref 1.7–8.2)
NEUTS SEG NFR BLD AUTO: 73.1 % (ref 42–78)
PLATELET # BLD: 276 10^3/UL (ref 150–450)
POTASSIUM SERPL-SCNC: 4.6 MMOL/L (ref 3.6–5)
POTASSIUM SERPL-SCNC: 4.8 MMOL/L (ref 3.6–5)
PROT SERPL-MCNC: 5.4 G/DL (ref 6.3–8.2)
PROT SERPL-MCNC: 5.7 G/DL (ref 6.3–8.2)
RBC # BLD AUTO: 3.78 10^6/UL (ref 3.72–5.28)
TOTAL CELLS COUNTED % (AUTO): 100 %
WBC # BLD AUTO: 10.6 10^3/UL (ref 4–10.5)

## 2020-06-22 RX ADMIN — Medication SCH ML: at 13:36

## 2020-06-22 RX ADMIN — LINEZOLID SCH MG: 600 TABLET, FILM COATED ORAL at 09:58

## 2020-06-22 RX ADMIN — METOPROLOL SUCCINATE SCH: 50 TABLET, EXTENDED RELEASE ORAL at 21:50

## 2020-06-22 RX ADMIN — DOXAZOSIN SCH: 2 TABLET ORAL at 21:44

## 2020-06-22 RX ADMIN — PREGABALIN SCH MG: 75 CAPSULE ORAL at 21:42

## 2020-06-22 RX ADMIN — TOLTERODINE TARTRATE SCH MG: 1 TABLET, FILM COATED ORAL at 21:45

## 2020-06-22 RX ADMIN — TOLTERODINE TARTRATE SCH MG: 1 TABLET, FILM COATED ORAL at 10:02

## 2020-06-22 RX ADMIN — DOCUSATE SODIUM SCH MG: 100 CAPSULE, LIQUID FILLED ORAL at 17:02

## 2020-06-22 RX ADMIN — VENLAFAXINE HYDROCHLORIDE SCH MG: 75 CAPSULE, EXTENDED RELEASE ORAL at 09:56

## 2020-06-22 RX ADMIN — Medication SCH: at 21:50

## 2020-06-22 RX ADMIN — OXYCODONE AND ACETAMINOPHEN PRN TAB: 5; 325 TABLET ORAL at 08:55

## 2020-06-22 RX ADMIN — INSULIN HUMAN SCH UNIT: 100 INJECTION, SOLUTION PARENTERAL at 11:51

## 2020-06-22 RX ADMIN — ZOLPIDEM TARTRATE SCH MG: 5 TABLET ORAL at 21:43

## 2020-06-22 RX ADMIN — NYSTATIN AND TRIAMCINOLONE ACETONIDE SCH APPLIC: 100000; 1 OINTMENT TOPICAL at 10:02

## 2020-06-22 RX ADMIN — DIPHENHYDRAMINE HYDROCHLORIDE SCH MG: 25 CAPSULE ORAL at 21:43

## 2020-06-22 RX ADMIN — TIZANIDINE SCH MG: 4 TABLET ORAL at 21:49

## 2020-06-22 RX ADMIN — METOPROLOL SUCCINATE SCH: 50 TABLET, EXTENDED RELEASE ORAL at 09:55

## 2020-06-22 RX ADMIN — SODIUM CHLORIDE PRN MLS/HR: 9 INJECTION, SOLUTION INTRAVENOUS at 20:46

## 2020-06-22 RX ADMIN — TIZANIDINE SCH MG: 4 TABLET ORAL at 13:32

## 2020-06-22 RX ADMIN — LINEZOLID SCH MG: 600 TABLET, FILM COATED ORAL at 21:45

## 2020-06-22 RX ADMIN — FUROSEMIDE SCH MG: 40 TABLET ORAL at 09:56

## 2020-06-22 RX ADMIN — INSULIN HUMAN SCH UNIT: 100 INJECTION, SOLUTION PARENTERAL at 17:02

## 2020-06-22 RX ADMIN — PANTOPRAZOLE SODIUM SCH MG: 40 TABLET, DELAYED RELEASE ORAL at 05:52

## 2020-06-22 RX ADMIN — Medication SCH ML: at 05:52

## 2020-06-22 RX ADMIN — DOCUSATE SODIUM SCH MG: 100 CAPSULE, LIQUID FILLED ORAL at 09:57

## 2020-06-22 RX ADMIN — NYSTATIN AND TRIAMCINOLONE ACETONIDE SCH APPLIC: 100000; 1 OINTMENT TOPICAL at 17:04

## 2020-06-22 RX ADMIN — LEVOFLOXACIN SCH MG: 750 TABLET, FILM COATED ORAL at 21:49

## 2020-06-22 RX ADMIN — INSULIN HUMAN SCH: 100 INJECTION, SOLUTION PARENTERAL at 21:51

## 2020-06-22 RX ADMIN — INSULIN HUMAN SCH: 100 INJECTION, SOLUTION PARENTERAL at 07:34

## 2020-06-22 RX ADMIN — APIXABAN SCH MG: 5 TABLET, FILM COATED ORAL at 17:02

## 2020-06-22 RX ADMIN — AMITRIPTYLINE HYDROCHLORIDE SCH MG: 25 TABLET, FILM COATED ORAL at 21:42

## 2020-06-22 RX ADMIN — PREGABALIN SCH MG: 75 CAPSULE ORAL at 09:57

## 2020-06-22 RX ADMIN — NYSTATIN AND TRIAMCINOLONE ACETONIDE SCH APPLIC: 100000; 1 OINTMENT TOPICAL at 13:33

## 2020-06-22 RX ADMIN — APIXABAN SCH MG: 5 TABLET, FILM COATED ORAL at 09:57

## 2020-06-22 NOTE — PDOC PROGRESS REPORT
Subjective


Progress Note for:: 06/22/20


Reason For Visit: 


ACUTE KIDNEY INJURY, CELLULITIS WITH ABSCESS


Patient essentially noncommunicative.





Physical Exam


Vital Signs: 


                                        











Temp Pulse Resp BP Pulse Ox


 


 98.2 F   103 H  16   96/44 L  92 


 


 06/22/20 12:00  06/22/20 12:00  06/22/20 12:00  06/22/20 12:00  06/22/20 12:00








                                 Intake & Output











 06/21/20 06/22/20 06/23/20





 06:59 06:59 06:59


 


Intake Total 2120 3600 1000


 


Output Total  20 


 


Balance 2120 3580 1000


 


Weight 112.1 kg 112.1 kg 











General appearance: PRESENT: no acute distress


GI/Abdominal exam: PRESENT: other - Abdominal wall packing in right inner thigh 

packing removed.  No foul smell, active drainage.  Wounds are clean.  Wounds 

repacked very easily and well tolerated at bedside.


Musculoskeletal exam: PRESENT: other - Left axilla with acute abscess 

spontaneously draining





Results


Laboratory Results: 


                                        





                                 06/22/20 05:00 





                                 06/22/20 07:43 





                                        











  06/22/20 06/22/20 06/22/20





  05:00 05:00 07:43


 


WBC  10.6 H  


 


RBC  3.78  


 


Hgb  11.7 L  


 


Hct  36.3  


 


MCV  96  


 


MCH  31.0  


 


MCHC  32.3  


 


RDW  14.1 H  


 


Plt Count  276  


 


Seg Neutrophils %  73.1  


 


Sodium   134.4 L  133.8 L


 


Potassium   4.6  4.8


 


Chloride   103  102


 


Carbon Dioxide   21 L  24


 


Anion Gap   10  8


 


BUN   13  13


 


Creatinine   0.99  1.03


 


Est GFR ( Amer)   > 60  > 60


 


Glucose   123 H  147 H


 


Calcium   9.1  9.2


 


Magnesium   1.7  1.7


 


Total Bilirubin   0.9  0.8


 


AST   20  23


 


Alkaline Phosphatase   118  125


 


Total Protein   5.4 L  5.7 L


 


Albumin   2.9 L  2.9 L








                                        





06/20/20 17:20   Blood   Blood Culture (PCR) - Final





                                        











  06/20/20





  15:35


 


Troponin I  < 0.012











Impressions: 


                                        





Abdomen/Pelvis CT  06/20/20 18:43


IMPRESSION:  Left pannus soft tissue injury with nonspecific radiodense material

seen at the defect site.  Surrounding inflammatory changes without abscess.  

Chronic and incidental intra-abdominal findings as above.


 














Assessment & Plan





- Diagnosis


(1) Cutaneous abscess of abdominal wall


Is this a current diagnosis for this admission?: Yes   


Plan: 


Impression: Patient is 1 day status post tissue debridement of anterior 

abdominal wall and right inner thigh soft tissue infections, looking good, no 

foul smell, or drainage; septic source contained; growing gram-positive cocci on

Zosyn and Levaquin; new left axillary abscess requiring I&D





Recommendations:





1.  We will perform I&D at bedside of left axillary abscess





2.  Will initiate local wound care with nursing staff





3.  We will follow-up on antimicrobial sensitivities.








- Time


Time Spent: 30 to 50 Minutes

## 2020-06-22 NOTE — PDOC PROGRESS REPORT
Subjective


Progress Note for:: 06/22/20


Subjective:: 





55 year old female who presented to the emergency room with a 2-week history of 

"skin sores".  She admits to the gradual development of 3 areas of "skin sores" 

over the last 2 weeks.  The first area erupted on her right thigh with redness 

being present constantly and gradually spreading becoming more tender and 

eventually opening to drain pus.  The second area began on her left abdominal 

pannus region 1 week ago and has gradually increased in redness and tenderness. 

The third area is in her gerardo-labial groin and has been gradually developing 

over the last 2 days.  She denies associated or accompanying signs and symptoms.

 She admits prior similar episodes of lesser degree.  She has not identified any

aggravating or ameliorating factors for her "skin sores".  In the emergency room

she was found to have a draining abscess of the right thigh and a new abscess of

the left abdominal pannus which was incised and drained with packing placed.  

Her groin was evaluated and no abscess or obvious cellulitis was found, however 

intertrigo was noted.  Patient's white blood count was 13,000 and her blood 

sugar was 449.  She was started on IV antibiotic therapy with clindamycin and 

treated with insulin.  She was subsequently admitted observation status for 

further evaluation and treatment.





6/21/20-no acute events the last 24 hours.  WBC count improved to 12,000.  On 

examination for me looks like patient has abdominal wall abscess.  Dr. Roman 

is going to see the patient today for further recommendations.  Plan is to 

continue the IV antibiotic therapy and waiting for the blood cultures and wound 

cultures at this time.





6/22/20-no acute events in the last 24 hours.  Patient went for incision and 

drainage of the abdominal wall abscesses and thigh abscess.  Wound cultures came

back positive for gram-positive cocci in clusters presently on levofloxacin and 

linezolid.


Reason For Visit: 


ACUTE KIDNEY INJURY, CELLULITIS WITH ABSCESS








Physical Exam


Vital Signs: 


                                        











Temp Pulse Resp BP Pulse Ox


 


 97.9 F   56 L  16   100/51 L  93 


 


 06/22/20 08:00  06/22/20 08:00  06/22/20 08:00  06/22/20 08:00  06/22/20 08:00








                                 Intake & Output











 06/21/20 06/22/20 06/23/20





 06:59 06:59 06:59


 


Intake Total 2120 3600 1000


 


Output Total  20 


 


Balance 2120 3580 1000


 


Weight 112.1 kg 112.1 kg 











General appearance: PRESENT: no acute distress, morbidly obese


Head exam: PRESENT: atraumatic


Eye exam: PRESENT: PERRLA


Mouth exam: PRESENT: moist, tongue midline


Teeth exam: PRESENT: poor dentation


Neck exam: PRESENT: lymphadenopathy


Respiratory exam: PRESENT: clear to auscultation pallavi.  ABSENT: rales, rhonchi, 

wheezes


Pulses: PRESENT: normal dorsalis pedis pul


GI/Abdominal exam: PRESENT: normal bowel sounds, soft, tenderness, other - 

Dressing present over the left lower abdomen covering the incision site.  

ABSENT: distended, guarding, mass, organolmegaly, rebound


Rectal exam: PRESENT: deferred


Extremities exam: PRESENT: full ROM.  ABSENT: calf tenderness, clubbing, pedal 

edema


Neurological exam: PRESENT: alert, awake, oriented to person, oriented to place,

oriented to time, oriented to situation, CN II-XII grossly intact.  ABSENT: 

motor sensory deficit


Psychiatric exam: PRESENT: appropriate affect, normal mood.  ABSENT: homicidal 

ideation, suicidal ideation





Results


Laboratory Results: 


                                        





                                 06/22/20 05:00 





                                 06/22/20 07:43 





                                        











  06/22/20 06/22/20 06/22/20





  05:00 05:00 07:43


 


WBC  10.6 H  


 


RBC  3.78  


 


Hgb  11.7 L  


 


Hct  36.3  


 


MCV  96  


 


MCH  31.0  


 


MCHC  32.3  


 


RDW  14.1 H  


 


Plt Count  276  


 


Seg Neutrophils %  73.1  


 


Sodium   134.4 L  133.8 L


 


Potassium   4.6  4.8


 


Chloride   103  102


 


Carbon Dioxide   21 L  24


 


Anion Gap   10  8


 


BUN   13  13


 


Creatinine   0.99  1.03


 


Est GFR ( Amer)   > 60  > 60


 


Glucose   123 H  147 H


 


Calcium   9.1  9.2


 


Magnesium   1.7  1.7


 


Total Bilirubin   0.9  0.8


 


AST   20  23


 


Alkaline Phosphatase   118  125


 


Total Protein   5.4 L  5.7 L


 


Albumin   2.9 L  2.9 L








                                        





06/20/20 17:20   Blood   Blood Culture (PCR) - Final





                                        











  06/20/20





  15:35


 


Troponin I  < 0.012











Impressions: 


                                        





Abdomen/Pelvis CT  06/20/20 18:43


IMPRESSION:  Left pannus soft tissue injury with nonspecific radiodense material

seen at the defect site.  Surrounding inflammatory changes without abscess.  

Chronic and incidental intra-abdominal findings as above.


 














Assessment and Plan





- Diagnosis


(1) Cellulitis of left abdominal wall


Is this a current diagnosis for this admission?: Yes   


Plan: 


6/21/2020-and is receiving levofloxacin and linezolid.  Consultation with Dr. Roman was requested.  Wound cultures blood cultures are pending.  CT 

abdominal pelvis did not indicate of any abscess.





6/22/2020-patient has abdominal wall abscess incision and drainage was done by 

Dr. Roman yesterday.  Wound cultures came back positive for gram-positive 

cocci in clusters.








(2) Obesity


Qualifiers: 


   Obesity type: unspecified obesity type   Obesity classification: adult class 

3 (BMI >= 40)   Serious obesity comorbidity presence: with serious comorbidity  

Body mass index: BMI 40.0-44.9   Qualified Code(s): E66.01 - Morbid (severe) 

obesity due to excess calories; Z68.41 - Body mass index (BMI) 40.0-44.9, adult 

 


Is this a current diagnosis for this admission?: No   


Plan: 


6/21/2020-BMI is more than 43 diet exercise weight loss lifestyle modifications 

discussed with the patient.








(3) Poorly controlled diabetes mellitus


Is this a current diagnosis for this admission?: Yes   


Plan: 


6/21/2020-hemoglobin A1c is 11.8 latest blood sugar is 186.  Diet exercise 

weight loss lifestyle modifications discussed with the patient and a dietary 

consult will be requested.





6/22/2020-latest blood sugar status around 150.  Plan is to continue insulin 

sliding scale before meals and at bedtime.








(4) Acute kidney injury (nontraumatic)


Is this a current diagnosis for this admission?: Yes   


Plan: 


6/21/2020-came in with a serum creatinine 1.64 and it is improved to 1.18 with 

IV fluids.  Plan is to continue the IV fluids at this time.





6/22/20-patient came in with a serum creatinine of 1.64 improved to 0.99 today. 

He acute kidney injury is resolved.








(5) Hypertension


Qualifiers: 


   Hypertension type: essential hypertension   Qualified Code(s): I10 - 

Essential (primary) hypertension   


Is this a current diagnosis for this admission?: No   


Plan: 


6/21/2020-patient has a history of chronic essential hypertension blood pressure

today is 108/70 low normal.  Plan is to continue IV fluids at this time.








(6) Hyponatremia


Is this a current diagnosis for this admission?: Yes   


Plan: 


6/21/2020-admission serum sodium is 130 improved to 132.9 today with IV fluids. 

Blood sugar is 186.  Hyponatremia most likely secondary to uncontrolled diabetes

mellitus.





6/22/2020-serum sodium is 134 today is improving and hyponatremia is resolving.








- Plan Summary


Summary: 


Patient is admitted to observation status on the medical floor where she will 

receive routine supportive and symptomatic cares.  She will be treated with oral

Levaquin and Zyvox.  An initial dose of intravenous insulin will be given to 

normalize her blood sugar.  She will receive IV fluids using lactated Ringer's 

at 250 mL/h x 12 hours.  Before meals and at bedtime Accu-Cheks will be per

formed and sliding scale insulin will be used for hyperglycemia with a 

hypoglycemic protocol in place.  She will use morphine sulfate 2 to 4 mg IV 

every 2 hours as needed for pain.  She will use Ativan 1 mg IV every 4 hours as 

needed for anxiety or restlessness.  She will be placed on a cardiac and 

diabetic restricted diet.  A CBC, metabolic profile, magnesium level, hemoglobin

A1c, thyroid profile, BNP and lipid profile will be obtained in the morning.  

The patient's home medications will be resumed, as appropriate, once her 

medication list has been verified and reconciled.

## 2020-06-23 LAB
ADD MANUAL DIFF: NO
ALBUMIN SERPL-MCNC: 2.6 G/DL (ref 3.5–5)
ALP SERPL-CCNC: 107 U/L (ref 38–126)
ANION GAP SERPL CALC-SCNC: 6 MMOL/L (ref 5–19)
AST SERPL-CCNC: 16 U/L (ref 14–36)
BASOPHILS # BLD AUTO: 0.1 10^3/UL (ref 0–0.2)
BASOPHILS NFR BLD AUTO: 1.1 % (ref 0–2)
BILIRUB DIRECT SERPL-MCNC: 0 MG/DL (ref 0–0.4)
BILIRUB SERPL-MCNC: 0.6 MG/DL (ref 0.2–1.3)
BUN SERPL-MCNC: 10 MG/DL (ref 7–20)
CALCIUM: 8.5 MG/DL (ref 8.4–10.2)
CHLORIDE SERPL-SCNC: 99 MMOL/L (ref 98–107)
CO2 SERPL-SCNC: 28 MMOL/L (ref 22–30)
EOSINOPHIL # BLD AUTO: 0.2 10^3/UL (ref 0–0.6)
EOSINOPHIL NFR BLD AUTO: 2.2 % (ref 0–6)
ERYTHROCYTE [DISTWIDTH] IN BLOOD BY AUTOMATED COUNT: 14 % (ref 11.5–14)
GLUCOSE SERPL-MCNC: 127 MG/DL (ref 75–110)
HCT VFR BLD CALC: 33.3 % (ref 36–47)
HGB BLD-MCNC: 10.8 G/DL (ref 12–15.5)
LYMPHOCYTES # BLD AUTO: 2.8 10^3/UL (ref 0.5–4.7)
LYMPHOCYTES NFR BLD AUTO: 28.5 % (ref 13–45)
MCH RBC QN AUTO: 31.3 PG (ref 27–33.4)
MCHC RBC AUTO-ENTMCNC: 32.5 G/DL (ref 32–36)
MCV RBC AUTO: 96 FL (ref 80–97)
MONOCYTES # BLD AUTO: 0.5 10^3/UL (ref 0.1–1.4)
MONOCYTES NFR BLD AUTO: 5.1 % (ref 3–13)
NEUTROPHILS # BLD AUTO: 6.2 10^3/UL (ref 1.7–8.2)
NEUTS SEG NFR BLD AUTO: 63.1 % (ref 42–78)
PLATELET # BLD: 314 10^3/UL (ref 150–450)
POTASSIUM SERPL-SCNC: 4.3 MMOL/L (ref 3.6–5)
PROT SERPL-MCNC: 5.1 G/DL (ref 6.3–8.2)
RBC # BLD AUTO: 3.47 10^6/UL (ref 3.72–5.28)
TOTAL CELLS COUNTED % (AUTO): 100 %
WBC # BLD AUTO: 9.9 10^3/UL (ref 4–10.5)

## 2020-06-23 PROCEDURE — 0HBDXZZ EXCISION OF RIGHT LOWER ARM SKIN, EXTERNAL APPROACH: ICD-10-PCS | Performed by: SURGERY

## 2020-06-23 RX ADMIN — NYSTATIN AND TRIAMCINOLONE ACETONIDE SCH APPLIC: 100000; 1 OINTMENT TOPICAL at 09:40

## 2020-06-23 RX ADMIN — APIXABAN SCH MG: 5 TABLET, FILM COATED ORAL at 18:35

## 2020-06-23 RX ADMIN — TOLTERODINE TARTRATE SCH MG: 1 TABLET, FILM COATED ORAL at 09:31

## 2020-06-23 RX ADMIN — AMITRIPTYLINE HYDROCHLORIDE SCH MG: 25 TABLET, FILM COATED ORAL at 21:37

## 2020-06-23 RX ADMIN — Medication SCH: at 05:23

## 2020-06-23 RX ADMIN — VENLAFAXINE HYDROCHLORIDE SCH MG: 75 CAPSULE, EXTENDED RELEASE ORAL at 09:30

## 2020-06-23 RX ADMIN — SODIUM CHLORIDE PRN MLS/HR: 9 INJECTION, SOLUTION INTRAVENOUS at 06:45

## 2020-06-23 RX ADMIN — TIZANIDINE SCH MG: 4 TABLET ORAL at 05:22

## 2020-06-23 RX ADMIN — INSULIN HUMAN SCH UNIT: 100 INJECTION, SOLUTION PARENTERAL at 17:10

## 2020-06-23 RX ADMIN — METOPROLOL SUCCINATE SCH: 50 TABLET, EXTENDED RELEASE ORAL at 21:39

## 2020-06-23 RX ADMIN — DOXAZOSIN SCH: 2 TABLET ORAL at 21:38

## 2020-06-23 RX ADMIN — PREGABALIN SCH MG: 75 CAPSULE ORAL at 21:36

## 2020-06-23 RX ADMIN — INSULIN HUMAN SCH: 100 INJECTION, SOLUTION PARENTERAL at 12:53

## 2020-06-23 RX ADMIN — DIPHENHYDRAMINE HYDROCHLORIDE SCH MG: 25 CAPSULE ORAL at 21:38

## 2020-06-23 RX ADMIN — ZOLPIDEM TARTRATE SCH MG: 5 TABLET ORAL at 21:40

## 2020-06-23 RX ADMIN — LINEZOLID SCH MG: 600 TABLET, FILM COATED ORAL at 21:39

## 2020-06-23 RX ADMIN — OXYCODONE AND ACETAMINOPHEN PRN TAB: 5; 325 TABLET ORAL at 05:22

## 2020-06-23 RX ADMIN — TIZANIDINE SCH MG: 4 TABLET ORAL at 14:55

## 2020-06-23 RX ADMIN — SODIUM CHLORIDE PRN MLS/HR: 9 INJECTION, SOLUTION INTRAVENOUS at 18:36

## 2020-06-23 RX ADMIN — INSULIN HUMAN SCH UNIT: 100 INJECTION, SOLUTION PARENTERAL at 08:09

## 2020-06-23 RX ADMIN — NYSTATIN AND TRIAMCINOLONE ACETONIDE SCH APPLIC: 100000; 1 OINTMENT TOPICAL at 18:47

## 2020-06-23 RX ADMIN — Medication SCH ML: at 14:57

## 2020-06-23 RX ADMIN — INSULIN HUMAN SCH: 100 INJECTION, SOLUTION PARENTERAL at 21:40

## 2020-06-23 RX ADMIN — NYSTATIN AND TRIAMCINOLONE ACETONIDE SCH APPLIC: 100000; 1 OINTMENT TOPICAL at 14:57

## 2020-06-23 RX ADMIN — PREGABALIN SCH MG: 75 CAPSULE ORAL at 09:31

## 2020-06-23 RX ADMIN — METOPROLOL SUCCINATE SCH: 50 TABLET, EXTENDED RELEASE ORAL at 12:38

## 2020-06-23 RX ADMIN — Medication SCH: at 21:39

## 2020-06-23 RX ADMIN — FUROSEMIDE SCH MG: 40 TABLET ORAL at 09:32

## 2020-06-23 RX ADMIN — TIZANIDINE SCH MG: 4 TABLET ORAL at 21:34

## 2020-06-23 RX ADMIN — TOLTERODINE TARTRATE SCH MG: 1 TABLET, FILM COATED ORAL at 21:39

## 2020-06-23 RX ADMIN — OXYCODONE AND ACETAMINOPHEN PRN TAB: 5; 325 TABLET ORAL at 16:12

## 2020-06-23 RX ADMIN — DOCUSATE SODIUM SCH MG: 100 CAPSULE, LIQUID FILLED ORAL at 09:31

## 2020-06-23 RX ADMIN — APIXABAN SCH MG: 5 TABLET, FILM COATED ORAL at 09:32

## 2020-06-23 RX ADMIN — LINEZOLID SCH MG: 600 TABLET, FILM COATED ORAL at 09:31

## 2020-06-23 RX ADMIN — LEVOFLOXACIN SCH MG: 750 TABLET, FILM COATED ORAL at 21:40

## 2020-06-23 RX ADMIN — DOCUSATE SODIUM SCH MG: 100 CAPSULE, LIQUID FILLED ORAL at 18:36

## 2020-06-23 RX ADMIN — PANTOPRAZOLE SODIUM SCH MG: 40 TABLET, DELAYED RELEASE ORAL at 05:22

## 2020-06-23 NOTE — PDOC PROGRESS REPORT
Subjective


Progress Note for:: 06/23/20


Subjective:: 





No adverse events overnight.  No new complaints.  Vital signs been stable.  She 

does not know very much about her diabetes management.  She kept uncovering the 

bandage on her left arm from her incision and debridement yesterday and we had 

to keep reminding her to leave it alone.


Reason For Visit: 


ACUTE KIDNEY INJURY, CELLULITIS WITH ABSCESS








Physical Exam


Vital Signs: 


                                        











Temp Pulse Resp BP Pulse Ox


 


 97.4 F   64   19   94/45 L  96 


 


 06/23/20 11:50  06/23/20 11:50  06/23/20 11:50  06/23/20 11:50  06/23/20 11:50








                                 Intake & Output











 06/22/20 06/23/20 06/24/20





 06:59 06:59 06:59


 


Intake Total 3600 3358 


 


Output Total 20 850 


 


Balance 3580 2508 


 


Weight 112.1 kg 112.1 kg 











General appearance: PRESENT: no acute distress, cooperative, disheveled, 

morbidly obese


Teeth exam: PRESENT: edentulous


Respiratory exam: PRESENT: clear to auscultation pallavi, symmetrical, unlabored.  

ABSENT: accessory muscle use, chest wall tenderness, crackles, prolonged 

expiratory phas, rhonchi, tachypnea, wheezes


Cardiovascular exam: PRESENT: RRR, +S1, +S2


Pulses: PRESENT: normal carotid pulses


Vascular exam: PRESENT: normal capillary refill


GI/Abdominal exam: PRESENT: normal bowel sounds, soft, other - Bandage over 

drained abscess on abdominal wall.  ABSENT: distended, guarding, tenderness


Extremities exam: PRESENT: other - Bandage over drained abscess on her right 

thigh and left forearm.  ABSENT: clubbing, pedal edema


Musculoskeletal exam: PRESENT: normal inspection.  ABSENT: deformity


Neurological exam: PRESENT: awake, oriented to person, oriented to place, 

oriented to situation - Somewhat


Psychiatric exam: PRESENT: flat affect





Results


Laboratory Results: 


                                        





                                 06/23/20 05:09 





                                 06/23/20 05:09 





                                        











  06/23/20 06/23/20





  05:09 05:09


 


WBC  9.9 


 


RBC  3.47 L 


 


Hgb  10.8 L 


 


Hct  33.3 L 


 


MCV  96 


 


MCH  31.3 


 


MCHC  32.5 


 


RDW  14.0 


 


Plt Count  314 


 


Seg Neutrophils %  63.1 


 


Sodium   133.0 L


 


Potassium   4.3


 


Chloride   99


 


Carbon Dioxide   28


 


Anion Gap   6


 


BUN   10


 


Creatinine   0.93


 


Est GFR (African Amer)   > 60


 


Glucose   127 H


 


Calcium   8.5


 


Magnesium   1.6


 


Total Bilirubin   0.6


 


AST   16


 


Alkaline Phosphatase   107


 


Total Protein   5.1 L


 


Albumin   2.6 L








                                        





06/20/20 17:20   Blood   Blood Culture (PCR) - Final


06/20/20 17:20   Blood   Blood Culture - Final


                            Micrococcus Species


06/20/20 17:08   Abdomen - Abscess   Gram Stain - Final


06/20/20 17:08   Abdomen - Abscess   Wound Culture - Final


                            Mrsa (Meth Resis Staph Aureus)


                            Group B Beta Streptococcus


                            Prevotella Species





                                        











  06/20/20





  15:35


 


Troponin I  < 0.012











Impressions: 


                                        





Abdomen/Pelvis CT  06/20/20 18:43


IMPRESSION:  Left pannus soft tissue injury with nonspecific radiodense material

seen at the defect site.  Surrounding inflammatory changes without abscess.  

Chronic and incidental intra-abdominal findings as above.


 














Assessment and Plan





- Diagnosis


(1) Abscess of right thigh


Is this a current diagnosis for this admission?: Yes   


Plan: 


Status post incision and drainage, on Zyvox and Levaquin for now.








(2) Acute kidney injury (nontraumatic)


Is this a current diagnosis for this admission?: Yes   


Plan: 


Resolved








(3) Cellulitis of left abdominal wall


Is this a current diagnosis for this admission?: Yes   


Plan: 


On antibiotics as previously noted








(4) Cutaneous abscess of abdominal wall


Is this a current diagnosis for this admission?: Yes   


Plan: 


Status post incision and drainage, on Zyvox and Levaquin








(5) Poorly controlled diabetes mellitus


Is this a current diagnosis for this admission?: Yes   


Plan: 


Currently on a sliding scale and diabetic diet.  Tried to emphasize the 

importance of keeping her blood sugar under control so she does not get these 

recurrent infections, but she displayed no outward evidence that she was very 

concerned about that particular piece of information.








(6) Morbid obesity with BMI of 40.0-44.9, adult


Is this a current diagnosis for this admission?: Yes   


Plan: 


Strongly encourage lifestyle modification








(7) Schizoaffective disorder


Qualifiers: 


   Schizoaffective disorder type: unspecified   Qualified Code(s): F25.9 - 

Schizoaffective disorder, unspecified   


Is this a current diagnosis for this admission?: Yes   


Plan: 


Home medications have been continued








(8) Abscess of multiple sites


Is this a current diagnosis for this admission?: Yes   


Plan: 


Specifically, her left forearm.  These were incised and drained on 6/22.  We are

awaiting identification and susceptibilities from microbiology.  Currently on 

Zyvox and Levaquin.








- Plan Summary


Summary: 


Patient is admitted to observation status on the medical floor where she will 

receive routine supportive and symptomatic cares.  She will be treated with oral

Levaquin and Zyvox.  An initial dose of intravenous insulin will be given to 

normalize her blood sugar.  She will receive IV fluids using lactated Ringer's 

at 250 mL/h x 12 hours.  Before meals and at bedtime Accu-Cheks will be 

performed and sliding scale insulin will be used for hyperglycemia with a 

hypoglycemic protocol in place.  She will use morphine sulfate 2 to 4 mg IV 

every 2 hours as needed for pain.  She will use Ativan 1 mg IV every 4 hours as 

needed for anxiety or restlessness.  She will be placed on a cardiac and 

diabetic restricted diet.  A CBC, metabolic profile, magnesium level, hemoglobin

A1c, thyroid profile, BNP and lipid profile will be obtained in the morning.  

The patient's home medications will be resumed, as appropriate, once her medica

tion list has been verified and reconciled.





- Time


Time Spent with patient: 25-34 minutes

## 2020-06-23 NOTE — PDOC PROGRESS REPORT
Subjective


Progress Note for:: 06/23/20


Reason For Visit: 


ACUTE KIDNEY INJURY, CELLULITIS WITH ABSCESS








Physical Exam


Vital Signs: 


                                        











Temp Pulse Resp BP Pulse Ox


 


 97.4 F   64   19   94/45 L  96 


 


 06/23/20 11:50  06/23/20 11:50  06/23/20 11:50  06/23/20 11:50  06/23/20 11:50








                                 Intake & Output











 06/22/20 06/23/20 06/24/20





 06:59 06:59 06:59


 


Intake Total 3600 3358 


 


Output Total 20 850 


 


Balance 3580 2508 


 


Weight 112.1 kg 112.1 kg 














Results


Laboratory Results: 


                                        





                                 06/23/20 05:09 





                                 06/23/20 05:09 





                                        











  06/23/20 06/23/20





  05:09 05:09


 


WBC  9.9 


 


RBC  3.47 L 


 


Hgb  10.8 L 


 


Hct  33.3 L 


 


MCV  96 


 


MCH  31.3 


 


MCHC  32.5 


 


RDW  14.0 


 


Plt Count  314 


 


Seg Neutrophils %  63.1 


 


Sodium   133.0 L


 


Potassium   4.3


 


Chloride   99


 


Carbon Dioxide   28


 


Anion Gap   6


 


BUN   10


 


Creatinine   0.93


 


Est GFR (African Amer)   > 60


 


Glucose   127 H


 


Calcium   8.5


 


Magnesium   1.6


 


Total Bilirubin   0.6


 


AST   16


 


Alkaline Phosphatase   107


 


Total Protein   5.1 L


 


Albumin   2.6 L








                                        





06/20/20 17:20   Blood   Blood Culture (PCR) - Final


06/20/20 17:20   Blood   Blood Culture - Final


                            Micrococcus Species


06/20/20 17:08   Abdomen - Abscess   Gram Stain - Final


06/20/20 17:08   Abdomen - Abscess   Wound Culture - Final


                            Mrsa (Meth Resis Staph Aureus)


                            Group B Beta Streptococcus


                            Prevotella Species





                                        











  06/20/20





  15:35


 


Troponin I  < 0.012











Impressions: 


                                        





Abdomen/Pelvis CT  06/20/20 18:43


IMPRESSION:  Left pannus soft tissue injury with nonspecific radiodense material

seen at the defect site.  Surrounding inflammatory changes without abscess.  

Chronic and incidental intra-abdominal findings as above.


 














Assessment & Plan





- Diagnosis


(1) Abscess of multiple sites


Is this a current diagnosis for this admission?: Yes   





- Plan Summary


Plan Summary: 





This is a 55-year-old female status post incision and drainage of multiple 

abscesses.  The patient is sleepy today, and does not want to converse.  I have 

examined all of her abscess sites.  They all appear clean, without active 

purulence or necrosis.  I will order dressing changes (damp to dry) twice daily.

 The patient should shower at least once per day with soap and water.  The 

patient will not require further debridements.  The patient is okay for 

discharge from a surgical standpoint.  Plan for  consult to 

arrange home dressing changes.  Surgery will sign off at this time.  Please 

renotify with any questions or concerns.

## 2020-06-23 NOTE — OPERATIVE REPORT
Operative Report


DATE OF SURGERY: 06/23/20


PREOPERATIVE DIAGNOSIS: 1.  Multiple left axillary and left hand abscesses.  2. 

Diabetes mellitus


POSTOPERATIVE DIAGNOSIS: Same


OPERATION: 1.  Excisional debridement of left axillary abscess x2.  2.  

Excisional debridement of left wrist abscess


SURGEON: SARA MERA


ANESTHESIA: Local


TISSUE REMOVED OR ALTERED: Dead tissue


INTRAOPERATIVE FINDINGS: See below


PROCEDURE: 





The procedure performed at bedside in room 419.  The left arm was abducted, left

axilla shaved of hair, prepped with Betadine.  Surgical timeout conducted.





There were 2 abscesses 1 in the lateral aspect of the axilla and one in the 

medial aspect of the axilla.  Both were anesthetized 1% plain lidocaine.  Both 

were debrided with excisions of skin in an elliptical fashion approximately 1-

1/2 x 2 cm each.  Subcutaneous tissue was debrided with curette and index 

finger.  Wounds irrigated with peroxide and packed with portions of gauze.





The identical procedure was performed over the dorsal aspect of the left wrist. 

This abscess was approximately 1 cm in diameter.  It was excisionally excised, 

debrided with hemostats, irrigated and packed with gauze.





4 x 4's applied.  Patient tolerated procedure well.

## 2020-06-24 VITALS — DIASTOLIC BLOOD PRESSURE: 59 MMHG | SYSTOLIC BLOOD PRESSURE: 92 MMHG

## 2020-06-24 RX ADMIN — OXYCODONE AND ACETAMINOPHEN PRN TAB: 5; 325 TABLET ORAL at 00:01

## 2020-06-24 RX ADMIN — APIXABAN SCH MG: 5 TABLET, FILM COATED ORAL at 09:44

## 2020-06-24 RX ADMIN — NYSTATIN AND TRIAMCINOLONE ACETONIDE SCH APPLIC: 100000; 1 OINTMENT TOPICAL at 09:51

## 2020-06-24 RX ADMIN — INSULIN HUMAN SCH: 100 INJECTION, SOLUTION PARENTERAL at 09:35

## 2020-06-24 RX ADMIN — TOLTERODINE TARTRATE SCH MG: 1 TABLET, FILM COATED ORAL at 09:40

## 2020-06-24 RX ADMIN — TIZANIDINE SCH MG: 4 TABLET ORAL at 05:00

## 2020-06-24 RX ADMIN — SODIUM CHLORIDE PRN MLS/HR: 9 INJECTION, SOLUTION INTRAVENOUS at 04:50

## 2020-06-24 RX ADMIN — INSULIN HUMAN SCH UNIT: 100 INJECTION, SOLUTION PARENTERAL at 12:08

## 2020-06-24 RX ADMIN — PANTOPRAZOLE SODIUM SCH MG: 40 TABLET, DELAYED RELEASE ORAL at 05:00

## 2020-06-24 RX ADMIN — PREGABALIN SCH MG: 75 CAPSULE ORAL at 09:40

## 2020-06-24 RX ADMIN — VENLAFAXINE HYDROCHLORIDE SCH MG: 75 CAPSULE, EXTENDED RELEASE ORAL at 09:40

## 2020-06-24 RX ADMIN — Medication SCH: at 05:00

## 2020-06-24 RX ADMIN — FUROSEMIDE SCH MG: 40 TABLET ORAL at 09:45

## 2020-06-24 RX ADMIN — LINEZOLID SCH MG: 600 TABLET, FILM COATED ORAL at 09:40

## 2020-06-24 RX ADMIN — METOPROLOL SUCCINATE SCH MG: 50 TABLET, EXTENDED RELEASE ORAL at 09:41

## 2020-06-24 RX ADMIN — DOCUSATE SODIUM SCH MG: 100 CAPSULE, LIQUID FILLED ORAL at 09:41

## 2020-06-24 NOTE — PDOC DISCHARGE SUMMARY
Impression





- Admit/DC Date/PCP


Admission Date/Primary Care Provider: 


  06/21/20 13:21





  RIKI ALAS, 





Discharge Date: 06/24/20





- Discharge Diagnosis


(1) Abscess of right thigh


Is this a current diagnosis for this admission?: Yes   





(2) Acute kidney injury (nontraumatic)


Is this a current diagnosis for this admission?: Yes   





(3) Cellulitis of left abdominal wall


Is this a current diagnosis for this admission?: Yes   





(4) Cutaneous abscess of abdominal wall


Is this a current diagnosis for this admission?: Yes   





(5) Poorly controlled diabetes mellitus


Is this a current diagnosis for this admission?: Yes   





(6) Morbid obesity with BMI of 40.0-44.9, adult


Is this a current diagnosis for this admission?: Yes   





(7) Schizoaffective disorder


Is this a current diagnosis for this admission?: Yes   





(8) Abscess of multiple sites


Is this a current diagnosis for this admission?: Yes   





- Assessment


Summary: 


Patient is admitted to observation status on the medical floor where she will 

receive routine supportive and symptomatic cares.  She will be treated with oral

Levaquin and Zyvox.  An initial dose of intravenous insulin will be given to 

normalize her blood sugar.  She will receive IV fluids using lactated Ringer's 

at 250 mL/h x 12 hours.  Before meals and at bedtime Accu-Cheks will be pe

rformed and sliding scale insulin will be used for hyperglycemia with a 

hypoglycemic protocol in place.  She will use morphine sulfate 2 to 4 mg IV 

every 2 hours as needed for pain.  She will use Ativan 1 mg IV every 4 hours as 

needed for anxiety or restlessness.  She will be placed on a cardiac and 

diabetic restricted diet.  A CBC, metabolic profile, magnesium level, hemoglobin

A1c, thyroid profile, BNP and lipid profile will be obtained in the morning.  

The patient's home medications will be resumed, as appropriate, once her 

medication list has been verified and reconciled.





- Additional Information


Resuscitation Status: Full Code


Discharge Diet: Cardiac, Diabetic


Discharge Activity: Other


Referrals: 


SARA MERA MD [ACTIVE STAFF] - 06/30/20 8:15 am (7-10 days)


Prescriptions: 


Metronidazole [Flagyl 500 mg Tablet] 500 mg PO Q6H #40 tablet


Linezolid [Zyvox 600 mg Tablet] 600 mg PO Q12 #10 tablet


Home Medications: 








Apixaban [Eliquis 5 mg Tablet] 5 mg PO Q12 12/20/17 


Zolpidem Tartrate [Ambien] 10 mg PO QHS 12/20/17 


Brexpiprazole [Rexulti] 2 mg PO QHS 11/16/18 


Solifenacin Succinate [Vesicare] 10 mg PO DAILY 11/16/18 


Oxycodone HCl/Acetaminophen [Percocet 5-325 mg Tablet] 1 tab PO Q8HP PRN 

03/22/20 


Prazosin HCl [Minipress] 2 mg PO QHS 03/22/20 


Tizanidine HCl 6 mg PO Q8 03/22/20 


Metoprolol Succinate [Toprol Xl 25 mg Tab.sr] 50 mg PO Q12 30 Days 03/31/20 


Diphenhydramine HCl [Benadryl] 50 mg PO QHS 06/21/20 


Furosemide [Lasix 40 mg Tablet] 20 mg PO DAILY 06/21/20 


Lisinopril [Prinivil 5 mg Tablet] 5 mg PO DAILY 06/21/20 


Pregabalin [Lyrica] 225 mg PO Q12 06/21/20 


Acetaminophen [Tylenol 325 mg Tablet] 650 mg PO Q4HP PRN  tablet 06/24/20 


Linezolid [Zyvox 600 mg Tablet] 600 mg PO Q12 #10 tablet 06/24/20 


Metronidazole [Flagyl 500 mg Tablet] 500 mg PO Q6H #40 tablet 06/24/20 











History of Present Illiness


History of Present Illness: 


EVERARDO GEORGE is a 55 year old female who presented to the emergency room with a

2-week history of "skin sores".  She admits to the gradual development of 3 

areas of "skin sores" over the last 2 weeks.  The first area erupted on her 

right thigh with redness being present constantly and gradually spreading 

becoming more tender and eventually opening to drain pus.  The second area began

on her left abdominal pannus region 1 week ago and has gradually increased in 

redness and tenderness.  The third area is in her gerardo-labial groin and has been

gradually developing over the last 2 days.  She denies associated or 

accompanying signs and symptoms.  She admits prior similar episodes of lesser 

degree.  She has not identified any aggravating or ameliorating factors for her 

"skin sores".  In the emergency room she was found to have a draining abscess of

the right thigh and a new abscess of the left abdominal pannus which was incised

and drained with packing placed.  Her groin was evaluated and no abscess or 

obvious cellulitis was found, however intertrigo was noted.  Patient's white 

blood count was 13,000 and her blood sugar was 449.  She was started on IV 

antibiotic therapy with clindamycin and treated with insulin.  She was 

subsequently admitted observation status for further evaluation and treatment.








Hospital Course


Hospital Course: 


She had multiple abscess pockets debrided.  She had one on the right thigh, one 

on the left side of her abdominal pannus, and 2 on her left forearm.  The best 

specimen for culture was in the abdominal wall abscess.  It grew out 3 

organisms: MRSA, group B streptococcus, and Prevotella.  She had empirically 

been on Zyvox and Levaquin.  Based on the culture, we decided to send her on 

oral Zyvox and Flagyl.  The wounds did not require further packing, and surgery 

recommended wet-to-dry dressings twice a day.  It was not on her home medication

list, but she said that she has insulin at home, but does not always use it.  

Her blood sugars were fairly easily controlled with just a sliding scale.  She 

was counseled very strongly regarding the importance of conforming to her 

diabetic regimen.  She has follow-up arranged with general surgery in 7 to 10 

days.  Her labs and examination were reassuring and she was discharged in stable

condition.





Physical Exam


Vital Signs: 


                                        











Temp Pulse Resp BP Pulse Ox


 


 97.9 F   80   19   92/59 L  94 


 


 06/24/20 13:46  06/24/20 13:46  06/24/20 13:46  06/24/20 13:46  06/24/20 13:46








                                 Intake & Output











 06/23/20 06/24/20 06/25/20





 06:59 06:59 06:59


 


Intake Total 3358 3248 


 


Output Total 850  


 


Balance 2508 3248 


 


Weight 112.1 kg 109.5 kg 








General appearance: PRESENT: no acute distress, cooperative, disheveled, 

morbidly obese


Teeth exam: PRESENT: edentulous


Respiratory exam: PRESENT: clear to auscultation pallavi, symmetrical, unlabored.  

ABSENT: accessory muscle use, chest wall tenderness, crackles, prolonged 

expiratory phas, rhonchi, tachypnea, wheezes


Cardiovascular exam: PRESENT: RRR, +S1, +S2


Pulses: PRESENT: normal carotid pulses


Vascular exam: PRESENT: normal capillary refill


GI/Abdominal exam: PRESENT: normal bowel sounds, soft, other - Bandage over 

drained abscess on abdominal wall.  ABSENT: distended, guarding, tenderness


Extremities exam: PRESENT: other - Bandage over drained abscess on her right 

thigh and left forearm.  ABSENT: clubbing, pedal edema


Musculoskeletal exam: PRESENT: normal inspection.  ABSENT: deformity


Neurological exam: PRESENT: awake, oriented to person, oriented to place, 

oriented to situation - Somewhat


Psychiatric exam: PRESENT: flat affect





Results


Laboratory Results: 


                                        











WBC  9.9 10^3/uL (4.0-10.5)   06/23/20  05:09    


 


RBC  3.47 10^6/uL (3.72-5.28)  L  06/23/20  05:09    


 


Hgb  10.8 g/dL (12.0-15.5)  L  06/23/20  05:09    


 


Hct  33.3 % (36.0-47.0)  L  06/23/20  05:09    


 


MCV  96 fl (80-97)   06/23/20  05:09    


 


MCH  31.3 pg (27.0-33.4)   06/23/20  05:09    


 


MCHC  32.5 g/dL (32.0-36.0)   06/23/20  05:09    


 


RDW  14.0 % (11.5-14.0)   06/23/20  05:09    


 


Plt Count  314 10^3/uL (150-450)   06/23/20  05:09    


 


Lymph % (Auto)  28.5 % (13-45)   06/23/20  05:09    


 


Mono % (Auto)  5.1 % (3-13)   06/23/20  05:09    


 


Eos % (Auto)  2.2 % (0-6)   06/23/20  05:09    


 


Baso % (Auto)  1.1 % (0-2)   06/23/20  05:09    


 


Absolute Neuts (auto)  6.2 10^3/uL (1.7-8.2)   06/23/20  05:09    


 


Absolute Lymphs (auto)  2.8 10^3/uL (0.5-4.7)   06/23/20  05:09    


 


Absolute Monos (auto)  0.5 10^3/uL (0.1-1.4)   06/23/20  05:09    


 


Absolute Eos (auto)  0.2 10^3/uL (0.0-0.6)   06/23/20  05:09    


 


Absolute Basos (auto)  0.1 10^3/uL (0.0-0.2)   06/23/20  05:09    


 


Seg Neutrophils %  63.1 % (42-78)   06/23/20  05:09    


 


Sodium  133.0 mmol/L (137-145)  L  06/23/20  05:09    


 


Potassium  4.3 mmol/L (3.6-5.0)   06/23/20  05:09    


 


Chloride  99 mmol/L ()   06/23/20  05:09    


 


Carbon Dioxide  28 mmol/L (22-30)   06/23/20  05:09    


 


Anion Gap  6  (5-19)   06/23/20  05:09    


 


BUN  10 mg/dL (7-20)   06/23/20  05:09    


 


Creatinine  0.93 mg/dL (0.52-1.25)   06/23/20  05:09    


 


Est GFR ( Amer)  > 60  (>60)   06/23/20  05:09    


 


Est GFR (MDRD) Non-Af  > 60  (>60)   06/23/20  05:09    


 


Glucose  127 mg/dL ()  H  06/23/20  05:09    


 


POC Glucose  187 mg/dL ()  H  06/24/20  11:33    


 


Hemoglobin A1c %  11.8 % (4.7-6.0)  H  06/21/20  06:27    


 


Calcium  8.5 mg/dL (8.4-10.2)   06/23/20  05:09    


 


Magnesium  1.6 mg/dL (1.6-2.3)   06/23/20  05:09    


 


Total Bilirubin  0.6 mg/dL (0.2-1.3)   06/23/20  05:09    


 


Direct Bilirubin  0.0 mg/dL (0.0-0.4)   06/23/20  05:09    


 


Neonat Total Bilirubin  Not Reportable   06/23/20  05:09    


 


Neonat Direct Bilirubin  Not Reportable   06/23/20  05:09    


 


Neonat Indirect Bili  Not Reportable   06/23/20  05:09    


 


AST  16 U/L (14-36)   06/23/20  05:09    


 


ALT  8 U/L (<35)   06/23/20  05:09    


 


Alkaline Phosphatase  107 U/L ()   06/23/20  05:09    


 


Troponin I  < 0.012 ng/mL  06/20/20  15:35    


 


Total Protein  5.1 g/dL (6.3-8.2)  L  06/23/20  05:09    


 


Albumin  2.6 g/dL (3.5-5.0)  L  06/23/20  05:09    


 


Triglycerides  175 mg/dL (<150)  H  06/21/20  06:27    


 


Cholesterol  183.45 mg/dL (0-200)   06/21/20  06:27    


 


LDL Cholesterol Direct  104 mg/dL (<100)  H  06/21/20  06:27    


 


VLDL Cholesterol  35.0 mg/dL (10-31)  H  06/21/20  06:27    


 


HDL Cholesterol  41 mg/dL (>40)   06/21/20  06:27    


 


TSH  4.63 uIU/mL (0.47-4.68)   06/21/20  06:27    


 


Urine Color  YELLOW   06/20/20  16:29    


 


Urine Appearance  SLIGHTLY-CLOUDY   06/20/20  16:29    


 


Urine pH  5.0  (5.0-9.0)   06/20/20  16:29    


 


Ur Specific Gravity  1.014   06/20/20  16:29    


 


Urine Protein  NEGATIVE mg/dL (NEGATIVE)   06/20/20  16:29    


 


Urine Glucose (UA)  >=500 mg/dL (NEGATIVE)  H  06/20/20  16:29    


 


Urine Ketones  NEGATIVE mg/dL (NEGATIVE)   06/20/20  16:29    


 


Urine Blood  LARGE  (NEGATIVE)  H  06/20/20  16:29    


 


Urine Nitrite  NEGATIVE  (NEGATIVE)   06/20/20  16:29    


 


Urine Bilirubin  NEGATIVE  (NEGATIVE)   06/20/20  16:29    


 


Urine Urobilinogen  NEGATIVE mg/dL (<2.0)   06/20/20  16:29    


 


Ur Leukocyte Esterase  MODERATE  (NEGATIVE)  H  06/20/20  16:29    


 


Urine WBC (Auto)  3 /HPF  06/20/20  16:29    


 


Urine RBC (Auto)  10 /HPF  06/20/20  16:29    


 


Urine Bacteria (Auto)  TRACE /HPF  06/20/20  16:29    


 


Squamous Epi Cells Auto  4 /HPF  06/20/20  16:29    


 


Urine Mucus (Auto)  RARE /LPF  06/20/20  16:29    


 


Urine Ascorbic Acid  NEGATIVE  (NEGATIVE)   06/20/20  16:29    


 


SARS-CoV-2 (PCR)  NEGATIVE  (NEGATIVE)   06/21/20  10:30    








                                        











  06/20/20





  15:35


 


Troponin I  < 0.012











Impressions: 


                                        





Abdomen/Pelvis CT  06/20/20 18:43


IMPRESSION:  Left pannus soft tissue injury with nonspecific radiodense material

seen at the defect site.  Surrounding inflammatory changes without abscess.  

Chronic and incidental intra-abdominal findings as above.


 














Plan


Time Spent: Greater than 30 Minutes





Stroke


Is this a Stroke Patient?: No





Acute Heart Failure





- **


Is this a Heart Failure Patient?: No

## 2020-07-25 ENCOUNTER — HOSPITAL ENCOUNTER (EMERGENCY)
Dept: HOSPITAL 62 - ER | Age: 55
LOS: 1 days | Discharge: HOME | End: 2020-07-26
Payer: MEDICAID

## 2020-07-25 DIAGNOSIS — Z88.0: ICD-10-CM

## 2020-07-25 DIAGNOSIS — L73.2: Primary | ICD-10-CM

## 2020-07-25 DIAGNOSIS — I50.9: ICD-10-CM

## 2020-07-25 DIAGNOSIS — L02.412: ICD-10-CM

## 2020-07-25 DIAGNOSIS — E11.9: ICD-10-CM

## 2020-07-25 DIAGNOSIS — Z79.84: ICD-10-CM

## 2020-07-25 DIAGNOSIS — Z88.2: ICD-10-CM
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
## 2020-07-25 DIAGNOSIS — L02.01: ICD-10-CM

## 2020-07-25 DIAGNOSIS — S90.32XA: ICD-10-CM

## 2020-07-25 DIAGNOSIS — Z88.6: ICD-10-CM

## 2020-07-25 DIAGNOSIS — I11.0: ICD-10-CM

## 2020-07-25 DIAGNOSIS — I48.91: ICD-10-CM

## 2020-07-25 LAB
ADD MANUAL DIFF: NO
BASOPHILS # BLD AUTO: 0.1 10^3/UL (ref 0–0.2)
BASOPHILS NFR BLD AUTO: 1 % (ref 0–2)
EOSINOPHIL # BLD AUTO: 0.1 10^3/UL (ref 0–0.6)
EOSINOPHIL NFR BLD AUTO: 1.2 % (ref 0–6)
ERYTHROCYTE [DISTWIDTH] IN BLOOD BY AUTOMATED COUNT: 15.1 % (ref 11.5–14)
HCT VFR BLD CALC: 39.9 % (ref 36–47)
HGB BLD-MCNC: 13 G/DL (ref 12–15.5)
LYMPHOCYTES # BLD AUTO: 2.5 10^3/UL (ref 0.5–4.7)
LYMPHOCYTES NFR BLD AUTO: 25 % (ref 13–45)
MCH RBC QN AUTO: 31.3 PG (ref 27–33.4)
MCHC RBC AUTO-ENTMCNC: 32.6 G/DL (ref 32–36)
MCV RBC AUTO: 96 FL (ref 80–97)
MONOCYTES # BLD AUTO: 0.4 10^3/UL (ref 0.1–1.4)
MONOCYTES NFR BLD AUTO: 4.1 % (ref 3–13)
NEUTROPHILS # BLD AUTO: 6.8 10^3/UL (ref 1.7–8.2)
NEUTS SEG NFR BLD AUTO: 68.7 % (ref 42–78)
PLATELET # BLD: 353 10^3/UL (ref 150–450)
RBC # BLD AUTO: 4.16 10^6/UL (ref 3.72–5.28)
TOTAL CELLS COUNTED % (AUTO): 100 %
WBC # BLD AUTO: 9.8 10^3/UL (ref 4–10.5)

## 2020-07-25 PROCEDURE — 99283 EMERGENCY DEPT VISIT LOW MDM: CPT

## 2020-07-25 PROCEDURE — 36415 COLL VENOUS BLD VENIPUNCTURE: CPT

## 2020-07-25 PROCEDURE — 73630 X-RAY EXAM OF FOOT: CPT

## 2020-07-25 PROCEDURE — 85025 COMPLETE CBC W/AUTO DIFF WBC: CPT

## 2020-07-25 NOTE — RADIOLOGY REPORT (SQ)
EXAM DESCRIPTION:  FOOT LEFT COMPLETE



IMAGES COMPLETED DATE/TIME:  7/25/2020 4:18 pm



REASON FOR STUDY:  Blunt trauma

 .  Dropped a can on foot.



COMPARISON:  None.



NUMBER OF VIEWS:  Three views.



TECHNIQUE:  AP, lateral and oblique  radiographic images acquired of the left foot.



LIMITATIONS:  None.



FINDINGS:  MINERALIZATION: Normal.

BONES: No acute fracture or dislocation.  No worrisome bone lesions.

JOINTS: No effusions.

SOFT TISSUES: No soft tissue swelling.  No foreign body.

OTHER: No other significant finding.



IMPRESSION:  No radiographic abnormality of the left foot.



TECHNICAL DOCUMENTATION:  JOB ID:  [id number]

 2011 [employer]- All Rights Reserved



Reading location - IP/workstation name: [email or web address]

## 2020-07-25 NOTE — ER DOCUMENT REPORT
ED Medical Screen (RME)





- General


Chief Complaint: Foot Injury


Stated Complaint: LEFT FOOT INJURY, SORES ON SKIN


Time Seen by Provider: 07/25/20 16:47


Primary Care Provider: 


RIKI ALAS DO [Primary Care Provider] - Follow up as needed


Mode of Arrival: Ambulatory


Information source: Patient


Notes: 





Patient is a 55-year-old female comes emergency room with multiple complaints.  

First patient states she dropped several cans of soup on her left foot.  She is 

complaining of left foot pain and difficulty ambulating.  Her next complaint is 

multiple abscesses she has a history of them in the past and these have been 

here for the past 2 to 3 days.  She has one on the right side of her face and 

multiple under her left axilla.  She states that they hurt.





Patient is a well-nourished well-developed morbidly obese 55-year-old female was

in no apparent distress on physical exam today but does appear somewhat 

uncomfortable.


Cardiac: Regular rate and rhythm no murmurs.


Lungs: Bilateral breath sounds increased clear to auscultation.


Skin examination patient's area concern is her left axilla as well as her right 

cheek.  On patient's right cheek she has approximately a 1 cm fluctuant mass 

that is warm to touch with a center pustule.  Other area of concern is her left 

axilla.  Patient has some areas of scarring under the left axilla but appears 

that she has 2 prominent fluctuant areas under that axilla.  1 is approximately 

4  centimeters long by 1 and half centimeters wide and depth is unknown.  The 

next one is slightly smaller from palpation.


Lower extremities: Examination patient's left foot shows she has an area of 

redness in the middle of her metatarsal area and some tenderness at the proximal

end of the metatarsals.  There is no overt ecchymosis or swelling noted at this 

time but moderate moderate tenderness in those areas to palpate.





I have greeted and performed a rapid initial assessment of this patient.  A 

comprehensive ED assessment and evaluation of the patient, analysis of test 

results and completion of the medical decision making process will be conducted 

by additional ED providers.  Dictation of this chart was performed using voice 

recognition software; therefore, there may be some unintended grammatical 

errors.


TRAVEL OUTSIDE OF THE [place] IN LAST 30 DAYS: No





- Related Data


Allergies/Adverse Reactions: 


                                        





tramadol Allergy (Intermediate, Verified 07/25/20 16:45)


   AMS


Penicillins Allergy (Mild, Verified 07/25/20 16:45)


   rash


Sulfa (Sulfonamide Antibiotics) Allergy (Mild, Verified 07/25/20 16:45)


   rash


aspirin Allergy (Verified 07/25/20 16:45)


   NO ASA











Past Medical History





- Past Medical History


Cardiac Medical History: Reports: Hx Atrial Fibrillation, Hx Congestive Heart 

Failure, Hx Hypercholesterolemia, Hx Hypertension


   Denies: Hx Coronary Artery Disease, Hx DVT, Hx Heart Attack, Hx Pulmonary 

Embolism


Pulmonary Medical History: Reports: Hx Asthma, Hx Bronchitis


   Denies: Hx COPD, Hx Pneumonia


Neurological Medical History: Reports: Hx Migraine.  Denies: Hx Cerebrovascular 

Accident, Hx Seizures


Endocrine Medical History: Reports: Hx Diabetes Mellitus Type 2 - Poorly 

compliant with medications and diet, Hx Hypothyroidism.  Denies: Hx Diabetes 

Mellitus Type 1, Hx Hyperthyroidism


Renal/ Medical History: Denies: Hx Peritoneal Dialysis


GI Medical History: Reports: Hx Gastritis, Hx Gastroesophageal Reflux Disease.  

Denies: Hx Cirrhosis, Hx Crohn's Disease, Hx Hepatitis, Hx Ulcerative Colitis


Musculoskeltal Medical History: Reports Hx Arthritis, Denies Hx Gout, Reports Hx

Musculoskeletal Trauma


Skin Medical History: Denies Hx Eczema, Denies Hx Psoriasis


Psychiatric Medical History: Reports: Hx Anxiety, Hx Depression, Hx 

Schizoaffective Disorder, Hx Schizophrenia - schizo affective


Infectious Medical History: Denies: Hx Hepatitis


Past Surgical History: Reports: Hx Cholecystectomy, Hx Tubal Ligation.  Denies: 

Hx Hysterectomy





- Immunizations


Immunizations up to date: Yes


Hx Diphtheria, Pertussis, Tetanus Vaccination: Yes - 2013





Physical Exam





- Vital signs


Vitals: 


                                        











Temp Pulse BP Pulse Ox


 


 98.9 F   80   123/62   97 


 


 07/25/20 16:45  07/25/20 16:45  07/25/20 16:45  07/25/20 16:45














Course





- Vital Signs


Vital signs: 


                                        











Temp Pulse Resp BP Pulse Ox


 


 98.9 F   80      123/62   97 


 


 07/25/20 16:45  07/25/20 16:45     07/25/20 16:45  07/25/20 16:45














Doctor's Discharge





- Discharge


Referrals: 


RIKI ALAS DO [Primary Care Provider] - Follow up as needed

## 2020-07-26 VITALS — DIASTOLIC BLOOD PRESSURE: 64 MMHG | SYSTOLIC BLOOD PRESSURE: 120 MMHG

## 2020-07-26 NOTE — ER DOCUMENT REPORT
Entered by CIARA IYER SCRIBE  20 0125 





Acting as scribe for:LA AGUIRRE IV, MD





ED General





- General


Chief Complaint: Abscess


Stated Complaint: LEFT FOOT INJURY, SORES ON SKIN


Time Seen by Provider: 20 16:47


Primary Care Provider: 


ARNAUD BROWNLEE MD [ACTIVE STAFF] - 20 (call to arrange follow up 

appointment)


RIKI ALAS DO [Primary Care Provider] - Follow up as needed


Mode of Arrival: Wheelchair


Information source: Patient


Notes: 





This 55 year old female patient presents to the ED today with complaints of abs

cess to her right cheek and left axilla that developed about x2-3 days ago. 

Patient notes a history of abscesses in the past that have needed surgical 

intervention. She also reports left foot pain after she dropped a can on it 

yesterday. Denies any other complaints.


TRAVEL OUTSIDE OF THE [place] IN LAST 30 DAYS: No





- Related Data


Allergies/Adverse Reactions: 


                                        





tramadol Allergy (Intermediate, Verified 20 16:45)


   AMS


Penicillins Allergy (Mild, Verified 20 16:45)


   rash


Sulfa (Sulfonamide Antibiotics) Allergy (Mild, Verified 20 16:45)


   rash


aspirin Allergy (Verified 20 16:45)


   NO ASA











Past Medical History





- General


Information source: Patient





- Social History


Smoking Status: Former Smoker


Cigarette use (# per day): No


Chew tobacco use (# tins/day): No


Smoking Education Provided: No


Frequency of alcohol use: None


Drug Abuse: None


Family History: Reviewed & Not Pertinent, CAD, CVA, DM, Hyperlipidemia, 

Hypertension, Malignancy, Thyroid Disfunction, Other - Asthma


Patient has suicidal ideation: No


Patient has homicidal ideation: No





- Past Medical History


Cardiac Medical History: Reports: Hx Atrial Fibrillation, Hx Congestive Heart 

Failure, Hx Hypercholesterolemia, Hx Hypertension


Pulmonary Medical History: Reports: Hx Asthma, Hx Bronchitis


Neurological Medical History: Reports: Hx Migraine


Endocrine Medical History: Reports: Hx Diabetes Mellitus Type 2 - Poorly 

compliant with medications and diet, Hx Hypothyroidism


GI Medical History: Reports: Hx Gastritis, Hx Gastroesophageal Reflux Disease


Musculoskeletal Medical History: Reports Hx Arthritis, Reports Hx 

Musculoskeletal Trauma


Psychiatric Medical History: Reports: Hx Anxiety, Hx Depression, Hx 

Schizoaffective Disorder, Hx Schizophrenia - schizo affective


Past Surgical History: Reports: Hx Cholecystectomy, Hx Tubal Ligation





- Immunizations


Immunizations up to date: Yes


Hx Diphtheria, Pertussis, Tetanus Vaccination: Yes - 





Review of Systems





- Review of Systems


Constitutional: No symptoms reported


EENT: No symptoms reported


Cardiovascular: No symptoms reported


Respiratory: No symptoms reported


Gastrointestinal: No symptoms reported


Genitourinary: No symptoms reported


Female Genitourinary: No symptoms reported


Musculoskeletal: See HPI


Skin: See HPI


Hematologic/Lymphatic: No symptoms reported


Neurological/Psychological: No symptoms reported


-: Yes All other systems reviewed and negative





Physical Exam





- Vital signs


Vitals: 


                                        











Temp Pulse BP Pulse Ox


 


 98.9 F   80   123/62   97 


 


 20 16:45  20 16:45  20 16:45  20 16:45











Interpretation: Normal





- General


General appearance: Alert


In distress: None





- HEENT


Head: Normocephalic, Atraumatic


Eyes: Normal


Pupils: PERRL





- Respiratory


Respiratory status: No respiratory distress


Chest status: Nontender


Breath sounds: Normal


Chest palpation: Normal





- Cardiovascular


Rhythm: Regular


Heart sounds: Normal auscultation


Murmur: No


Friction rub: No


Gallop: None auscultated





- Abdominal


Inspection: Normal


Distension: No distension


Bowel sounds: Normal


Tenderness: Nontender - Abdomen soft


Organomegaly: No organomegaly





- Back


Back: Normal, Nontender





- Extremities


General upper extremity: Normal inspection


Foot: Abrasion - small, left foot, Ecchymosis - left foot





- Neurological


Neuro grossly intact: Yes


Orientation: AAOx4


[place] Coma Scale Eye Opening: Spontaneous


[place] Coma Scale Verbal: Oriented


[place] Coma Scale Motor: Obeys Commands


[place] Coma Scale Total: 15





- Psychological


Associated symptoms: Normal affect, Normal mood





- Skin


Skin irregularity: other - Couple areas of chronic skin changes associated with 

hidradenitis suppurativa. 1 abscess noted in the left axilla is spontaneously 

draining. Pustule noted to right cheek that appears to be excoriated; there is a

small amount of pointing that appears to be amenable to warm compresses





Course





- Re-evaluation


Re-evalutation: 





20 01:45


Results of ED MSE discussed with patient.  Diagnoses, plan of care discussed 

with patient as well as follow-up.  All questions were answered prior to 

discharge.  Emergency signs and symptoms, reasons to return to the emergency 

department discussed with patient.





- Vital Signs


Vital signs: 


                                        











Temp Pulse Resp BP Pulse Ox


 


 98.9 F   80      123/62   97 


 


 20 16:45  20 16:45     20 16:45  20 16:45














- Laboratory


Result Diagrams: 


                                 20 17:45





Laboratory results interpreted by me: 


                                        











  20





  17:45


 


RDW  15.1 H














- Diagnostic Test


Radiology reviewed: Reports reviewed





Discharge





- Discharge


Clinical Impression: 


 Contusion of left foot, initial encounter, Hidradenitis axillaris





Disposition: HOME, SELF-CARE


Instructions:  Oral Narcotic Medication ([facility])


Additional Instructions: 


Return to the Emergency Department without delay if any worse.











HOME CARE INSTRUCTIONS & INFORMATION:  Thank you for choosing us for your 

medical needs. We hope you're satisfied with the care you received.  After you 

leave, you must properly care for your problem and, at the same time, observe 

its progress.  Any condition can change.  Some illnesses can change rapidly over

hours or days.  If your condition worsens, return to the Emergency Department or

see your physician promptly.





ABOUT YOUR X-RAYS AND EKG'S:   If you had an EKG or X-rays taken, they have been

read by the Emergency Physician. The X-rays and EKG's will also be read by a 

Radiologist or Cardiologist within 24 hours.  If discrepancies are noted, you 

will be notified by telephone.  Please be certain the ED has a correct telephone

number & address where you can be reached.  Also, realize that some fractures or

abnormalities do not show up on initial X-rays.  If your symptoms continue, see 

your physician.





ABOUT YOUR LABORATORY TEST:   If you had laboratory tests, the results have been

reviewed by the Emergency Physician.  Some test results (for example cultures) 

may not be available for several days.  You will be contacted if any test result

shows you need additional treatment.  Please be certain the ED has a correct 

telephone number and address where you can be reached.





ABOUT YOUR MEDICATIONS:  You will receive instructions on how to take your 

medicine on the prescription label you receive.  Additional information may be 

provided by the Pharmacy.  If you have questions afterwards, call the ED for 

clarification or further instructions.  Some prescribed medications may cause 

drowsiness.  Do not perform tasks such as driving a car or operating machinery 

without consulting your Pharmacist.  If you feel you need a refill of pain 

medication, your condition will need re-evaluation.  Please do not call for a 

refill of any medication.





ABOUT YOUR SIGNATURE:   Signature of this document acknowledges to followin. Understanding that you received emergency treatment and that you may be 

   released before al medical problems are known or treated. Please be certain  

   the ED has a correct phone number & address where you can be reached.


   2. Acknowledgement that you will arrange for follow-up care as recommended.


   3. Authorization for the Emergency Physician to provide information to your 

follow-up Physician in order to maximize your care.





AT ANY TIME, IF YOUR SYMPTOMS CHANGE SIGNIFICANTLY OR WORSEN OR YOU DEVELOP NEW 

SYMPTOMS, RETURN TO THE EMERGENCY DEPARTMENT IMMEDIATELY FOR RE-EVALUATION.





OUR GOAL IS TO PROVIDE EXCELLENT MEDICAL CARE!





WE HOPE THAT WE HAVE MET YOUR EXPECTATIONS DURING YOUR EMERGENCY DEPARTMENT 

VISIT AND THAT YOU FEEL YOU HAVE RECEIVED EXCELLENT CARE!





Contusion





     Your injury has resulted in a contusion -- a crushing of the deep tissues. 

No injury to important structures was detected during the physician's exam.  

Contusions vary in the amount of pain they cause, and in the length of time 

required for healing.  Typically, the area will become bruised, and will remain 

painful to touch for two or three weeks.  However, most patients are back to 

working and playing within a few days.


     After the initial period of rest and cold-packs, your symptoms (together 

with the doctor's recommendations) will determine how rapidly you can get back 

to full activity.  Usually this means "do what feels okay, but don't do things 

that hurt."


     If re-examination was recommended, it's important to follow up as 

instructed.  Call the doctor or return any time if pain increases, if swelling 

becomes severe, if you develop numbness or weakness in an injured extremity, or 

if any other alarming symptoms occur.








Prescriptions: 


Hydrocodone/Acetaminophen [Norco 5-325 mg Tablet] 1 tab PO Q6HP PRN #12 tablet


 PRN Reason: pain


Clindamycin HCl [Cleocin 150 mg Capsule] 450 mg PO TID 10 Days #90 capsule


Referrals: 


RIKI ALAS DO [Primary Care Provider] - Follow up as needed


ARNAUD BROWNLEE MD [ACTIVE STAFF] - 20 (call to arrange follow up 

appointment)





I personally performed the services described in the documentation, reviewed and

edited the documentation which was dictated to the scribe in my presence, and it

accurately records my words and actions.

## 2020-09-01 ENCOUNTER — HOSPITAL ENCOUNTER (INPATIENT)
Dept: HOSPITAL 62 - ER | Age: 55
LOS: 11 days | Discharge: HOME | DRG: 177 | End: 2020-09-12
Attending: HOSPITALIST | Admitting: FAMILY MEDICINE
Payer: MEDICAID

## 2020-09-01 DIAGNOSIS — J44.9: ICD-10-CM

## 2020-09-01 DIAGNOSIS — Z90.49: ICD-10-CM

## 2020-09-01 DIAGNOSIS — Z79.84: ICD-10-CM

## 2020-09-01 DIAGNOSIS — Z88.0: ICD-10-CM

## 2020-09-01 DIAGNOSIS — N17.9: ICD-10-CM

## 2020-09-01 DIAGNOSIS — N39.0: ICD-10-CM

## 2020-09-01 DIAGNOSIS — J96.01: ICD-10-CM

## 2020-09-01 DIAGNOSIS — Z83.3: ICD-10-CM

## 2020-09-01 DIAGNOSIS — E66.01: ICD-10-CM

## 2020-09-01 DIAGNOSIS — Z82.49: ICD-10-CM

## 2020-09-01 DIAGNOSIS — Z79.01: ICD-10-CM

## 2020-09-01 DIAGNOSIS — U07.1: Primary | ICD-10-CM

## 2020-09-01 DIAGNOSIS — Z88.6: ICD-10-CM

## 2020-09-01 DIAGNOSIS — I48.92: ICD-10-CM

## 2020-09-01 DIAGNOSIS — F25.9: ICD-10-CM

## 2020-09-01 DIAGNOSIS — F41.9: ICD-10-CM

## 2020-09-01 DIAGNOSIS — Z87.891: ICD-10-CM

## 2020-09-01 DIAGNOSIS — I48.0: ICD-10-CM

## 2020-09-01 DIAGNOSIS — Z82.3: ICD-10-CM

## 2020-09-01 DIAGNOSIS — E11.65: ICD-10-CM

## 2020-09-01 DIAGNOSIS — I11.0: ICD-10-CM

## 2020-09-01 DIAGNOSIS — I50.33: ICD-10-CM

## 2020-09-01 DIAGNOSIS — B95.4: ICD-10-CM

## 2020-09-01 DIAGNOSIS — E87.1: ICD-10-CM

## 2020-09-01 DIAGNOSIS — Z88.8: ICD-10-CM

## 2020-09-01 DIAGNOSIS — Z88.2: ICD-10-CM

## 2020-09-01 DIAGNOSIS — Z79.899: ICD-10-CM

## 2020-09-01 DIAGNOSIS — K21.9: ICD-10-CM

## 2020-09-01 DIAGNOSIS — J12.89: ICD-10-CM

## 2020-09-01 DIAGNOSIS — F32.9: ICD-10-CM

## 2020-09-01 LAB
ADD MANUAL DIFF: NO
ALBUMIN SERPL-MCNC: 3.5 G/DL (ref 3.5–5)
ALP SERPL-CCNC: 122 U/L (ref 38–126)
ANION GAP SERPL CALC-SCNC: 9 MMOL/L (ref 5–19)
APPEARANCE UR: (no result)
APTT PPP: YELLOW S
AST SERPL-CCNC: 25 U/L (ref 14–36)
BASOPHILS # BLD AUTO: 0 10^3/UL (ref 0–0.2)
BASOPHILS NFR BLD AUTO: 0.3 % (ref 0–2)
BILIRUB DIRECT SERPL-MCNC: 0.3 MG/DL (ref 0–0.4)
BILIRUB SERPL-MCNC: 0.7 MG/DL (ref 0.2–1.3)
BILIRUB UR QL STRIP: NEGATIVE
BUN SERPL-MCNC: 10 MG/DL (ref 7–20)
CALCIUM: 8.3 MG/DL (ref 8.4–10.2)
CHLORIDE SERPL-SCNC: 100 MMOL/L (ref 98–107)
CO2 SERPL-SCNC: 26 MMOL/L (ref 22–30)
CRP SERPL-MCNC: 224.1 MG/L (ref ?–10)
EOSINOPHIL # BLD AUTO: 0 10^3/UL (ref 0–0.6)
EOSINOPHIL NFR BLD AUTO: 0.1 % (ref 0–6)
ERYTHROCYTE [DISTWIDTH] IN BLOOD BY AUTOMATED COUNT: 15 % (ref 11.5–14)
FERRITIN SERPL-MCNC: 93.3 NG/ML (ref 11.1–264)
GLUCOSE SERPL-MCNC: 215 MG/DL (ref 75–110)
GLUCOSE UR STRIP-MCNC: >=500 MG/DL
HCT VFR BLD CALC: 39.3 % (ref 36–47)
HGB BLD-MCNC: 12.7 G/DL (ref 12–15.5)
INR PPP: 1.47
KETONES UR STRIP-MCNC: (no result) MG/DL
LYMPHOCYTES # BLD AUTO: 1.6 10^3/UL (ref 0.5–4.7)
LYMPHOCYTES NFR BLD AUTO: 19.1 % (ref 13–45)
MCH RBC QN AUTO: 30.2 PG (ref 27–33.4)
MCHC RBC AUTO-ENTMCNC: 32.4 G/DL (ref 32–36)
MCV RBC AUTO: 93 FL (ref 80–97)
MONOCYTES # BLD AUTO: 0.4 10^3/UL (ref 0.1–1.4)
MONOCYTES NFR BLD AUTO: 4.9 % (ref 3–13)
NEUTROPHILS # BLD AUTO: 6.2 10^3/UL (ref 1.7–8.2)
NEUTS SEG NFR BLD AUTO: 75.6 % (ref 42–78)
PH UR STRIP: 5 [PH] (ref 5–9)
PLATELET # BLD: 327 10^3/UL (ref 150–450)
POTASSIUM SERPL-SCNC: 4 MMOL/L (ref 3.6–5)
PROT SERPL-MCNC: 6.2 G/DL (ref 6.3–8.2)
PROT UR STRIP-MCNC: NEGATIVE MG/DL
PROTHROMBIN TIME: 18 SEC (ref 11.4–15.4)
RBC # BLD AUTO: 4.21 10^6/UL (ref 3.72–5.28)
SP GR UR STRIP: 1.01
TOTAL CELLS COUNTED % (AUTO): 100 %
UROBILINOGEN UR-MCNC: NEGATIVE MG/DL (ref ?–2)
WBC # BLD AUTO: 8.2 10^3/UL (ref 4–10.5)

## 2020-09-01 PROCEDURE — 82962 GLUCOSE BLOOD TEST: CPT

## 2020-09-01 PROCEDURE — 86140 C-REACTIVE PROTEIN: CPT

## 2020-09-01 PROCEDURE — 80053 COMPREHEN METABOLIC PANEL: CPT

## 2020-09-01 PROCEDURE — C9803 HOPD COVID-19 SPEC COLLECT: HCPCS

## 2020-09-01 PROCEDURE — 81001 URINALYSIS AUTO W/SCOPE: CPT

## 2020-09-01 PROCEDURE — 96361 HYDRATE IV INFUSION ADD-ON: CPT

## 2020-09-01 PROCEDURE — 84484 ASSAY OF TROPONIN QUANT: CPT

## 2020-09-01 PROCEDURE — 96365 THER/PROPH/DIAG IV INF INIT: CPT

## 2020-09-01 PROCEDURE — 84703 CHORIONIC GONADOTROPIN ASSAY: CPT

## 2020-09-01 PROCEDURE — 96375 TX/PRO/DX INJ NEW DRUG ADDON: CPT

## 2020-09-01 PROCEDURE — 83036 HEMOGLOBIN GLYCOSYLATED A1C: CPT

## 2020-09-01 PROCEDURE — 94640 AIRWAY INHALATION TREATMENT: CPT

## 2020-09-01 PROCEDURE — 85027 COMPLETE CBC AUTOMATED: CPT

## 2020-09-01 PROCEDURE — 80048 BASIC METABOLIC PNL TOTAL CA: CPT

## 2020-09-01 PROCEDURE — 82728 ASSAY OF FERRITIN: CPT

## 2020-09-01 PROCEDURE — 99285 EMERGENCY DEPT VISIT HI MDM: CPT

## 2020-09-01 PROCEDURE — 83605 ASSAY OF LACTIC ACID: CPT

## 2020-09-01 PROCEDURE — 36415 COLL VENOUS BLD VENIPUNCTURE: CPT

## 2020-09-01 PROCEDURE — 83690 ASSAY OF LIPASE: CPT

## 2020-09-01 PROCEDURE — 87088 URINE BACTERIA CULTURE: CPT

## 2020-09-01 PROCEDURE — 85610 PROTHROMBIN TIME: CPT

## 2020-09-01 PROCEDURE — 87086 URINE CULTURE/COLONY COUNT: CPT

## 2020-09-01 PROCEDURE — 71275 CT ANGIOGRAPHY CHEST: CPT

## 2020-09-01 PROCEDURE — 71045 X-RAY EXAM CHEST 1 VIEW: CPT

## 2020-09-01 PROCEDURE — 93005 ELECTROCARDIOGRAM TRACING: CPT

## 2020-09-01 PROCEDURE — 80162 ASSAY OF DIGOXIN TOTAL: CPT

## 2020-09-01 PROCEDURE — 87635 SARS-COV-2 COVID-19 AMP PRB: CPT

## 2020-09-01 PROCEDURE — 87040 BLOOD CULTURE FOR BACTERIA: CPT

## 2020-09-01 PROCEDURE — 93010 ELECTROCARDIOGRAM REPORT: CPT

## 2020-09-01 PROCEDURE — 85025 COMPLETE CBC W/AUTO DIFF WBC: CPT

## 2020-09-01 PROCEDURE — 83735 ASSAY OF MAGNESIUM: CPT

## 2020-09-01 PROCEDURE — 85379 FIBRIN DEGRADATION QUANT: CPT

## 2020-09-01 RX ADMIN — SODIUM CHLORIDE, SODIUM LACTATE, POTASSIUM CHLORIDE, AND CALCIUM CHLORIDE PRN MLS/HR: .6; .31; .03; .02 INJECTION, SOLUTION INTRAVENOUS at 23:58

## 2020-09-01 NOTE — RADIOLOGY REPORT (SQ)
CT CHEST ANGIOGRAPHY WITHOUT THEN WITH IV CONTRAST



HISTORY: Hypoxia. Chest pain. Fever.



COMPARISON: 3/21/2020



TECHNIQUE: CT angiogram of the chest with IV contrast. 3-D MIP

images were obtained in coronal and sagittal reconstructions.

This exam was performed according to our departmental

dose-optimization program, which includes automated exposure

control, adjustment of the mA and/or kV according to patient size

and/or use of iterative reconstruction technique. 



FINDINGS:



No filling defects are seen in the pulmonary trunk or the left

and right main pulmonary artery. There is limited evaluation of

the segmental branches due to motion artifact. 



There are multiple patchy groundglass opacities scattered

throughout both lungs in a predominantly peripheral distribution.

No pleural effusions or pneumothorax.



Normal heart size without pericardial effusion. No mediastinal

adenopathy is seen. The visualized portions of the upper abdomen

are unremarkable. The bony thorax is intact.



IMPRESSION:



1.  No central pulmonary embolism.

2.   Commonly reported imaging features of (COVID-19 or viral)

pneumonia are present. Other processes such as influenza

pneumonia and organizing pneumonia, as can be seen with drug

toxicity and connective tissue disease, can cause a similar

imaging pattern. [PneTyp]

## 2020-09-01 NOTE — ER DOCUMENT REPORT
Doctor's Note


Notes: 





09/01/20 21:31


CTA suggestive of COVID, neg PE. Discussed with Dr Weiland for admission.

## 2020-09-01 NOTE — ER DOCUMENT REPORT
ED Fever





- General


Chief Complaint: Fever


Stated Complaint: FEVER


Time Seen by Provider: 09/01/20 15:02


Primary Care Provider: 


RIKI ALAS DO [Primary Care Provider] - Follow up as needed


Information source: Patient


TRAVEL OUTSIDE OF THE [place] IN LAST 30 DAYS: No





- HPI


Patient complains to provider of: Fever


Onset: Just prior to arrival


Onset/Duration: Gradual


Quality of pain: Achy


Context: Diarrhea.  denies: Cough


Associated symptoms: Diarrhea, Fever, Headache, Shortness of breath.  denies: 

Chest pain, Earache, Vomiting, Rhinnorhea, Sore throat


Similar symptoms previously: No


Recently seen / treated by doctor: No


Notes: 





Patient is a 55-year-old female who presents with a fever.  She was at a routine

ENT appointment today and began to feel lightheaded.  Found to have a 

temperature of 102F and given Tylenol by EMS.  She was hypoxic for EMS and 

placed on 2 L nasal cannula with improvement.  Denies cough but has been short 

of breath since yesterday.  She had diarrhea yesterday.  Denies any significant 

abdominal pain. No ENT symptoms but has had a headache which is not new for her.

She denies sick contacts or known exposure to Covid 19. No significant appetite 

changes. No chest pain. 








- Related Data


Allergies/Adverse Reactions: 


                                        





tramadol Allergy (Intermediate, Verified 09/01/20 15:10)


   AMS


Penicillins Allergy (Mild, Verified 09/01/20 15:10)


   rash


Sulfa (Sulfonamide Antibiotics) Allergy (Mild, Verified 09/01/20 15:10)


   rash


aspirin Allergy (Verified 09/01/20 15:10)


   NO ASA











Past Medical History





- Social History


Smoking Status: Never Smoker


Family History: Reviewed & Not Pertinent, CAD, CVA, DM, Hyperlipidemia, 

Hypertension, Malignancy, Thyroid Disfunction, Other - Asthma





- Past Medical History


Cardiac Medical History: Reports: Hx Atrial Fibrillation, Hx Congestive Heart 

Failure, Hx Hypercholesterolemia, Hx Hypertension


   Denies: Hx Coronary Artery Disease, Hx DVT, Hx Heart Attack, Hx Pulmonary 

Embolism


Pulmonary Medical History: Reports: Hx Asthma, Hx Bronchitis


   Denies: Hx COPD, Hx Pneumonia


Neurological Medical History: Reports: Hx Migraine.  Denies: Hx Cerebrovascular 

Accident, Hx Seizures


Endocrine Medical History: Reports: Hx Diabetes Mellitus Type 2 - Poorly compli

ant with medications and diet, Hx Hypothyroidism.  Denies: Hx Diabetes Mellitus 

Type 1, Hx Hyperthyroidism


Renal/ Medical History: Denies: Hx Peritoneal Dialysis


GI Medical History: Reports: Hx Gastritis, Hx Gastroesophageal Reflux Disease.  

Denies: Hx Cirrhosis, Hx Crohn's Disease, Hx Hepatitis, Hx Ulcerative Colitis


Musculoskeletal Medical History: Reports Hx Arthritis, Denies Hx Gout, Reports 

Hx Musculoskeletal Trauma


Skin Medical History: Denies Hx Eczema, Denies Hx Psoriasis


Psychiatric Medical History: Reports: Hx Anxiety, Hx Depression, Hx 

Schizoaffective Disorder, Hx Schizophrenia - schizo affective


Infectious Medical History: Denies: Hx Hepatitis


Past Surgical History: Reports: Hx Cholecystectomy, Hx Tubal Ligation.  Denies: 

Hx Hysterectomy





- Immunizations


Immunizations up to date: Yes


Hx Diphtheria, Pertussis, Tetanus Vaccination: Yes - 2013





Review of Systems





- Review of Systems


Constitutional: Fever, Malaise


EENT: denies: Ear pain, Nose congestion, Nose discharge, Mouth pain


Cardiovascular: Lightheaded.  denies: Chest pain


Respiratory: Hurts to breathe, Short of breath, Wheezing.  denies: Cough


Gastrointestinal: Diarrhea.  denies: Abdominal pain, Vomiting, Poor appetite


Genitourinary: denies: Dysuria


Female Genitourinary: No symptoms reported


Musculoskeletal: denies: Neck pain


Skin: No symptoms reported.  denies: Lesions


Hematologic/Lymphatic: No symptoms reported


Neurological/Psychological: No symptoms reported





Physical Exam





- Vital signs


Vitals: 


                                        











Resp Pulse Ox


 


 13   95 


 


 09/01/20 14:07  09/01/20 14:07











Interpretation: Hypoxic, Tachypneic, Febrile





- General


General appearance: Appears well


In distress: Mild





- HEENT


Head: Normocephalic, Atraumatic


Eyes: Normal


Ears: Normal





- Respiratory


Respiratory status: Respiratory distress


Chest status: Nontender


Breath sounds: Decreased air movement, Other - Diminished lung sounds 

bilaterally





- Cardiovascular


Rhythm: Regular


Heart sounds: Normal auscultation





- Abdominal


Inspection: Normal, Obese.  No: Wounds


Distension: No distension


Tenderness: Nontender





- Extremities


General upper extremity: Normal inspection


General lower extremity: Normal inspection





- Neurological


Neuro grossly intact: Yes


Cognition: Normal


Orientation: AAOx4





- Psychological


Associated symptoms: Normal affect, Normal mood





- Skin


Skin Temperature: Warm


Skin Moisture: Dry





Course





- Re-evaluation


Re-evalutation: 





09/01/20 16:08


Nurse reports patient's O2 dropped to 85 when ambulating off oxygen. 2L nasal 

cannula placed again and patient improved.


09/01/20 17:31





Patient continues to feel short of breath, minimal relief with duonebs. Covid 

testing sent. She states she has pain with deep breathing and is compliant with 

her apixaban for atrial fibrillation. I will obtain a CTA of her chest to 

evaluate for PE as she is hypoxic and has pleuritic chest pain. Patient will 

need to be admitted for hypoxia and further management as she is requiring 2L 

nasal cannula and is normally on room air.





Given 500ml fluid by EMS and 1L in the Ed.





Patient's urinalysis has trace bacteria, moderate leukocyte esterase, and WBC. 

From record review, she has had a UTI in the past susceptible to Rocephin. I 

will treat as she did have fever today and later admitted to increased urinary 

frequency. Will await urine cultures. 





Patient will need to be admitted as she has acute respiratory failure requiring 

supplemental O2 with nasal cannula. Covid swab and blood work was obtained as 

this is a possible cause of her shortness of breath. CTA is pending to rule out 

PE and further assess for any pneumonia.





I discussed results with the patient and she is very agreeable to plan for 

admission. Patient will be signed out to my colleague at the end of my shift 

while pending CT scan.





09/01/20 18:57





09/01/20 19:01





09/01/20 19:21





09/01/20 19:23








- Vital Signs


Vital signs: 


                                        











Temp Pulse Resp BP Pulse Ox


 


 98.4 F   83   28 H  131/68 H  94 


 


 09/01/20 17:00  09/01/20 14:57  09/01/20 19:07  09/01/20 18:54  09/01/20 19:07














- Laboratory


Result Diagrams: 


                                 09/01/20 14:47





                                 09/01/20 14:47


Laboratory results interpreted by me: 


                                        











  09/01/20 09/01/20 09/01/20





  14:47 14:47 14:47


 


RDW  15.0 H  


 


PT   18.0 H 


 


D-Dimer   


 


Sodium    134.7 L


 


Est GFR ( Amer)    56 L


 


Est GFR (MDRD) Non-Af    46 L


 


Glucose    215 H


 


Calcium    8.3 L


 


C-Reactive Protein   


 


Total Protein    6.2 L


 


Urine Glucose (UA)   


 


Urine Ketones   


 


Urine Blood   


 


Leukocyte Esterase Rfl   














  09/01/20 09/01/20 09/01/20





  14:47 14:47 14:47


 


RDW   


 


PT   


 


D-Dimer    0.97 H


 


Sodium   


 


Est GFR ( Amer)   


 


Est GFR (MDRD) Non-Af   


 


Glucose   


 


Calcium   


 


C-Reactive Protein   224.1 H 


 


Total Protein   


 


Urine Glucose (UA)  >=500 H  


 


Urine Ketones  TRACE H  


 


Urine Blood  SMALL H  


 


Leukocyte Esterase Rfl  MODERATE H  














- Diagnostic Test


Radiology reviewed: Image reviewed, Reports reviewed





- EKG Interpretation by Me


EKG shows normal: Sinus rhythm


Rate: Normal


Rhythm: NSR


When compared to previous EKG there are: No significant change


Additional EKG results interpreted by me: 





09/01/20 16:11


Sinus rhythm at a rate of 81. . No acute ST changes. No significant 

change from previous EKG.





Discharge





- Discharge


Clinical Impression: 


 Hypoxia, Acute respiratory failure with hypoxia





Fever


Qualifiers:


 Fever type: unspecified Qualified Code(s): R50.9 - Fever, unspecified





Condition: Fair


Disposition: OTHER


Referrals: 


RIKI ALAS DO [Primary Care Provider] - Follow up as needed

## 2020-09-01 NOTE — EKG REPORT
SEVERITY:- BORDERLINE ECG -

SINUS RHYTHM

BORDERLINE T ABNORMALITIES, DIFFUSE LEADS

:

Confirmed by: Tawanda Ramirez MD 01-Sep-2020 18:35:23

## 2020-09-01 NOTE — RADIOLOGY REPORT (SQ)
EXAM DESCRIPTION:  CHEST SINGLE VIEW



IMAGES COMPLETED DATE/TIME:  9/1/2020 4:15 pm



REASON FOR STUDY:  cough, shortness of breath



COMPARISON:  5/12/2020.



NUMBER OF VIEWS:  One view.



TECHNIQUE:  Single frontal radiographic view of the chest acquired.



LIMITATIONS:  None.



FINDINGS:  LUNGS AND PLEURA: No opacities, masses or pneumothorax.  No pleural effusion.

MEDIASTINUM AND HILAR STRUCTURES: No masses.  Contour normal.

HEART AND VASCULAR STRUCTURES: Heart enlarged without failure.  Normal vasculature.

BONES: No acute findings.

HARDWARE: None in the chest.

OTHER: No other significant finding.



IMPRESSION:  HEART ENLARGED WITHOUT FAILURE.  NO OTHER SIGNIFICANT RADIOGRAPHIC FINDING IN THE CHEST.




TECHNICAL DOCUMENTATION:  JOB ID:  [id number]

 2011 [employer]- All Rights Reserved



Reading location - IP/workstation name: [email or web address]

## 2020-09-02 LAB
ALBUMIN SERPL-MCNC: 3.4 G/DL (ref 3.5–5)
ALP SERPL-CCNC: 115 U/L (ref 38–126)
ANION GAP SERPL CALC-SCNC: 12 MMOL/L (ref 5–19)
AST SERPL-CCNC: 25 U/L (ref 14–36)
BILIRUB DIRECT SERPL-MCNC: 0.5 MG/DL (ref 0–0.4)
BILIRUB SERPL-MCNC: 0.6 MG/DL (ref 0.2–1.3)
BUN SERPL-MCNC: 9 MG/DL (ref 7–20)
CALCIUM: 8 MG/DL (ref 8.4–10.2)
CHLORIDE SERPL-SCNC: 99 MMOL/L (ref 98–107)
CO2 SERPL-SCNC: 24 MMOL/L (ref 22–30)
ERYTHROCYTE [DISTWIDTH] IN BLOOD BY AUTOMATED COUNT: 15.1 % (ref 11.5–14)
GLUCOSE SERPL-MCNC: 161 MG/DL (ref 75–110)
HCT VFR BLD CALC: 36.8 % (ref 36–47)
HGB BLD-MCNC: 12.2 G/DL (ref 12–15.5)
MCH RBC QN AUTO: 31.4 PG (ref 27–33.4)
MCHC RBC AUTO-ENTMCNC: 33.3 G/DL (ref 32–36)
MCV RBC AUTO: 94 FL (ref 80–97)
PLATELET # BLD: 314 10^3/UL (ref 150–450)
POTASSIUM SERPL-SCNC: 4.6 MMOL/L (ref 3.6–5)
PROT SERPL-MCNC: 5.7 G/DL (ref 6.3–8.2)
RBC # BLD AUTO: 3.9 10^6/UL (ref 3.72–5.28)
WBC # BLD AUTO: 8.6 10^3/UL (ref 4–10.5)

## 2020-09-02 RX ADMIN — TIZANIDINE SCH MG: 4 TABLET ORAL at 21:23

## 2020-09-02 RX ADMIN — DEXAMETHASONE SODIUM PHOSPHATE SCH MG: 4 INJECTION, SOLUTION INTRAMUSCULAR; INTRAVENOUS at 21:25

## 2020-09-02 RX ADMIN — CEFTRIAXONE SCH MLS/HR: 1 INJECTION, SOLUTION INTRAVENOUS at 18:42

## 2020-09-02 RX ADMIN — INSULIN HUMAN SCH UNIT: 100 INJECTION, SOLUTION PARENTERAL at 11:25

## 2020-09-02 RX ADMIN — LORAZEPAM PRN MG: 2 INJECTION INTRAMUSCULAR; INTRAVENOUS at 16:27

## 2020-09-02 RX ADMIN — DOCUSATE SODIUM SCH MG: 100 CAPSULE, LIQUID FILLED ORAL at 18:42

## 2020-09-02 RX ADMIN — DOCUSATE SODIUM SCH MG: 100 CAPSULE, LIQUID FILLED ORAL at 10:09

## 2020-09-02 RX ADMIN — SODIUM CHLORIDE, SODIUM LACTATE, POTASSIUM CHLORIDE, AND CALCIUM CHLORIDE PRN MLS/HR: .6; .31; .03; .02 INJECTION, SOLUTION INTRAVENOUS at 08:35

## 2020-09-02 RX ADMIN — INSULIN HUMAN SCH: 100 INJECTION, SOLUTION PARENTERAL at 11:17

## 2020-09-02 RX ADMIN — FAMOTIDINE SCH MG: 20 TABLET, FILM COATED ORAL at 21:24

## 2020-09-02 RX ADMIN — METOPROLOL SUCCINATE SCH: 25 TABLET, EXTENDED RELEASE ORAL at 21:28

## 2020-09-02 RX ADMIN — Medication SCH: at 05:49

## 2020-09-02 RX ADMIN — Medication SCH: at 14:03

## 2020-09-02 RX ADMIN — ALPRAZOLAM SCH MG: 0.5 TABLET ORAL at 21:56

## 2020-09-02 RX ADMIN — Medication SCH: at 21:51

## 2020-09-02 RX ADMIN — SODIUM CHLORIDE, SODIUM LACTATE, POTASSIUM CHLORIDE, AND CALCIUM CHLORIDE PRN MLS/HR: .6; .31; .03; .02 INJECTION, SOLUTION INTRAVENOUS at 04:17

## 2020-09-02 RX ADMIN — PREGABALIN SCH: 75 CAPSULE ORAL at 23:14

## 2020-09-02 RX ADMIN — INSULIN HUMAN SCH UNIT: 100 INJECTION, SOLUTION PARENTERAL at 21:25

## 2020-09-02 RX ADMIN — AMITRIPTYLINE HYDROCHLORIDE SCH MG: 25 TABLET, FILM COATED ORAL at 21:24

## 2020-09-02 RX ADMIN — SODIUM CHLORIDE, SODIUM LACTATE, POTASSIUM CHLORIDE, AND CALCIUM CHLORIDE PRN MLS/HR: .6; .31; .03; .02 INJECTION, SOLUTION INTRAVENOUS at 20:04

## 2020-09-02 RX ADMIN — DEXAMETHASONE SODIUM PHOSPHATE SCH MG: 4 INJECTION, SOLUTION INTRAMUSCULAR; INTRAVENOUS at 14:11

## 2020-09-02 RX ADMIN — APIXABAN SCH MG: 5 TABLET, FILM COATED ORAL at 18:42

## 2020-09-02 RX ADMIN — APIXABAN SCH MG: 5 TABLET, FILM COATED ORAL at 10:09

## 2020-09-02 RX ADMIN — INSULIN HUMAN SCH UNIT: 100 INJECTION, SOLUTION PARENTERAL at 16:14

## 2020-09-02 RX ADMIN — DEXAMETHASONE SODIUM PHOSPHATE SCH MG: 4 INJECTION, SOLUTION INTRAMUSCULAR; INTRAVENOUS at 05:49

## 2020-09-02 RX ADMIN — AZITHROMYCIN MONOHYDRATE SCH MLS/HR: 500 INJECTION, POWDER, LYOPHILIZED, FOR SOLUTION INTRAVENOUS at 21:34

## 2020-09-02 RX ADMIN — SODIUM CHLORIDE, SODIUM LACTATE, POTASSIUM CHLORIDE, AND CALCIUM CHLORIDE PRN MLS/HR: .6; .31; .03; .02 INJECTION, SOLUTION INTRAVENOUS at 12:45

## 2020-09-02 RX ADMIN — INSULIN HUMAN SCH UNIT: 100 INJECTION, SOLUTION PARENTERAL at 08:16

## 2020-09-02 RX ADMIN — FAMOTIDINE SCH MG: 20 TABLET, FILM COATED ORAL at 10:09

## 2020-09-02 RX ADMIN — DIPHENHYDRAMINE HYDROCHLORIDE SCH MG: 50 CAPSULE ORAL at 21:24

## 2020-09-02 NOTE — PDOC PROGRESS REPORT
Subjective


Progress Note for:: 09/02/20


Subjective:: 





sobbing today.


Reason For Visit: 


COMMUNITY ACQUIRED BILATERAL MULTIFOCAL PNEUMONIA,








Physical Exam


Vital Signs: 


                                        











Temp Pulse Resp BP Pulse Ox


 


 98.2 F   83   24 H  128/90 H  93 


 


 09/02/20 17:02  09/01/20 14:57  09/02/20 17:01  09/02/20 17:01  09/02/20 17:01








                                 Intake & Output











 09/01/20 09/02/20 09/03/20





 06:59 06:59 06:59


 


Intake Total  2770 3200


 


Output Total   1600


 


Balance  2770 1600


 


Weight  108.862 kg 110.7 kg











Respiratory exam: PRESENT: rales - right base


Cardiovascular exam: PRESENT: RRR, +S1, +S2





Results


Laboratory Results: 


                                        





                                 09/02/20 06:33 





                                 09/02/20 06:33 





                                        











  09/01/20 09/01/20 09/02/20





  14:47 21:36 06:33


 


WBC    8.6


 


RBC    3.90


 


Hgb    12.2


 


Hct    36.8


 


MCV    94


 


MCH    31.4


 


MCHC    33.3


 


RDW    15.1 H


 


Plt Count    314


 


Sodium   


 


Potassium   


 


Chloride   


 


Carbon Dioxide   


 


Anion Gap   


 


BUN   


 


Creatinine   


 


Est GFR (African Amer)   


 


Glucose   


 


Lactic Acid   0.9 


 


Calcium   


 


Ferritin  93.30  


 


Total Bilirubin   


 


AST   


 


Alkaline Phosphatase   


 


Total Protein   


 


Albumin   














  09/02/20





  06:33


 


WBC 


 


RBC 


 


Hgb 


 


Hct 


 


MCV 


 


MCH 


 


MCHC 


 


RDW 


 


Plt Count 


 


Sodium  135.4 L


 


Potassium  4.6


 


Chloride  99


 


Carbon Dioxide  24


 


Anion Gap  12


 


BUN  9


 


Creatinine  0.88


 


Est GFR (African Amer)  > 60


 


Glucose  161 H


 


Lactic Acid 


 


Calcium  8.0 L


 


Ferritin 


 


Total Bilirubin  0.6


 


AST  25


 


Alkaline Phosphatase  115


 


Total Protein  5.7 L


 


Albumin  3.4 L








                                        





09/01/20 14:47   Clean Catch Midstream   Urine Culture - Final


                            Group G Beta Streptococcus





                                        











  09/01/20





  14:47


 


Troponin I  < 0.012











Impressions: 


                                        





Chest X-Ray  09/01/20 15:25


IMPRESSION:  HEART ENLARGED WITHOUT FAILURE.  NO OTHER SIGNIFICANT RADIOGRAPHIC 

FINDING IN THE CHEST.


 








Chest/Abdomen CTA  09/01/20 16:59


IMPRESSION:


 


1.  No central pulmonary embolism.


2.   Commonly reported imaging features of (COVID-19 or viral)


pneumonia are present. Other processes such as influenza


pneumonia and organizing pneumonia, as can be seen with drug


toxicity and connective tissue disease, can cause a similar


imaging pattern. [PneTyp] 


 














Assessment and Plan





- Diagnosis


(1) Acute respiratory failure with hypoxia


Is this a current diagnosis for this admission?: Yes   


Plan: 


Still requiring oxygen supplementation.  Goal is to maintain saturation greater 

than 90%.








(2) Community acquired pneumonia, bilateral


Is this a current diagnosis for this admission?: Yes   


Plan: 


Based on CT scan.  She is afebrile today.  White count has always been normal.  

Requiring supplemental oxygen.  Currently on antibiotic therapy.  Await COVID 

serology.








(3) Suspected COVID-19 virus infection


Is this a current diagnosis for this admission?: Yes   


Plan: 


Based on presentation, COVID suspicion is high.  Continue current treatment 

regimen and await results of COVID testing








(4) UTI (urinary tract infection)


Qualifiers: 


   Urinary tract infection type: site unspecified   Hematuria presence: without 

hematuria   Qualified Code(s): N39.0 - Urinary tract infection, site not 

specified   


Is this a current diagnosis for this admission?: Yes   


Plan: 


Beta-hemolytic strep identified.  The patient is allergic to penicillin but the 

ceftriaxone should be effective.








(5) Hyponatremia


Is this a current diagnosis for this admission?: Yes   


Plan: 


Very mild.  Could be consistent with her chronic diastolic heart failure.  Will 

monitor with laboratory studies








(6) Chronic diastolic congestive heart failure


Is this a current diagnosis for this admission?: Yes   


Plan: 


No evidence of acute failure.  Continue home medication regimen and monitor 

intake and output








(7) Fever


Qualifiers: 


   Fever type: unspecified   Qualified Code(s): R50.9 - Fever, unspecified   


Is this a current diagnosis for this admission?: Yes   


Plan: 


Afebrile today








(8) Paroxysmal atrial fibrillation


Is this a current diagnosis for this admission?: Yes   


Plan: 


Good rate control.  Continue anticoagulation and beta-blocker








(9) Hyperglycemia due to diabetes mellitus


Is this a current diagnosis for this admission?: Yes   





(10) Hypertension


Qualifiers: 


   Hypertension type: essential hypertension   Qualified Code(s): I10 - 

Essential (primary) hypertension   


Is this a current diagnosis for this admission?: Yes   


Plan: 


Mild elevation of blood pressure.  Continue current regimen.  We will adjust 

medications based on vital signs








(11) Morbid obesity with BMI of 40.0-44.9, adult


Is this a current diagnosis for this admission?: Yes   


Plan: 


BMI is 43.  It would help significantly with weight loss.  Initial efforts 

should be with strict diet management and then incorporate exercise








(12) Schizoaffective disorder


Qualifiers: 


   Schizoaffective disorder type: unspecified   Qualified Code(s): F25.9 - 

Schizoaffective disorder, unspecified   


Is this a current diagnosis for this admission?: Yes   


Plan: 


Continue antipsychotic and antidepressant medications








- Plan Summary


Summary: 


Patient will be admitted to the medical floor where she will receive routine 

supportive and symptomatic cares.  She will be treated with IV antibiotics 

utilizing Rocephin and azithromycin.  She will receive supplemental oxygen via 

nasal cannula in order to maintain an adequate oxygen saturation.  She will re

ceive her usual inhaler medications for her chronic COPD/asthma.  She will 

receive IV dexamethasone 10 mg initially followed by 6 mg/day.  She will receive

 IV fluids with lactated Ringer's solution at 250 mL/h initially.  She will 

receive morphine sulfate 2 to 4 mg IV every 2 hours as needed for pain.  She 

will receive Ativan 1 mg IV every 4 hours as needed for anxiety or restlessness.

  Before meals and at bedtime Accu-Cheks will be performed with sliding scale 

insulin for hyperglycemia and a hypoglycemic protocol in place.  Patient's usual

 home medications will be resumed, as appropriate, as soon as her medication 

list has been verified and reconciled.  She will receive a diabetic, cardiac and

 prerenal restricted diet.





- Time


Time Spent with patient: 15-24 minutes


Medications reviewed and adjusted accordingly: Yes


Anticipated Discharge Disposition: Home with Home Health


Anticipated Discharge Timeframe: within 72 hours

## 2020-09-02 NOTE — PDOC H&P
History of Present Illness


Admission Date/PCP: 


09/01/20 21:43


RIKI ALAS DO


Patient complains of: Fever


History of Present Illness: 


EVERARDO GEORGE is a 55 year old female who presented to the emergency room with 

an acute fever.  She admits feeling lightheaded at her [facility] physi

[facility] office causing the office staff to take her vital signs.  She was found 

to have a temperature of 102 F and EMS was summoned to bring her to the 

hospital.  She admits accompanying mild dyspnea and general malaise gradually 

worsening over the last 24 hours.  She admits an associated single episode of 

diarrhea yesterday.  She denies other associated or accompanying signs and 

symptoms.  She admits prior similar episodes.  She has not identified any 

aggravating or ameliorating factors for her fever.  In the emergency room she 

was found to be hypoxic requiring supplemental oxygen to maintain an adequate 

oxygen saturation and a CTA of the chest demonstrated multifocal bilateral 

pneumonia concerning for COVID-19.  Additionally she was noted to have a 

positive CRP and a positive d-dimer.  A COVID-19 nasal swab screening was 

obtained and the patient was admitted under investigation for further evaluation

and treatment.





Past Medical History


Cardiac Medical History: Reports: Atrial Fibrillation, Congestive Heart Failure,

Hyperlipidema, Hypertension


   Denies: Coronary Artery Disease, DVT, Myocardial Infarction, Pulmonary 

Embolism


Pulmonary Medical History: Reports: Asthma, Bronchitis


   Denies: Chronic Obstructive Pulmonary Disease (COPD), Pneumonia


EENT Medical History: 


   Denies: Cataracts, Ears - Hearing aids


Neurological Medical History: Reports: Migraine


   Denies: Hemorrhagic CVA, Ischemic CVA, Seizures


Endocrine Medical History: Reports: Diabetes Mellitus Type 2 - Poorly compliant 

with medications and diet, Hypothyroidism


   Denies: Diabetes Mellitus Type 1, Hyperthyroidism


Renal/ Medical History: 


   Denies: Chronic Kidney Disease, Nephrolithiasis


Malignancy Medical History: Reports: None


GI Medical History: Reports: Gastroesophageal Reflux Disease


   Denies: Cirrhosis, Crohn's Disease, Hepatitis, Peptic Ulcer Disease, 

Ulcerative Colitis


Musculoskeltal Medical History: Reports: Arthritis


   Denies: Gout


Skin Medical History: 


   Denies: Eczema, Psoriasis


Psychiatric Medical History: Reports: Depression, Schizoaffective Disorder


   Denies: Alcohol Dependency, Substance Abuse, Tobacco Dependency


Traumatic Medical History: Reports: None


Hematology: 


   Denies: Anemia, Bleeding Tendencies


Infectious Medical History: Reports: None





Past Surgical History


Past Surgical History: Reports: Cholecystectomy, Tubal Ligation





Social History


Information Source: Patient


Lives with: Alone


Smoking Status: Former Smoker


Electronic Cigarette use?: No


Frequency of Alcohol Use: None


Hx Recreational Drug Use: No


Drugs: None


Hx Prescription Drug Abuse: No





- Advance Directive


Resuscitation Status: Full Code


Surrogate healthcare decision maker:: 


Patient declines to provide a surrogate decision-maker at this time.





Family History


Family History: CAD, CVA, DM, Hyperlipidemia, Hypertension, Malignancy, Thyroid 

Disfunction, Other - Asthma


Parental Family History Reviewed: Yes


Children Family History Reviewed: No


Sibling(s) Family History Reviewed.: Yes





Medication/Allergy


Home Medications: 








Apixaban [Eliquis 5 mg Tablet] 5 mg PO Q12 12/20/17 


Zolpidem Tartrate [Ambien] 10 mg PO QHS 12/20/17 


Brexpiprazole [Rexulti] 2 mg PO QHS 11/16/18 


Solifenacin Succinate [Vesicare] 10 mg PO DAILY 11/16/18 


Oxycodone HCl/Acetaminophen [Percocet 5-325 mg Tablet] 1 tab PO Q8HP PRN 

03/22/20 


Prazosin HCl [Minipress] 2 mg PO QHS 03/22/20 


Tizanidine HCl 6 mg PO Q8 03/22/20 


Metoprolol Succinate [Toprol Xl 25 mg Tab.sr] 50 mg PO Q12 30 Days 03/31/20 


Diphenhydramine HCl [Benadryl] 50 mg PO QHS 06/21/20 


Furosemide [Lasix 40 mg Tablet] 20 mg PO DAILY 06/21/20 


Lisinopril [Prinivil 5 mg Tablet] 5 mg PO DAILY 06/21/20 


Pregabalin [Lyrica] 225 mg PO Q12 06/21/20 


Acetaminophen [Tylenol 325 mg Tablet] 650 mg PO Q4HP PRN  tablet 06/24/20 


Linezolid [Zyvox 600 mg Tablet] 600 mg PO Q12 #10 tablet 06/24/20 


Metronidazole [Flagyl 500 mg Tablet] 500 mg PO Q6H #40 tablet 06/24/20 


Clindamycin HCl [Cleocin 150 mg Capsule] 450 mg PO TID 10 Days #90 capsule 

07/26/20 


Hydrocodone/Acetaminophen [Norco 5-325 mg Tablet] 1 tab PO Q6HP PRN #12 tablet 

07/26/20 








Allergies/Adverse Reactions: 


                                        





tramadol Allergy (Intermediate, Verified 09/01/20 15:10)


   AMS


Penicillins Allergy (Mild, Verified 09/01/20 15:10)


   rash


Sulfa (Sulfonamide Antibiotics) Allergy (Mild, Verified 09/01/20 15:10)


   rash


aspirin Allergy (Verified 09/01/20 15:10)


   NO ASA











Review of Systems


Constitutional: PRESENT: as per HPI, fever(s), other - Malaise.  ABSENT: chills


Eyes: ABSENT: visual disturbances, other - Eye pain


Ears: ABSENT: hearing changes, other - Ear pain


Nose, Mouth, and Throat: PRESENT: headache(s) - Frequent.  ABSENT: sore throat


Cardiovascular: PRESENT: as per HPI, other - Lightheadedness.  ABSENT: chest 

pain, palpitations


Respiratory: PRESENT: as per HPI, dyspnea.  ABSENT: cough


Gastrointestinal: PRESENT: as per HPI, diarrhea.  ABSENT: abdominal pain, const

ipation, nausea, vomiting


Genitourinary: ABSENT: dysuria, hematuria


Musculoskeletal: ABSENT: back pain, joint swelling


Integumentary: ABSENT: pruritus, rash


Neurological: ABSENT: confusion, convulsions, focal weakness, memory loss, 

syncope


Psychiatric: ABSENT: anxiety, depression


Endocrine: ABSENT: cold intolerance, heat intolerance


Hematologic/Lymphatic: ABSENT: easy bleeding, easy bruising


Allergic/Immunologic: ABSENT: seasonal rhinorrhea





Physical Exam


Vital Signs: 


                                        











Temp Pulse Resp BP Pulse Ox


 


 98.4 F   83   25 H  127/79 H  100 


 


 09/01/20 21:15  09/01/20 14:57  09/01/20 21:03  09/01/20 21:03  09/01/20 21:03








                                 Intake & Output











 08/30/20 08/31/20 09/01/20





 23:59 23:59 23:59


 


Intake Total   1520


 


Balance   1520


 


Weight   108.862 kg











General appearance: PRESENT: no acute distress, cooperative, morbidly obese


Head exam: PRESENT: atraumatic, normocephalic


Eye exam: PRESENT: conjunctiva pink.  ABSENT: conjunctival injection, scleral 

icterus


Ear exam: PRESENT: normal external ear exam.  ABSENT: bleeding, drainage


Mouth exam: PRESENT: dry mucosa, neck supple


Neck exam: ABSENT: thyromegaly, tracheal deviation


Respiratory exam: PRESENT: clear to auscultation pallavi, symmetrical, tachypnea.  

ABSENT: rales, rhonchi


Cardiovascular exam: PRESENT: RRR.  ABSENT: clicks, gallop, rubs


Pulses: PRESENT: normal radial pulses, normal dorsalis pedis pul


Vascular exam: PRESENT: normal capillary refill.  ABSENT: pallor


GI/Abdominal exam: PRESENT: normal bowel sounds, soft


Rectal exam: PRESENT: deferred


Extremities exam: ABSENT: joint swelling, pedal edema


Musculoskeletal exam: ABSENT: deformity, dislocation


Neurological exam: PRESENT: alert, oriented to person, oriented to place, 

oriented to time, oriented to situation, CN II-XII grossly intact.  ABSENT: 

motor sensory deficit


Psychiatric exam: PRESENT: appropriate affect, normal mood


Skin exam: PRESENT: dry, intact, warm.  ABSENT: jaundice, rash, urticaria





Results


Laboratory Results: 


                                        





                                 09/01/20 14:47 





                                 09/01/20 14:47 





                                        











  09/01/20 09/01/20 09/01/20





  14:47 14:47 14:47


 


WBC  8.2  


 


RBC  4.21  


 


Hgb  12.7  


 


Hct  39.3  


 


MCV  93  


 


MCH  30.2  


 


MCHC  32.4  


 


RDW  15.0 H  


 


Plt Count  327  


 


Seg Neutrophils %  75.6  


 


Sodium   134.7 L 


 


Potassium   4.0 


 


Chloride   100 


 


Carbon Dioxide   26 


 


Anion Gap   9 


 


BUN   10 


 


Creatinine   1.21 


 


Est GFR ( Amer)   56 L 


 


Glucose   215 H 


 


Lactic Acid   


 


Calcium   8.3 L 


 


Ferritin   


 


Total Bilirubin   0.7 


 


AST   25 


 


Alkaline Phosphatase   122 


 


C-Reactive Protein   


 


Total Protein   6.2 L 


 


Albumin   3.5 


 


Lipase   39.0 


 


Serum HCG, Qual    NEGATIVE


 


Urine Color   


 


Urine Appearance   


 


Urine pH   


 


Ur Specific Gravity   


 


Urine Protein   


 


Urine Glucose (UA)   


 


Urine Ketones   


 


Urine Blood   


 


Urine RBC (Auto)   














  09/01/20 09/01/20 09/01/20





  14:47 14:47 15:43


 


WBC   


 


RBC   


 


Hgb   


 


Hct   


 


MCV   


 


MCH   


 


MCHC   


 


RDW   


 


Plt Count   


 


Seg Neutrophils %   


 


Sodium   


 


Potassium   


 


Chloride   


 


Carbon Dioxide   


 


Anion Gap   


 


BUN   


 


Creatinine   


 


Est GFR (African Amer)   


 


Glucose   


 


Lactic Acid    1.0


 


Calcium   


 


Ferritin   93.30 


 


Total Bilirubin   


 


AST   


 


Alkaline Phosphatase   


 


C-Reactive Protein   224.1 H 


 


Total Protein   


 


Albumin   


 


Lipase   


 


Serum HCG, Qual   


 


Urine Color  YELLOW  


 


Urine Appearance  SLIGHTLY-CLOUDY  


 


Urine pH  5.0  


 


Ur Specific Gravity  1.009  


 


Urine Protein  NEGATIVE  


 


Urine Glucose (UA)  >=500 H  


 


Urine Ketones  TRACE H  


 


Urine Blood  SMALL H  


 


Urine RBC (Auto)  1  














  09/01/20





  17:50


 


WBC 


 


RBC 


 


Hgb 


 


Hct 


 


MCV 


 


MCH 


 


MCHC 


 


RDW 


 


Plt Count 


 


Seg Neutrophils % 


 


Sodium 


 


Potassium 


 


Chloride 


 


Carbon Dioxide 


 


Anion Gap 


 


BUN 


 


Creatinine 


 


Est GFR (African Amer) 


 


Glucose 


 


Lactic Acid  0.9


 


Calcium 


 


Ferritin 


 


Total Bilirubin 


 


AST 


 


Alkaline Phosphatase 


 


C-Reactive Protein 


 


Total Protein 


 


Albumin 


 


Lipase 


 


Serum HCG, Qual 


 


Urine Color 


 


Urine Appearance 


 


Urine pH 


 


Ur Specific Gravity 


 


Urine Protein 


 


Urine Glucose (UA) 


 


Urine Ketones 


 


Urine Blood 


 


Urine RBC (Auto) 








                                        











  09/01/20





  14:47


 


Troponin I  < 0.012











Impressions: 


                                        





Chest X-Ray  09/01/20 15:25


IMPRESSION:  HEART ENLARGED WITHOUT FAILURE.  NO OTHER SIGNIFICANT RADIOGRAPHIC 

FINDING IN THE CHEST.


 








Chest/Abdomen CTA  09/01/20 16:59


IMPRESSION:


 


1.  No central pulmonary embolism.


2.   Commonly reported imaging features of (COVID-19 or viral)


pneumonia are present. Other processes such as influenza


pneumonia and organizing pneumonia, as can be seen with drug


toxicity and connective tissue disease, can cause a similar


imaging pattern. [PneTyp] 


 














Assessment and Plan





- Diagnosis


(1) Community acquired pneumonia, bilateral


Is this a current diagnosis for this admission?: Yes   





(2) Suspected COVID-19 virus infection


Is this a current diagnosis for this admission?: Yes   





(3) Acute respiratory failure with hypoxia


Is this a current diagnosis for this admission?: Yes   





(4) Acute kidney injury (nontraumatic)


Is this a current diagnosis for this admission?: Yes   





(5) Hyponatremia


Is this a current diagnosis for this admission?: Yes   





(6) Fever


Qualifiers: 


   Fever type: unspecified   Qualified Code(s): R50.9 - Fever, unspecified   


Is this a current diagnosis for this admission?: Yes   





(7) Chronic diastolic congestive heart failure


Is this a current diagnosis for this admission?: Yes   





(8) Hypothyroidism


Qualifiers: 


   Hypothyroidism type: unspecified   Qualified Code(s): E03.9 - Hypothyroidism,

 unspecified   


Is this a current diagnosis for this admission?: Yes   





(9) Paroxysmal atrial fibrillation


Is this a current diagnosis for this admission?: Yes   





(10) Hypertension


Qualifiers: 


   Hypertension type: essential hypertension   Qualified Code(s): I10 - 

Essential (primary) hypertension   


Is this a current diagnosis for this admission?: Yes   





(11) Diabetes mellitus type 2 in obese


Is this a current diagnosis for this admission?: Yes   





(12) Morbid obesity with BMI of 40.0-44.9, adult


Is this a current diagnosis for this admission?: Yes   





- Plan Summary


Summary: 


Patient will be admitted to the medical floor where she will receive routine 

supportive and symptomatic cares.  She will be treated with IV antibiotics 

utilizing Rocephin and azithromycin.  She will receive supplemental oxygen via 

nasal cannula in order to maintain an adequate oxygen saturation.  She will 

receive her usual inhaler medications for her chronic COPD/asthma.  She will 

receive IV dexamethasone 10 mg initially followed by 6 mg/day.  She will receive

 IV fluids with lactated Ringer's solution at 250 mL/h initially.  She will 

receive morphine sulfate 2 to 4 mg IV every 2 hours as needed for pain.  She 

will receive Ativan 1 mg IV every 4 hours as needed for anxiety or restlessness.

  Before meals and at bedtime Accu-Cheks will be performed with sliding scale 

insulin for hyperglycemia and a hypoglycemic protocol in place.  Patient's usual

 home medications will be resumed, as appropriate, as soon as her medication 

list has been verified and reconciled.  She will receive a diabetic, cardiac and

 prerenal restricted diet.





- Time


Time Spent with patient: Less than 15 minutes


Medications reviewed and adjusted accordingly: Yes


Anticipated Discharge Disposition: Home with Home Health


Anticipated Discharge Timeframe: Undetermined





- Inpatient Certification


Based on my medical assessment, after consideration of the patient's 

comorbidities, presenting symptoms, or acuity I expect that the services needed 

warrant INPATIENT care.: Yes


I certify that my determination is in accordance with my understanding of 

Medicare's requirements for reasonable and necessary INPATIENT services [42 CFR 

412.3e].: Yes


Medical Necessity: Significant Comorbidiites Make Outpatient Treatment Too 

Risky, Need Close Monitoring Due to Risk of Patient Decompensation, Need For IV 

Fluids, Need for IV Antibiotics, Risk of Complication if Not Cared For in 

Hospital, Risk of Diagnosis Which Will Require Inpatient Eval/Care/Monitoring

## 2020-09-03 RX ADMIN — FUROSEMIDE SCH MG: 10 INJECTION, SOLUTION INTRAMUSCULAR; INTRAVENOUS at 23:02

## 2020-09-03 RX ADMIN — METOPROLOL SUCCINATE SCH MG: 25 TABLET, EXTENDED RELEASE ORAL at 11:11

## 2020-09-03 RX ADMIN — DOCUSATE SODIUM SCH: 100 CAPSULE, LIQUID FILLED ORAL at 10:39

## 2020-09-03 RX ADMIN — SODIUM CHLORIDE, SODIUM LACTATE, POTASSIUM CHLORIDE, AND CALCIUM CHLORIDE PRN MLS/HR: .6; .31; .03; .02 INJECTION, SOLUTION INTRAVENOUS at 08:22

## 2020-09-03 RX ADMIN — FAMOTIDINE SCH MG: 20 TABLET, FILM COATED ORAL at 11:11

## 2020-09-03 RX ADMIN — INSULIN HUMAN SCH UNIT: 100 INJECTION, SOLUTION PARENTERAL at 13:13

## 2020-09-03 RX ADMIN — METOPROLOL SUCCINATE SCH MG: 25 TABLET, EXTENDED RELEASE ORAL at 22:59

## 2020-09-03 RX ADMIN — Medication SCH: at 13:22

## 2020-09-03 RX ADMIN — LISINOPRIL SCH MG: 5 TABLET ORAL at 11:11

## 2020-09-03 RX ADMIN — ACETAMINOPHEN PRN MG: 325 TABLET ORAL at 05:58

## 2020-09-03 RX ADMIN — INSULIN HUMAN SCH UNIT: 100 INJECTION, SOLUTION PARENTERAL at 08:22

## 2020-09-03 RX ADMIN — Medication SCH: at 05:54

## 2020-09-03 RX ADMIN — OXYCODONE HYDROCHLORIDE AND ACETAMINOPHEN SCH MG: 500 TABLET ORAL at 18:09

## 2020-09-03 RX ADMIN — DEXAMETHASONE SODIUM PHOSPHATE SCH MG: 4 INJECTION, SOLUTION INTRAMUSCULAR; INTRAVENOUS at 05:20

## 2020-09-03 RX ADMIN — INSULIN HUMAN SCH UNIT: 100 INJECTION, SOLUTION PARENTERAL at 23:00

## 2020-09-03 RX ADMIN — TIZANIDINE SCH MG: 4 TABLET ORAL at 05:20

## 2020-09-03 RX ADMIN — Medication SCH: at 23:02

## 2020-09-03 RX ADMIN — TIZANIDINE SCH MG: 4 TABLET ORAL at 13:11

## 2020-09-03 RX ADMIN — APIXABAN SCH MG: 5 TABLET, FILM COATED ORAL at 11:11

## 2020-09-03 RX ADMIN — ENOXAPARIN SODIUM SCH MG: 120 INJECTION SUBCUTANEOUS at 23:00

## 2020-09-03 RX ADMIN — DEXAMETHASONE SODIUM PHOSPHATE SCH MG: 4 INJECTION, SOLUTION INTRAMUSCULAR; INTRAVENOUS at 23:00

## 2020-09-03 RX ADMIN — ACETAMINOPHEN PRN MG: 325 TABLET ORAL at 20:12

## 2020-09-03 RX ADMIN — AMITRIPTYLINE HYDROCHLORIDE SCH MG: 25 TABLET, FILM COATED ORAL at 23:01

## 2020-09-03 RX ADMIN — DOCUSATE SODIUM SCH MG: 100 CAPSULE, LIQUID FILLED ORAL at 18:10

## 2020-09-03 RX ADMIN — INSULIN HUMAN SCH UNIT: 100 INJECTION, SOLUTION PARENTERAL at 15:34

## 2020-09-03 RX ADMIN — PREGABALIN SCH MG: 75 CAPSULE ORAL at 11:11

## 2020-09-03 RX ADMIN — DIPHENHYDRAMINE HYDROCHLORIDE SCH MG: 50 CAPSULE ORAL at 22:59

## 2020-09-03 RX ADMIN — ALPRAZOLAM SCH MG: 0.5 TABLET ORAL at 05:29

## 2020-09-03 RX ADMIN — ALPRAZOLAM SCH MG: 0.5 TABLET ORAL at 13:12

## 2020-09-03 RX ADMIN — AZITHROMYCIN MONOHYDRATE SCH MLS/HR: 500 INJECTION, POWDER, LYOPHILIZED, FOR SOLUTION INTRAVENOUS at 23:01

## 2020-09-03 RX ADMIN — FAMOTIDINE SCH MG: 20 TABLET, FILM COATED ORAL at 22:59

## 2020-09-03 RX ADMIN — NYSTATIN CREAM SCH: 100000 CREAM TOPICAL at 20:13

## 2020-09-03 RX ADMIN — PREGABALIN SCH MG: 75 CAPSULE ORAL at 23:00

## 2020-09-03 RX ADMIN — DEXAMETHASONE SODIUM PHOSPHATE SCH MG: 4 INJECTION, SOLUTION INTRAMUSCULAR; INTRAVENOUS at 13:18

## 2020-09-03 RX ADMIN — CEFTRIAXONE SCH MLS/HR: 1 INJECTION, SOLUTION INTRAVENOUS at 18:07

## 2020-09-03 NOTE — PDOC PROGRESS REPORT
Subjective


Progress Note for:: 09/03/20


Subjective:: 


Patient appears much more ill than yesterday.  Doing poorly after transfer from 

the emergency department.  Oxygen saturations dropped into the 70% range.  She 

is tachypneic.  She is extremely anxious.


Reason For Visit: 


COMMUNITY ACQUIRED BILATERAL MULTIFOCAL PNEUMONIA,








Physical Exam


Vital Signs: 


                                        











Temp Pulse Resp BP Pulse Ox


 


 98.2 F   85   25 H  148/72 H  93 


 


 09/03/20 10:35  09/03/20 10:35  09/03/20 10:35  09/03/20 10:35  09/03/20 10:35








                                 Intake & Output











 09/02/20 09/03/20 09/04/20





 06:59 06:59 06:59


 


Intake Total 2770 4277 1473


 


Output Total  2350 


 


Balance 2770 1927 1473


 


Weight 108.862 kg 110.7 kg 











General appearance: PRESENT: morbidly obese, severe distress


Head exam: PRESENT: atraumatic, normocephalic


Respiratory exam: PRESENT: symmetrical, tachypnea, wheezes - Audible at the 

bedside


Cardiovascular exam: PRESENT: +S1, +S2, tachycardia


GI/Abdominal exam: PRESENT: diminished bowel sounds, soft.  ABSENT: tenderness


Rectal exam: PRESENT: deferred


Gentrourinary exam: ABSENT: indwelling catheter


Musculoskeletal exam: ABSENT: deformity, dislocation


Neurological exam: PRESENT: altered - Somewhat confused due to the acute illness

and hypoxia, awake, other - Difficult to completely assess at this time


Psychiatric exam: PRESENT: agitated - Trying to set up as it is very hard to 

breathe





Results


Laboratory Results: 


                                        





                                 09/02/20 06:33 





                                 09/02/20 06:33 





                                        











  09/01/20





  14:47


 


Troponin I  < 0.012











Impressions: 


                                        





Chest X-Ray  09/01/20 15:25


IMPRESSION:  HEART ENLARGED WITHOUT FAILURE.  NO OTHER SIGNIFICANT RADIOGRAPHIC 

FINDING IN THE CHEST.


 








Chest/Abdomen CTA  09/01/20 16:59


IMPRESSION:


 


1.  No central pulmonary embolism.


2.   Commonly reported imaging features of (COVID-19 or viral)


pneumonia are present. Other processes such as influenza


pneumonia and organizing pneumonia, as can be seen with drug


toxicity and connective tissue disease, can cause a similar


imaging pattern. [PneTyp] 


 














Assessment and Plan





- Diagnosis


(1) Pneumonia due to SARS-associated coronavirus


Is this a current diagnosis for this admission?: Yes   


Plan: 


The patient has declined from yesterday.  She is extremely wheezy and short of 

breath.  She was positive for coronavirus.  She is decompensating.  We will 

increase oxygen supplement and monitor closely.  Remdesivir added today.








(2) Acute respiratory failure with hypoxia


Is this a current diagnosis for this admission?: Yes   


Plan: 


Secondary to coronavirus pneumonia.  Continue oxygen supplementation.








(3) Community acquired pneumonia, bilateral


Is this a current diagnosis for this admission?: Yes   


Plan: 


Patient tested positive for corona virus.  Continue current medications








(4) UTI (urinary tract infection)


Qualifiers: 


   Urinary tract infection type: site unspecified   Hematuria presence: without 

hematuria   Qualified Code(s): N39.0 - Urinary tract infection, site not 

specified   


Is this a current diagnosis for this admission?: Yes   


Plan: 


Group G strep.  Covered by current antibiotic regimen.








(5) Hyponatremia


Is this a current diagnosis for this admission?: Yes   


Plan: 


Serum sodium is 135.  Recheck tomorrow.








(6) Chronic diastolic congestive heart failure


Is this a current diagnosis for this admission?: Yes   


Plan: 


The patient did receive a form of IV fluid.  I have currently put that on hold 

and will start Lasix 20 mg daily to avoid volume overload








(7) Fever


Qualifiers: 


   Fever type: unspecified   Qualified Code(s): R50.9 - Fever, unspecified   


Is this a current diagnosis for this admission?: Yes   


Plan: 


Has been afebrile for 48 hours but this morning spiked a temperature to 102.3.  

Acetaminophen as needed.








(8) Paroxysmal atrial fibrillation


Is this a current diagnosis for this admission?: Yes   


Plan: 


Continue metoprolol.  Lovenox therapy initiated and the apixaban will be held








(9) Hyperglycemia due to diabetes mellitus


Is this a current diagnosis for this admission?: Yes   


Plan: 


We will utilize sliding scale coverage with Accu-Cheks before meals and at 

bedtime.  She is on multiple medications previously.  Will monitor closely and 

adjust medications based on her Accu-Cheks








(10) Hypertension


Qualifiers: 


   Hypertension type: essential hypertension   Qualified Code(s): I10 - 

Essential (primary) hypertension   


Is this a current diagnosis for this admission?: Yes   


Plan: 


On multiple medications.  May need to adjust the treatment plan based on her 

ability to swallow.  IV medications will be available as well.








(11) Morbid obesity with BMI of 40.0-44.9, adult


Is this a current diagnosis for this admission?: Yes   


Plan: 


Unfortunately a very significant comorbidity with her current respiratory state








(12) Schizoaffective disorder


Qualifiers: 


   Schizoaffective disorder type: unspecified   Qualified Code(s): F25.9 - 

Schizoaffective disorder, unspecified   


Is this a current diagnosis for this admission?: Yes   


Plan: 


We will try to continue her normal medication regimen.  Her daughter did drop 

off her medications and this will benefit the patient for anything not on the 

formulary.








- Plan Summary


Summary: 


Patient will be admitted to the medical floor where she will receive routine 

supportive and symptomatic cares.  She will be treated with IV antibiotics 

utilizing Rocephin and azithromycin.  She will receive supplemental oxygen via 

nasal cannula in order to maintain an adequate oxygen saturation.  She will 

receive her usual inhaler medications for her chronic COPD/asthma.  She will 

receive IV dexamethasone 10 mg initially followed by 6 mg/day.  She will receive

 IV fluids with lactated Ringer's solution at 250 mL/h initially.  She will 

receive morphine sulfate 2 to 4 mg IV every 2 hours as needed for pain.  She 

will receive Ativan 1 mg IV every 4 hours as needed for anxiety or restlessness.

  Before meals and at bedtime Accu-Cheks will be performed with sliding scale 

insulin for hyperglycemia and a hypoglycemic protocol in place.  Patient's usual

 home medications will be resumed, as appropriate, as soon as her medication 

list has been verified and reconciled.  She will receive a diabetic, cardiac and

 prerenal restricted diet.





- Time


Time Spent with patient: 25-34 minutes


Medications reviewed and adjusted accordingly: Yes


Anticipated Discharge Disposition: Unknown


Anticipated Discharge Timeframe: Unknown

## 2020-09-04 LAB
ALBUMIN SERPL-MCNC: 3.3 G/DL (ref 3.5–5)
ALP SERPL-CCNC: 117 U/L (ref 38–126)
ANION GAP SERPL CALC-SCNC: 10 MMOL/L (ref 5–19)
AST SERPL-CCNC: 30 U/L (ref 14–36)
BILIRUB DIRECT SERPL-MCNC: 0.4 MG/DL (ref 0–0.4)
BILIRUB SERPL-MCNC: 0.6 MG/DL (ref 0.2–1.3)
BUN SERPL-MCNC: 18 MG/DL (ref 7–20)
CALCIUM: 8.8 MG/DL (ref 8.4–10.2)
CHLORIDE SERPL-SCNC: 96 MMOL/L (ref 98–107)
CO2 SERPL-SCNC: 27 MMOL/L (ref 22–30)
CRP SERPL-MCNC: 253 MG/L (ref ?–10)
ERYTHROCYTE [DISTWIDTH] IN BLOOD BY AUTOMATED COUNT: 15.1 % (ref 11.5–14)
GLUCOSE SERPL-MCNC: 204 MG/DL (ref 75–110)
HCT VFR BLD CALC: 37.2 % (ref 36–47)
HGB BLD-MCNC: 11.9 G/DL (ref 12–15.5)
MCH RBC QN AUTO: 29.7 PG (ref 27–33.4)
MCHC RBC AUTO-ENTMCNC: 32 G/DL (ref 32–36)
MCV RBC AUTO: 93 FL (ref 80–97)
PLATELET # BLD: 338 10^3/UL (ref 150–450)
POTASSIUM SERPL-SCNC: 4.6 MMOL/L (ref 3.6–5)
PROT SERPL-MCNC: 6 G/DL (ref 6.3–8.2)
RBC # BLD AUTO: 4.01 10^6/UL (ref 3.72–5.28)
WBC # BLD AUTO: 15.3 10^3/UL (ref 4–10.5)

## 2020-09-04 PROCEDURE — XW033E5 INTRODUCTION OF REMDESIVIR ANTI-INFECTIVE INTO PERIPHERAL VEIN, PERCUTANEOUS APPROACH, NEW TECHNOLOGY GROUP 5: ICD-10-PCS | Performed by: INTERNAL MEDICINE

## 2020-09-04 RX ADMIN — FUROSEMIDE SCH MG: 10 INJECTION, SOLUTION INTRAMUSCULAR; INTRAVENOUS at 09:46

## 2020-09-04 RX ADMIN — NYSTATIN CREAM SCH APPLIC: 100000 CREAM TOPICAL at 11:00

## 2020-09-04 RX ADMIN — NYSTATIN CREAM SCH APPLIC: 100000 CREAM TOPICAL at 17:00

## 2020-09-04 RX ADMIN — PREGABALIN SCH MG: 75 CAPSULE ORAL at 09:46

## 2020-09-04 RX ADMIN — AZITHROMYCIN MONOHYDRATE SCH MLS/HR: 500 INJECTION, POWDER, LYOPHILIZED, FOR SOLUTION INTRAVENOUS at 21:22

## 2020-09-04 RX ADMIN — LORAZEPAM PRN MG: 2 INJECTION INTRAMUSCULAR; INTRAVENOUS at 09:46

## 2020-09-04 RX ADMIN — CEFTRIAXONE SCH MLS/HR: 1 INJECTION, SOLUTION INTRAVENOUS at 17:00

## 2020-09-04 RX ADMIN — DEXAMETHASONE SODIUM PHOSPHATE SCH MG: 4 INJECTION, SOLUTION INTRAMUSCULAR; INTRAVENOUS at 05:10

## 2020-09-04 RX ADMIN — INSULIN HUMAN SCH: 100 INJECTION, SOLUTION PARENTERAL at 08:38

## 2020-09-04 RX ADMIN — INSULIN HUMAN SCH: 100 INJECTION, SOLUTION PARENTERAL at 16:40

## 2020-09-04 RX ADMIN — METOPROLOL SUCCINATE SCH MG: 25 TABLET, EXTENDED RELEASE ORAL at 21:24

## 2020-09-04 RX ADMIN — FAMOTIDINE SCH MG: 20 TABLET, FILM COATED ORAL at 21:22

## 2020-09-04 RX ADMIN — INSULIN HUMAN SCH: 100 INJECTION, SOLUTION PARENTERAL at 12:40

## 2020-09-04 RX ADMIN — OXYCODONE HYDROCHLORIDE AND ACETAMINOPHEN SCH MG: 500 TABLET ORAL at 09:46

## 2020-09-04 RX ADMIN — METOPROLOL SUCCINATE SCH MG: 25 TABLET, EXTENDED RELEASE ORAL at 09:47

## 2020-09-04 RX ADMIN — VITAMIN D, TAB 1000IU (100/BT) SCH UNIT: 25 TAB at 09:47

## 2020-09-04 RX ADMIN — ENOXAPARIN SODIUM SCH MG: 120 INJECTION SUBCUTANEOUS at 21:21

## 2020-09-04 RX ADMIN — DOCUSATE SODIUM SCH: 100 CAPSULE, LIQUID FILLED ORAL at 18:25

## 2020-09-04 RX ADMIN — NYSTATIN CREAM SCH: 100000 CREAM TOPICAL at 09:50

## 2020-09-04 RX ADMIN — DOCUSATE SODIUM SCH MG: 100 CAPSULE, LIQUID FILLED ORAL at 09:47

## 2020-09-04 RX ADMIN — Medication SCH ML: at 21:25

## 2020-09-04 RX ADMIN — PREGABALIN SCH MG: 75 CAPSULE ORAL at 21:22

## 2020-09-04 RX ADMIN — Medication SCH: at 05:06

## 2020-09-04 RX ADMIN — LISINOPRIL SCH MG: 5 TABLET ORAL at 09:46

## 2020-09-04 RX ADMIN — DIPHENHYDRAMINE HYDROCHLORIDE SCH MG: 50 CAPSULE ORAL at 21:24

## 2020-09-04 RX ADMIN — DEXAMETHASONE SODIUM PHOSPHATE SCH MG: 4 INJECTION, SOLUTION INTRAMUSCULAR; INTRAVENOUS at 13:00

## 2020-09-04 RX ADMIN — INSULIN HUMAN SCH UNIT: 100 INJECTION, SOLUTION PARENTERAL at 21:21

## 2020-09-04 RX ADMIN — AMITRIPTYLINE HYDROCHLORIDE SCH MG: 25 TABLET, FILM COATED ORAL at 21:24

## 2020-09-04 RX ADMIN — FAMOTIDINE SCH MG: 20 TABLET, FILM COATED ORAL at 09:46

## 2020-09-04 RX ADMIN — Medication SCH: at 13:00

## 2020-09-04 RX ADMIN — DEXAMETHASONE SODIUM PHOSPHATE SCH MG: 4 INJECTION, SOLUTION INTRAMUSCULAR; INTRAVENOUS at 21:22

## 2020-09-04 RX ADMIN — OXYCODONE HYDROCHLORIDE AND ACETAMINOPHEN SCH MG: 500 TABLET ORAL at 17:00

## 2020-09-04 RX ADMIN — ENOXAPARIN SODIUM SCH MG: 120 INJECTION SUBCUTANEOUS at 09:48

## 2020-09-05 LAB
ALBUMIN SERPL-MCNC: 3 G/DL (ref 3.5–5)
ALP SERPL-CCNC: 96 U/L (ref 38–126)
ANION GAP SERPL CALC-SCNC: 11 MMOL/L (ref 5–19)
AST SERPL-CCNC: 27 U/L (ref 14–36)
BILIRUB DIRECT SERPL-MCNC: 0.4 MG/DL (ref 0–0.4)
BILIRUB SERPL-MCNC: 0.5 MG/DL (ref 0.2–1.3)
BUN SERPL-MCNC: 17 MG/DL (ref 7–20)
CALCIUM: 8.7 MG/DL (ref 8.4–10.2)
CHLORIDE SERPL-SCNC: 96 MMOL/L (ref 98–107)
CO2 SERPL-SCNC: 28 MMOL/L (ref 22–30)
CRP SERPL-MCNC: 229 MG/L (ref ?–10)
ERYTHROCYTE [DISTWIDTH] IN BLOOD BY AUTOMATED COUNT: 15.5 % (ref 11.5–14)
GLUCOSE SERPL-MCNC: 223 MG/DL (ref 75–110)
HCT VFR BLD CALC: 35.4 % (ref 36–47)
HGB BLD-MCNC: 11.6 G/DL (ref 12–15.5)
MCH RBC QN AUTO: 30.4 PG (ref 27–33.4)
MCHC RBC AUTO-ENTMCNC: 32.8 G/DL (ref 32–36)
MCV RBC AUTO: 93 FL (ref 80–97)
PLATELET # BLD: 354 10^3/UL (ref 150–450)
POTASSIUM SERPL-SCNC: 4.3 MMOL/L (ref 3.6–5)
PROT SERPL-MCNC: 5.7 G/DL (ref 6.3–8.2)
RBC # BLD AUTO: 3.82 10^6/UL (ref 3.72–5.28)
WBC # BLD AUTO: 11.3 10^3/UL (ref 4–10.5)

## 2020-09-05 RX ADMIN — DEXAMETHASONE SODIUM PHOSPHATE SCH MG: 4 INJECTION, SOLUTION INTRAMUSCULAR; INTRAVENOUS at 16:22

## 2020-09-05 RX ADMIN — METOPROLOL SUCCINATE SCH MG: 25 TABLET, EXTENDED RELEASE ORAL at 09:37

## 2020-09-05 RX ADMIN — ENOXAPARIN SODIUM SCH MG: 120 INJECTION SUBCUTANEOUS at 21:10

## 2020-09-05 RX ADMIN — NYSTATIN CREAM SCH APPLIC: 100000 CREAM TOPICAL at 12:57

## 2020-09-05 RX ADMIN — PREGABALIN SCH MG: 75 CAPSULE ORAL at 21:11

## 2020-09-05 RX ADMIN — FAMOTIDINE SCH MG: 20 TABLET, FILM COATED ORAL at 21:11

## 2020-09-05 RX ADMIN — DEXAMETHASONE SODIUM PHOSPHATE SCH MG: 4 INJECTION, SOLUTION INTRAMUSCULAR; INTRAVENOUS at 05:09

## 2020-09-05 RX ADMIN — DOCUSATE SODIUM SCH MG: 100 CAPSULE, LIQUID FILLED ORAL at 09:37

## 2020-09-05 RX ADMIN — CEFTRIAXONE SCH MLS/HR: 1 INJECTION, SOLUTION INTRAVENOUS at 17:41

## 2020-09-05 RX ADMIN — ARIPIPRAZOLE SCH MG: 5 TABLET ORAL at 21:12

## 2020-09-05 RX ADMIN — FUROSEMIDE SCH MG: 10 INJECTION, SOLUTION INTRAMUSCULAR; INTRAVENOUS at 09:37

## 2020-09-05 RX ADMIN — AMITRIPTYLINE HYDROCHLORIDE SCH MG: 25 TABLET, FILM COATED ORAL at 21:12

## 2020-09-05 RX ADMIN — METOPROLOL SUCCINATE SCH MG: 25 TABLET, EXTENDED RELEASE ORAL at 21:11

## 2020-09-05 RX ADMIN — FAMOTIDINE SCH MG: 20 TABLET, FILM COATED ORAL at 09:37

## 2020-09-05 RX ADMIN — INSULIN HUMAN SCH UNIT: 100 INJECTION, SOLUTION PARENTERAL at 21:10

## 2020-09-05 RX ADMIN — Medication SCH ML: at 21:12

## 2020-09-05 RX ADMIN — ENOXAPARIN SODIUM SCH MG: 120 INJECTION SUBCUTANEOUS at 09:38

## 2020-09-05 RX ADMIN — OXYCODONE HYDROCHLORIDE AND ACETAMINOPHEN SCH MG: 500 TABLET ORAL at 09:37

## 2020-09-05 RX ADMIN — LISINOPRIL SCH MG: 5 TABLET ORAL at 09:37

## 2020-09-05 RX ADMIN — INSULIN HUMAN SCH UNIT: 100 INJECTION, SOLUTION PARENTERAL at 09:42

## 2020-09-05 RX ADMIN — Medication SCH ML: at 16:22

## 2020-09-05 RX ADMIN — DIPHENHYDRAMINE HYDROCHLORIDE SCH MG: 50 CAPSULE ORAL at 21:12

## 2020-09-05 RX ADMIN — AZITHROMYCIN MONOHYDRATE SCH MLS/HR: 500 INJECTION, POWDER, LYOPHILIZED, FOR SOLUTION INTRAVENOUS at 21:09

## 2020-09-05 RX ADMIN — VITAMIN D, TAB 1000IU (100/BT) SCH UNIT: 25 TAB at 09:37

## 2020-09-05 RX ADMIN — DEXAMETHASONE SODIUM PHOSPHATE SCH MG: 4 INJECTION, SOLUTION INTRAMUSCULAR; INTRAVENOUS at 21:10

## 2020-09-05 RX ADMIN — DOCUSATE SODIUM SCH MG: 100 CAPSULE, LIQUID FILLED ORAL at 17:40

## 2020-09-05 RX ADMIN — OXYCODONE HYDROCHLORIDE AND ACETAMINOPHEN SCH MG: 500 TABLET ORAL at 17:40

## 2020-09-05 RX ADMIN — Medication SCH ML: at 05:10

## 2020-09-05 RX ADMIN — REMDESIVIR SCH MLS/HR: 100 INJECTION, POWDER, LYOPHILIZED, FOR SOLUTION INTRAVENOUS at 22:39

## 2020-09-05 RX ADMIN — NYSTATIN CREAM SCH APPLIC: 100000 CREAM TOPICAL at 17:42

## 2020-09-05 RX ADMIN — Medication PRN MG: at 23:09

## 2020-09-05 RX ADMIN — INSULIN HUMAN SCH UNIT: 100 INJECTION, SOLUTION PARENTERAL at 17:41

## 2020-09-05 RX ADMIN — REMDESIVIR SCH MLS/HR: 100 INJECTION, POWDER, LYOPHILIZED, FOR SOLUTION INTRAVENOUS at 00:27

## 2020-09-05 RX ADMIN — VENLAFAXINE HYDROCHLORIDE SCH MG: 75 CAPSULE, EXTENDED RELEASE ORAL at 16:22

## 2020-09-05 RX ADMIN — INSULIN GLARGINE SCH UNIT: 100 INJECTION, SOLUTION SUBCUTANEOUS at 21:11

## 2020-09-05 RX ADMIN — PREGABALIN SCH MG: 75 CAPSULE ORAL at 09:37

## 2020-09-05 RX ADMIN — INSULIN HUMAN SCH UNIT: 100 INJECTION, SOLUTION PARENTERAL at 12:56

## 2020-09-05 NOTE — PDOC PROGRESS REPORT
Subjective


Progress Note for:: 09/05/20


Subjective:: 





The patient is resting in bed.  She is more comfortable than yesterday.  He 

states that she did not sleep well last night.  She remains on 5 to 6 L nasal 

cannula.  Nurse also reports that the patient complained of just feeling achy 

all over earlier.


Reason For Visit: 


COMMUNITY ACQUIRED BILATERAL MULTIFOCAL PNEUMONIA,








Physical Exam


Vital Signs: 


                                        











Temp Pulse Resp BP Pulse Ox


 


 97.8 F   58 L  20   141/73 H  92 


 


 09/05/20 11:23  09/05/20 13:50  09/05/20 11:23  09/05/20 11:23  09/05/20 11:23








                                 Intake & Output











 09/04/20 09/05/20 09/06/20





 06:59 06:59 06:59


 


Intake Total 2273 1037 120


 


Output Total 1400 700 


 


Balance 873 337 120


 


Weight 111.2 kg 109.6 kg 











General appearance: PRESENT: no acute distress, cooperative, morbidly obese, 

well-developed


Head exam: PRESENT: atraumatic, normocephalic


Eye exam: PRESENT: other - Barely opens her eyes during the discussion


Ear exam: PRESENT: normal external ear exam.  ABSENT: bleeding, drainage


Respiratory exam: PRESENT: rales - Faint, symmetrical, unlabored, other - Still 

somewhat limited inspiratory phase..  ABSENT: rhonchi, tachypnea, wheezes


Cardiovascular exam: PRESENT: RRR, +S1, +S2.  ABSENT: bradycardia, diastolic 

murmur, irregular rhythm, systolic murmur, tachycardia


GI/Abdominal exam: PRESENT: normal bowel sounds, soft.  ABSENT: tenderness


Rectal exam: PRESENT: deferred


Gentrourinary exam: ABSENT: indwelling catheter


Extremities exam: ABSENT: pedal edema


Musculoskeletal exam: PRESENT: ambulatory - Still very weak with ambulation, 

normal inspection.  ABSENT: deformity, dislocation


Neurological exam: PRESENT: awake, oriented to person, oriented to place, 

oriented to situation.  ABSENT: alert - Still somewhat sleepy


Psychiatric exam: PRESENT: unusual affect.  ABSENT: agitated, anxious


Focused psych exam: ABSENT: delusional, paranoid, restlessness


Skin exam: PRESENT: dry, warm.  ABSENT: rash





Results


Laboratory Results: 


                                        





                                 09/05/20 06:40 





                                 09/05/20 06:40 





                                        











  09/05/20 09/05/20





  06:40 06:40


 


WBC  11.3 H 


 


RBC  3.82 


 


Hgb  11.6 L 


 


Hct  35.4 L 


 


MCV  93 


 


MCH  30.4 


 


MCHC  32.8 


 


RDW  15.5 H 


 


Plt Count  354 


 


Sodium   135.0 L


 


Potassium   4.3


 


Chloride   96 L


 


Carbon Dioxide   28


 


Anion Gap   11


 


BUN   17


 


Creatinine   0.83


 


Est GFR (African Amer)   > 60


 


Glucose   223 H


 


Calcium   8.7


 


Magnesium   2.0


 


Total Bilirubin   0.5


 


AST   27


 


Alkaline Phosphatase   96


 


C-Reactive Protein   229.0 H


 


Total Protein   5.7 L


 


Albumin   3.0 L








                                        











  09/01/20





  14:47


 


Troponin I  < 0.012











Impressions: 


                                        





Chest X-Ray  09/01/20 15:25


IMPRESSION:  HEART ENLARGED WITHOUT FAILURE.  NO OTHER SIGNIFICANT RADIOGRAPHIC 

FINDING IN THE CHEST.


 








Chest/Abdomen CTA  09/01/20 16:59


IMPRESSION:


 


1.  No central pulmonary embolism.


2.   Commonly reported imaging features of (COVID-19 or viral)


pneumonia are present. Other processes such as influenza


pneumonia and organizing pneumonia, as can be seen with drug


toxicity and connective tissue disease, can cause a similar


imaging pattern. [PneTyp] 


 














Assessment and Plan





- Diagnosis


(1) Pneumonia due to SARS-associated coronavirus


Is this a current diagnosis for this admission?: Yes   


Plan: 


Still with high oxygen needs.  Breathing comfortably.  No wheezes today.  Still 

asking when she is going home but was more receptive to the discussion today.  

Continue current antibiotic/antiviral regimen.








(2) Acute respiratory failure with hypoxia


Is this a current diagnosis for this admission?: Yes   


Plan: 


Treatment for COVID pneumonia as above.  Slowly taper oxygen to room air.








(3) UTI (urinary tract infection)


Qualifiers: 


   Urinary tract infection type: site unspecified   Hematuria presence: without 

hematuria   Qualified Code(s): N39.0 - Urinary tract infection, site not 

specified   


Is this a current diagnosis for this admission?: Yes   


Plan: 


Will be adequately treated with ceftriaxone








(4) Hyponatremia


Is this a current diagnosis for this admission?: Yes   


Plan: 


Still with very mild low sodium.  Continue to monitor at this time.








(5) Chronic diastolic congestive heart failure


Is this a current diagnosis for this admission?: Yes   


Plan: 


Still with net positive fluid balance however we are unable to have accurate 

intake and output as the patient will have incontinence.  Continue to monitor 

the patient clinically.  The patient is already on a low dose of furosemide.








(6) Fever


Qualifiers: 


   Fever type: unspecified   Qualified Code(s): R50.9 - Fever, unspecified   


Is this a current diagnosis for this admission?: Yes   


Plan: 


Secondary to cover pneumonia


Afebrile for 2 days now.  Continue current treatment plan.








(7) Paroxysmal atrial fibrillation


Is this a current diagnosis for this admission?: Yes   


Plan: 


Borderline bradycardic on metoprolol.  Currently anticoagulated with therapeutic

 dose Lovenox.  Will return to oral anticoagulation once d-dimer is less than 

1.0.








(8) Hyperglycemia due to diabetes mellitus


Is this a current diagnosis for this admission?: Yes   


Plan: 


We will add low-dose Lantus at night to see if we can even out her Accu-Cheks.  

She is on extended release glipizide at home as well as other oral medications. 

 The elevated sugars also are response to the IV dexamethasone.  We will 

continue to monitor Accu-Cheks and utilize sliding scale with the new dose of 

Lantus.








(9) Hypertension


Qualifiers: 


   Hypertension type: essential hypertension   Qualified Code(s): I10 - 

Essential (primary) hypertension   


Is this a current diagnosis for this admission?: Yes   


Plan: 


Slightly elevated blood pressures.  We will continue the current regimen at this

 time.  As she is been on lisinopril prior to being infected with COVID we may 

increase the lisinopril dose very slightly.








(10) Morbid obesity with BMI of 40.0-44.9, adult


Is this a current diagnosis for this admission?: Yes   


Plan: 


1 she recovers she should consider diet and exercise.








(11) Schizoaffective disorder


Qualifiers: 


   Schizoaffective disorder type: unspecified   Qualified Code(s): F25.9 - 

Schizoaffective disorder, unspecified   


Is this a current diagnosis for this admission?: Yes   


Plan: 


Now that her daughter has dropped off her medications I have asked pharmacy to 

review and continue medications that we do not have on formulary.








(12) Community acquired pneumonia, bilateral


Is this a current diagnosis for this admission?: Yes   


Plan: 


Her pneumonia is caused by COVID virus











- Plan Summary


Summary: 


Patient will be admitted to the medical floor where she will receive routine 

supportive and symptomatic cares.  She will be treated with IV antibiotics 

utilizing Rocephin and azithromycin.  She will receive supplemental oxygen via 

nasal cannula in order to maintain an adequate oxygen saturation.  She will 

receive her usual inhaler medications for her chronic COPD/asthma.  She will 

receive IV dexamethasone 10 mg initially followed by 6 mg/day.  She will receive

 IV fluids with lactated Ringer's solution at 250 mL/h initially.  She will 

receive morphine sulfate 2 to 4 mg IV every 2 hours as needed for pain.  She 

will receive Ativan 1 mg IV every 4 hours as needed for anxiety or restlessness.

  Before meals and at bedtime Accu-Cheks will be performed with sliding scale 

insulin for hyperglycemia and a hypoglycemic protocol in place.  Patient's usual

 home medications will be resumed, as appropriate, as soon as her medication 

list has been verified and reconciled.  She will receive a diabetic, cardiac and

 prerenal restricted diet.





- Time


Time Spent with patient: 15-24 minutes


Medications reviewed and adjusted accordingly: Yes


Anticipated Discharge Disposition: Unsure.  Possibly home health versus skilled 

facility


Anticipated Discharge Timeframe: Unknown

## 2020-09-06 LAB
ADD MANUAL DIFF: NO
ANION GAP SERPL CALC-SCNC: 10 MMOL/L (ref 5–19)
BASOPHILS # BLD AUTO: 0 10^3/UL (ref 0–0.2)
BASOPHILS NFR BLD AUTO: 0.3 % (ref 0–2)
BUN SERPL-MCNC: 18 MG/DL (ref 7–20)
CALCIUM: 8.7 MG/DL (ref 8.4–10.2)
CHLORIDE SERPL-SCNC: 93 MMOL/L (ref 98–107)
CO2 SERPL-SCNC: 31 MMOL/L (ref 22–30)
CRP SERPL-MCNC: 163.1 MG/L (ref ?–10)
EOSINOPHIL # BLD AUTO: 0 10^3/UL (ref 0–0.6)
EOSINOPHIL NFR BLD AUTO: 0 % (ref 0–6)
ERYTHROCYTE [DISTWIDTH] IN BLOOD BY AUTOMATED COUNT: 15.2 % (ref 11.5–14)
GLUCOSE SERPL-MCNC: 296 MG/DL (ref 75–110)
HCT VFR BLD CALC: 35.1 % (ref 36–47)
HGB BLD-MCNC: 11.7 G/DL (ref 12–15.5)
LYMPHOCYTES # BLD AUTO: 0.9 10^3/UL (ref 0.5–4.7)
LYMPHOCYTES NFR BLD AUTO: 11.7 % (ref 13–45)
MCH RBC QN AUTO: 31 PG (ref 27–33.4)
MCHC RBC AUTO-ENTMCNC: 33.5 G/DL (ref 32–36)
MCV RBC AUTO: 93 FL (ref 80–97)
MONOCYTES # BLD AUTO: 0.4 10^3/UL (ref 0.1–1.4)
MONOCYTES NFR BLD AUTO: 5 % (ref 3–13)
NEUTROPHILS # BLD AUTO: 6.4 10^3/UL (ref 1.7–8.2)
NEUTS SEG NFR BLD AUTO: 83 % (ref 42–78)
PLATELET # BLD: 352 10^3/UL (ref 150–450)
POTASSIUM SERPL-SCNC: 4.2 MMOL/L (ref 3.6–5)
RBC # BLD AUTO: 3.79 10^6/UL (ref 3.72–5.28)
TOTAL CELLS COUNTED % (AUTO): 100 %
WBC # BLD AUTO: 7.7 10^3/UL (ref 4–10.5)

## 2020-09-06 RX ADMIN — INSULIN HUMAN SCH UNIT: 100 INJECTION, SOLUTION PARENTERAL at 08:20

## 2020-09-06 RX ADMIN — NYSTATIN CREAM SCH APPLIC: 100000 CREAM TOPICAL at 17:51

## 2020-09-06 RX ADMIN — VITAMIN D, TAB 1000IU (100/BT) SCH UNIT: 25 TAB at 10:53

## 2020-09-06 RX ADMIN — METOPROLOL SUCCINATE SCH MG: 25 TABLET, EXTENDED RELEASE ORAL at 10:52

## 2020-09-06 RX ADMIN — ENOXAPARIN SODIUM SCH MG: 120 INJECTION SUBCUTANEOUS at 21:35

## 2020-09-06 RX ADMIN — FUROSEMIDE SCH MG: 10 INJECTION, SOLUTION INTRAMUSCULAR; INTRAVENOUS at 10:51

## 2020-09-06 RX ADMIN — CEFTRIAXONE SCH MLS/HR: 1 INJECTION, SOLUTION INTRAVENOUS at 17:50

## 2020-09-06 RX ADMIN — Medication PRN MG: at 21:45

## 2020-09-06 RX ADMIN — OXYCODONE HYDROCHLORIDE AND ACETAMINOPHEN SCH MG: 500 TABLET ORAL at 17:50

## 2020-09-06 RX ADMIN — FAMOTIDINE SCH MG: 20 TABLET, FILM COATED ORAL at 21:38

## 2020-09-06 RX ADMIN — INSULIN HUMAN SCH UNIT: 100 INJECTION, SOLUTION PARENTERAL at 11:58

## 2020-09-06 RX ADMIN — METOPROLOL SUCCINATE SCH MG: 25 TABLET, EXTENDED RELEASE ORAL at 21:36

## 2020-09-06 RX ADMIN — LISINOPRIL SCH MG: 5 TABLET ORAL at 10:52

## 2020-09-06 RX ADMIN — NYSTATIN CREAM SCH APPLIC: 100000 CREAM TOPICAL at 11:54

## 2020-09-06 RX ADMIN — DEXAMETHASONE SODIUM PHOSPHATE SCH MG: 4 INJECTION, SOLUTION INTRAMUSCULAR; INTRAVENOUS at 06:03

## 2020-09-06 RX ADMIN — REMDESIVIR SCH MLS/HR: 100 INJECTION, POWDER, LYOPHILIZED, FOR SOLUTION INTRAVENOUS at 23:07

## 2020-09-06 RX ADMIN — ARIPIPRAZOLE SCH MG: 5 TABLET ORAL at 21:35

## 2020-09-06 RX ADMIN — VENLAFAXINE HYDROCHLORIDE SCH MG: 75 CAPSULE, EXTENDED RELEASE ORAL at 10:50

## 2020-09-06 RX ADMIN — INSULIN HUMAN SCH UNIT: 100 INJECTION, SOLUTION PARENTERAL at 17:50

## 2020-09-06 RX ADMIN — DEXAMETHASONE SODIUM PHOSPHATE SCH MG: 4 INJECTION, SOLUTION INTRAMUSCULAR; INTRAVENOUS at 21:35

## 2020-09-06 RX ADMIN — ENOXAPARIN SODIUM SCH MG: 120 INJECTION SUBCUTANEOUS at 10:51

## 2020-09-06 RX ADMIN — FAMOTIDINE SCH MG: 20 TABLET, FILM COATED ORAL at 10:52

## 2020-09-06 RX ADMIN — PREGABALIN SCH MG: 75 CAPSULE ORAL at 21:36

## 2020-09-06 RX ADMIN — OXYCODONE HYDROCHLORIDE AND ACETAMINOPHEN SCH MG: 500 TABLET ORAL at 10:52

## 2020-09-06 RX ADMIN — AZITHROMYCIN MONOHYDRATE SCH MLS/HR: 500 INJECTION, POWDER, LYOPHILIZED, FOR SOLUTION INTRAVENOUS at 21:40

## 2020-09-06 RX ADMIN — DEXAMETHASONE SODIUM PHOSPHATE SCH MG: 4 INJECTION, SOLUTION INTRAMUSCULAR; INTRAVENOUS at 14:17

## 2020-09-06 RX ADMIN — AMITRIPTYLINE HYDROCHLORIDE SCH MG: 25 TABLET, FILM COATED ORAL at 21:39

## 2020-09-06 RX ADMIN — DOCUSATE SODIUM SCH MG: 100 CAPSULE, LIQUID FILLED ORAL at 17:50

## 2020-09-06 RX ADMIN — INSULIN GLARGINE SCH UNIT: 100 INJECTION, SOLUTION SUBCUTANEOUS at 21:38

## 2020-09-06 RX ADMIN — DOCUSATE SODIUM SCH MG: 100 CAPSULE, LIQUID FILLED ORAL at 10:50

## 2020-09-06 RX ADMIN — Medication SCH ML: at 14:17

## 2020-09-06 RX ADMIN — Medication SCH: at 06:03

## 2020-09-06 RX ADMIN — Medication SCH ML: at 21:38

## 2020-09-06 RX ADMIN — PREGABALIN SCH MG: 75 CAPSULE ORAL at 10:52

## 2020-09-06 RX ADMIN — DIPHENHYDRAMINE HYDROCHLORIDE SCH MG: 50 CAPSULE ORAL at 21:39

## 2020-09-06 RX ADMIN — INSULIN HUMAN SCH UNIT: 100 INJECTION, SOLUTION PARENTERAL at 21:38

## 2020-09-06 NOTE — PDOC PROGRESS REPORT
Subjective


Progress Note for:: 09/06/20


Subjective:: 





Resting in bed.  She continues to shake her leg as she is anxious.  She appears 

more comfortable than yesterday.


Reason For Visit: 


COMMUNITY ACQUIRED BILATERAL MULTIFOCAL PNEUMONIA,








Physical Exam


Vital Signs: 


                                        











Temp Pulse Resp BP Pulse Ox


 


 97.5 F   62   17   110/54 L  93 


 


 09/06/20 11:45  09/06/20 14:00  09/06/20 08:16  09/06/20 11:45  09/06/20 11:45








                                 Intake & Output











 09/05/20 09/06/20 09/07/20





 06:59 06:59 06:59


 


Intake Total 1037 1040 


 


Output Total 700  


 


Balance 337 1040 


 


Weight 109.6 kg 107.7 kg 











General appearance: PRESENT: cooperative, mild distress, well-developed


Head exam: PRESENT: atraumatic, normocephalic


Mouth exam: PRESENT: moist, tongue midline


Respiratory exam: PRESENT: rales, symmetrical, unlabored.  ABSENT: rhonchi, 

tachypnea, wheezes


Cardiovascular exam: PRESENT: RRR, +S1, +S2.  ABSENT: bradycardia, diastolic 

murmur, irregular rhythm, systolic murmur, tachycardia


GI/Abdominal exam: PRESENT: normal bowel sounds, soft.  ABSENT: distended, 

guarding, tenderness


Rectal exam: PRESENT: deferred


Gentrourinary exam: ABSENT: indwelling catheter


Extremities exam: ABSENT: pedal edema


Neurological exam: PRESENT: alert, awake, oriented to person, oriented to place,

oriented to time, oriented to situation, CN II-XII grossly intact, other - very 

fidgity.  ABSENT: altered


Psychiatric exam: PRESENT: appropriate affect.  ABSENT: agitated, anxious, 

unusual affect


Focused psych exam: ABSENT: delusional, paranoid, restlessness





Results


Laboratory Results: 


                                        





                                 09/06/20 04:39 





                                 09/06/20 04:39 





                                        











  09/06/20 09/06/20





  04:39 04:39


 


WBC  7.7 


 


RBC  3.79 


 


Hgb  11.7 L 


 


Hct  35.1 L 


 


MCV  93 


 


MCH  31.0 


 


MCHC  33.5 


 


RDW  15.2 H 


 


Plt Count  352 


 


Seg Neutrophils %  83.0 H 


 


Sodium   133.7 L


 


Potassium   4.2


 


Chloride   93 L


 


Carbon Dioxide   31 H


 


Anion Gap   10


 


BUN   18


 


Creatinine   0.77


 


Est GFR (African Amer)   > 60


 


Glucose   296 H


 


Calcium   8.7


 


Magnesium   2.1


 


C-Reactive Protein   163.1 H








                                        











  09/01/20





  14:47


 


Troponin I  < 0.012











Impressions: 


                                        





Chest X-Ray  09/01/20 15:25


IMPRESSION:  HEART ENLARGED WITHOUT FAILURE.  NO OTHER SIGNIFICANT RADIOGRAPHIC 

FINDING IN THE CHEST.


 








Chest/Abdomen CTA  09/01/20 16:59


IMPRESSION:


 


1.  No central pulmonary embolism.


2.   Commonly reported imaging features of (COVID-19 or viral)


pneumonia are present. Other processes such as influenza


pneumonia and organizing pneumonia, as can be seen with drug


toxicity and connective tissue disease, can cause a similar


imaging pattern. [PneTyp] 


 














Assessment and Plan





- Diagnosis


(1) Pneumonia due to SARS-associated coronavirus


Is this a current diagnosis for this admission?: Yes   


Plan: 


stable on nasal canula








(2) Acute respiratory failure with hypoxia


Is this a current diagnosis for this admission?: Yes   


Plan: 


still attempting to wean from oxygen








(3) UTI (urinary tract infection)


Qualifiers: 


   Urinary tract infection type: site unspecified   Hematuria presence: without 

hematuria   Qualified Code(s): N39.0 - Urinary tract infection, site not 

specified   


Is this a current diagnosis for this admission?: Yes   


Plan: 


Strep group G resolved with antibiotics








(4) Hyponatremia


Is this a current diagnosis for this admission?: Yes   


Plan: 


sodium still just below the lower limit normal. Continue to monitor








(5) Chronic diastolic congestive heart failure


Is this a current diagnosis for this admission?: Yes   


Plan: 


Stable. Continue current regimen








(6) Fever


Qualifiers: 


   Fever type: unspecified   Qualified Code(s): R50.9 - Fever, unspecified   


Is this a current diagnosis for this admission?: Yes   


Plan: 


afebrile since 9/4/20








(7) Paroxysmal atrial fibrillation


Is this a current diagnosis for this admission?: Yes   


Plan: 


heart rate well controlled. Continue current meds








(8) Hyperglycemia due to diabetes mellitus


Is this a current diagnosis for this admission?: Yes   


Plan: 


accuchecks still variable and elevated. Increase lantus








(9) Hypertension


Qualifiers: 


   Hypertension type: essential hypertension   Qualified Code(s): I10 - 

Essential (primary) hypertension   


Is this a current diagnosis for this admission?: Yes   


Plan: 


no changes. Continue current regimen








(10) Morbid obesity with BMI of 40.0-44.9, adult


Is this a current diagnosis for this admission?: Yes   


Plan: 


1 she recovers she should consider diet and exercise.








(11) Schizoaffective disorder


Qualifiers: 


   Schizoaffective disorder type: unspecified   Qualified Code(s): F25.9 - 

Schizoaffective disorder, unspecified   


Is this a current diagnosis for this admission?: Yes   


Plan: 


Now that her daughter has dropped off her medications I have asked pharmacy to 

review and continue medications that we do not have on formulary.








(12) Community acquired pneumonia, bilateral


Is this a current diagnosis for this admission?: Yes   


Plan: 


Her pneumonia is caused by COVID virus











- Plan Summary


Summary: 


Patient will be admitted to the medical floor where she will receive routine 

supportive and symptomatic cares.  She will be treated with IV antibiotics 

utilizing Rocephin and azithromycin.  She will receive supplemental oxygen via 

nasal cannula in order to maintain an adequate oxygen saturation.  She will 

receive her usual inhaler medications for her chronic COPD/asthma.  She will 

receive IV dexamethasone 10 mg initially followed by 6 mg/day.  She will receive

 IV fluids with lactated Ringer's solution at 250 mL/h initially.  She will 

receive morphine sulfate 2 to 4 mg IV every 2 hours as needed for pain.  She 

will receive Ativan 1 mg IV every 4 hours as needed for anxiety or restlessness.

  Before meals and at bedtime Accu-Cheks will be performed with sliding scale 

insulin for hyperglycemia and a hypoglycemic protocol in place.  Patient's usual

 home medications will be resumed, as appropriate, as soon as her medication 

list has been verified and reconciled.  She will receive a diabetic, cardiac and

 prerenal restricted diet.





- Time


Time Spent with patient: 15-24 minutes


Medications reviewed and adjusted accordingly: Yes


Anticipated Discharge Disposition: Home with Home Health


Anticipated Discharge Timeframe: 4-5 days

## 2020-09-07 LAB
ERYTHROCYTE [DISTWIDTH] IN BLOOD BY AUTOMATED COUNT: 15 % (ref 11.5–14)
HCT VFR BLD CALC: 39.1 % (ref 36–47)
HGB BLD-MCNC: 12.8 G/DL (ref 12–15.5)
MCH RBC QN AUTO: 30.2 PG (ref 27–33.4)
MCHC RBC AUTO-ENTMCNC: 32.7 G/DL (ref 32–36)
MCV RBC AUTO: 92 FL (ref 80–97)
PLATELET # BLD: 439 10^3/UL (ref 150–450)
RBC # BLD AUTO: 4.24 10^6/UL (ref 3.72–5.28)
WBC # BLD AUTO: 6 10^3/UL (ref 4–10.5)

## 2020-09-07 RX ADMIN — FUROSEMIDE SCH MG: 10 INJECTION, SOLUTION INTRAMUSCULAR; INTRAVENOUS at 10:28

## 2020-09-07 RX ADMIN — OXYCODONE HYDROCHLORIDE AND ACETAMINOPHEN SCH MG: 500 TABLET ORAL at 17:07

## 2020-09-07 RX ADMIN — DOCUSATE SODIUM SCH: 100 CAPSULE, LIQUID FILLED ORAL at 17:00

## 2020-09-07 RX ADMIN — DEXAMETHASONE SODIUM PHOSPHATE SCH MG: 4 INJECTION, SOLUTION INTRAMUSCULAR; INTRAVENOUS at 13:10

## 2020-09-07 RX ADMIN — NYSTATIN CREAM SCH: 100000 CREAM TOPICAL at 12:05

## 2020-09-07 RX ADMIN — CEFTRIAXONE SCH MLS/HR: 1 INJECTION, SOLUTION INTRAVENOUS at 17:08

## 2020-09-07 RX ADMIN — ENOXAPARIN SODIUM SCH MG: 120 INJECTION SUBCUTANEOUS at 21:15

## 2020-09-07 RX ADMIN — INSULIN HUMAN SCH UNIT: 100 INJECTION, SOLUTION PARENTERAL at 17:06

## 2020-09-07 RX ADMIN — AZITHROMYCIN MONOHYDRATE SCH MLS/HR: 500 INJECTION, POWDER, LYOPHILIZED, FOR SOLUTION INTRAVENOUS at 22:33

## 2020-09-07 RX ADMIN — ENOXAPARIN SODIUM SCH MG: 120 INJECTION SUBCUTANEOUS at 10:28

## 2020-09-07 RX ADMIN — NYSTATIN CREAM SCH APPLIC: 100000 CREAM TOPICAL at 17:08

## 2020-09-07 RX ADMIN — Medication PRN MG: at 22:33

## 2020-09-07 RX ADMIN — AMITRIPTYLINE HYDROCHLORIDE SCH MG: 25 TABLET, FILM COATED ORAL at 21:14

## 2020-09-07 RX ADMIN — ARIPIPRAZOLE SCH MG: 5 TABLET ORAL at 21:14

## 2020-09-07 RX ADMIN — DOCUSATE SODIUM SCH: 100 CAPSULE, LIQUID FILLED ORAL at 12:04

## 2020-09-07 RX ADMIN — DIPHENHYDRAMINE HYDROCHLORIDE SCH MG: 50 CAPSULE ORAL at 21:15

## 2020-09-07 RX ADMIN — INSULIN HUMAN SCH UNIT: 100 INJECTION, SOLUTION PARENTERAL at 13:10

## 2020-09-07 RX ADMIN — FAMOTIDINE SCH MG: 20 TABLET, FILM COATED ORAL at 22:41

## 2020-09-07 RX ADMIN — VITAMIN D, TAB 1000IU (100/BT) SCH UNIT: 25 TAB at 12:05

## 2020-09-07 RX ADMIN — Medication SCH ML: at 21:15

## 2020-09-07 RX ADMIN — INSULIN HUMAN SCH UNIT: 100 INJECTION, SOLUTION PARENTERAL at 10:27

## 2020-09-07 RX ADMIN — METOPROLOL SUCCINATE SCH MG: 25 TABLET, EXTENDED RELEASE ORAL at 10:27

## 2020-09-07 RX ADMIN — INSULIN HUMAN SCH UNIT: 100 INJECTION, SOLUTION PARENTERAL at 22:34

## 2020-09-07 RX ADMIN — ACETAMINOPHEN PRN MG: 325 TABLET ORAL at 22:39

## 2020-09-07 RX ADMIN — Medication SCH ML: at 13:11

## 2020-09-07 RX ADMIN — FAMOTIDINE SCH MG: 20 TABLET, FILM COATED ORAL at 10:27

## 2020-09-07 RX ADMIN — DEXAMETHASONE SODIUM PHOSPHATE SCH MG: 4 INJECTION, SOLUTION INTRAMUSCULAR; INTRAVENOUS at 21:14

## 2020-09-07 RX ADMIN — DEXAMETHASONE SODIUM PHOSPHATE SCH MG: 4 INJECTION, SOLUTION INTRAMUSCULAR; INTRAVENOUS at 05:34

## 2020-09-07 RX ADMIN — Medication SCH ML: at 05:34

## 2020-09-07 RX ADMIN — OXYCODONE HYDROCHLORIDE AND ACETAMINOPHEN SCH MG: 500 TABLET ORAL at 10:28

## 2020-09-07 RX ADMIN — METOPROLOL SUCCINATE SCH MG: 25 TABLET, EXTENDED RELEASE ORAL at 21:14

## 2020-09-07 RX ADMIN — LISINOPRIL SCH MG: 5 TABLET ORAL at 10:27

## 2020-09-07 RX ADMIN — PREGABALIN SCH MG: 75 CAPSULE ORAL at 21:14

## 2020-09-07 RX ADMIN — PREGABALIN SCH MG: 75 CAPSULE ORAL at 10:28

## 2020-09-07 RX ADMIN — REMDESIVIR SCH MLS/HR: 100 INJECTION, POWDER, LYOPHILIZED, FOR SOLUTION INTRAVENOUS at 21:15

## 2020-09-07 RX ADMIN — VENLAFAXINE HYDROCHLORIDE SCH MG: 75 CAPSULE, EXTENDED RELEASE ORAL at 10:00

## 2020-09-07 RX ADMIN — INSULIN GLARGINE SCH UNIT: 100 INJECTION, SOLUTION SUBCUTANEOUS at 22:34

## 2020-09-07 NOTE — PDOC PROGRESS REPORT
Subjective


Progress Note for:: 09/07/20


Reason For Visit: 


COMMUNITY ACQUIRED BILATERAL MULTIFOCAL PNEUMONIA,








Physical Exam


Vital Signs: 


                                        











Temp Pulse Resp BP Pulse Ox


 


 98.5 F   57 L  15   128/43 H  93 


 


 09/07/20 21:32  09/07/20 21:32  09/07/20 16:47  09/07/20 21:32  09/07/20 21:32








                                 Intake & Output











 09/06/20 09/07/20 09/08/20





 06:59 06:59 06:59


 


Intake Total 1040 2204 780


 


Balance 1040 2204 780


 


Weight 107.7 kg 106.1 kg 














Results


Laboratory Results: 


                                        





                                 09/07/20 09:31 





                                 09/06/20 04:39 





                                        











  09/07/20





  09:31


 


WBC  6.0


 


RBC  4.24


 


Hgb  12.8


 


Hct  39.1


 


MCV  92


 


MCH  30.2


 


MCHC  32.7


 


RDW  15.0 H


 


Plt Count  439








                                        











  09/01/20





  14:47


 


Troponin I  < 0.012











Impressions: 


                                        





Chest X-Ray  09/01/20 15:25


IMPRESSION:  HEART ENLARGED WITHOUT FAILURE.  NO OTHER SIGNIFICANT RADIOGRAPHIC 

FINDING IN THE CHEST.


 








Chest/Abdomen CTA  09/01/20 16:59


IMPRESSION:


 


1.  No central pulmonary embolism.


2.   Commonly reported imaging features of (COVID-19 or viral)


pneumonia are present. Other processes such as influenza


pneumonia and organizing pneumonia, as can be seen with drug


toxicity and connective tissue disease, can cause a similar


imaging pattern. [PneTyp] 


 














Assessment and Plan





- Diagnosis


(1) Pneumonia due to SARS-associated coronavirus


Is this a current diagnosis for this admission?: Yes   


Plan: 


actually beginning to feel better but still not ready for home








(2) Acute respiratory failure with hypoxia


Is this a current diagnosis for this admission?: Yes   


Plan: 


Still requires 5 LPM NC








(3) Hyponatremia


Is this a current diagnosis for this admission?: Yes   


Plan: 


re-check chemistries in AM








(4) Chronic diastolic congestive heart failure


Is this a current diagnosis for this admission?: Yes   


Plan: 


stable. No changes








(5) Fever


Qualifiers: 


   Fever type: unspecified   Qualified Code(s): R50.9 - Fever, unspecified   


Is this a current diagnosis for this admission?: Yes   


Plan: 


afebrile since 9/4/20








(6) Paroxysmal atrial fibrillation


Is this a current diagnosis for this admission?: Yes   


Plan: 


heart rate well controlled. Continue current meds








(7) Hyperglycemia due to diabetes mellitus


Is this a current diagnosis for this admission?: Yes   


Plan: 


accuchecks still variable and elevated. Increase lantus








(8) Hypertension


Qualifiers: 


   Hypertension type: essential hypertension   Qualified Code(s): I10 - 

Essential (primary) hypertension   


Is this a current diagnosis for this admission?: Yes   


Plan: 


Good blood pressure control. No changes








(9) Morbid obesity with BMI of 40.0-44.9, adult


Is this a current diagnosis for this admission?: Yes   


Plan: 


1 she recovers she should consider diet and exercise.








(10) Schizoaffective disorder


Qualifiers: 


   Schizoaffective disorder type: unspecified   Qualified Code(s): F25.9 - 

Schizoaffective disorder, unspecified   


Is this a current diagnosis for this admission?: Yes   


Plan: 


Now that her daughter has dropped off her medications I have asked pharmacy to 

review and continue medications that we do not have on formulary.








(11) Community acquired pneumonia, bilateral


Is this a current diagnosis for this admission?: Yes   


Plan: 


Her pneumonia is caused by COVID virus











(12) UTI (urinary tract infection)


Qualifiers: 


   Urinary tract infection type: site unspecified   Hematuria presence: without 

hematuria   Qualified Code(s): N39.0 - Urinary tract infection, site not 

specified   


Is this a current diagnosis for this admission?: Yes   


Plan: 


resolved








- Plan Summary


Summary: 


Patient will be admitted to the medical floor where she will receive routine 

supportive and symptomatic cares.  She will be treated with IV antibiotics 

utilizing Rocephin and azithromycin.  She will receive supplemental oxygen via 

nasal cannula in order to maintain an adequate oxygen saturation.  She will 

receive her usual inhaler medications for her chronic COPD/asthma.  She will 

receive IV dexamethasone 10 mg initially followed by 6 mg/day.  She will receive

 IV fluids with lactated Ringer's solution at 250 mL/h initially.  She will 

receive morphine sulfate 2 to 4 mg IV every 2 hours as needed for pain.  She 

will receive Ativan 1 mg IV every 4 hours as needed for anxiety or restlessness.

  Before meals and at bedtime Accu-Cheks will be performed with sliding scale 

insulin for hyperglycemia and a hypoglycemic protocol in place.  Patient's usual

 home medications will be resumed, as appropriate, as soon as her medication 

list has been verified and reconciled.  She will receive a diabetic, cardiac and

 prerenal restricted diet.





- Time


Time Spent with patient: 15-24 minutes


Medications reviewed and adjusted accordingly: Yes


Anticipated Discharge Disposition: Home with Home Health


Anticipated Discharge Timeframe: unknown

## 2020-09-08 LAB
ADD MANUAL DIFF: NO
ANION GAP SERPL CALC-SCNC: 8 MMOL/L (ref 5–19)
APPEARANCE UR: CLEAR
APTT PPP: YELLOW S
BASOPHILS # BLD AUTO: 0 10^3/UL (ref 0–0.2)
BASOPHILS NFR BLD AUTO: 0.4 % (ref 0–2)
BILIRUB UR QL STRIP: NEGATIVE
BUN SERPL-MCNC: 18 MG/DL (ref 7–20)
CALCIUM: 8.9 MG/DL (ref 8.4–10.2)
CHLORIDE SERPL-SCNC: 93 MMOL/L (ref 98–107)
CO2 SERPL-SCNC: 34 MMOL/L (ref 22–30)
EOSINOPHIL # BLD AUTO: 0 10^3/UL (ref 0–0.6)
EOSINOPHIL NFR BLD AUTO: 0.5 % (ref 0–6)
ERYTHROCYTE [DISTWIDTH] IN BLOOD BY AUTOMATED COUNT: 14.8 % (ref 11.5–14)
GLUCOSE SERPL-MCNC: 276 MG/DL (ref 75–110)
GLUCOSE UR STRIP-MCNC: NEGATIVE MG/DL
HCT VFR BLD CALC: 39.9 % (ref 36–47)
HGB BLD-MCNC: 13.2 G/DL (ref 12–15.5)
KETONES UR STRIP-MCNC: NEGATIVE MG/DL
LYMPHOCYTES # BLD AUTO: 1.9 10^3/UL (ref 0.5–4.7)
LYMPHOCYTES NFR BLD AUTO: 26.6 % (ref 13–45)
MCH RBC QN AUTO: 30.3 PG (ref 27–33.4)
MCHC RBC AUTO-ENTMCNC: 33.2 G/DL (ref 32–36)
MCV RBC AUTO: 91 FL (ref 80–97)
MONOCYTES # BLD AUTO: 0.6 10^3/UL (ref 0.1–1.4)
MONOCYTES NFR BLD AUTO: 7.9 % (ref 3–13)
NEUTROPHILS # BLD AUTO: 4.7 10^3/UL (ref 1.7–8.2)
NEUTS SEG NFR BLD AUTO: 64.6 % (ref 42–78)
NITRITE UR QL STRIP: NEGATIVE
PH UR STRIP: 5 [PH] (ref 5–9)
PLATELET # BLD: 475 10^3/UL (ref 150–450)
POTASSIUM SERPL-SCNC: 4.1 MMOL/L (ref 3.6–5)
PROT UR STRIP-MCNC: 30 MG/DL
RBC # BLD AUTO: 4.36 10^6/UL (ref 3.72–5.28)
SP GR UR STRIP: 1.03
TOTAL CELLS COUNTED % (AUTO): 100 %
UROBILINOGEN UR-MCNC: NEGATIVE MG/DL (ref ?–2)
WBC # BLD AUTO: 7.2 10^3/UL (ref 4–10.5)

## 2020-09-08 RX ADMIN — DEXAMETHASONE SODIUM PHOSPHATE SCH MG: 4 INJECTION, SOLUTION INTRAMUSCULAR; INTRAVENOUS at 13:36

## 2020-09-08 RX ADMIN — Medication SCH ML: at 05:25

## 2020-09-08 RX ADMIN — DEXAMETHASONE SODIUM PHOSPHATE SCH MG: 4 INJECTION, SOLUTION INTRAMUSCULAR; INTRAVENOUS at 05:24

## 2020-09-08 RX ADMIN — LISINOPRIL SCH: 5 TABLET ORAL at 10:30

## 2020-09-08 RX ADMIN — INSULIN HUMAN SCH UNIT: 100 INJECTION, SOLUTION PARENTERAL at 13:37

## 2020-09-08 RX ADMIN — METOPROLOL SUCCINATE SCH MG: 50 TABLET, EXTENDED RELEASE ORAL at 17:12

## 2020-09-08 RX ADMIN — DIPHENHYDRAMINE HYDROCHLORIDE SCH MG: 50 CAPSULE ORAL at 21:29

## 2020-09-08 RX ADMIN — ZOLPIDEM TARTRATE SCH MG: 5 TABLET ORAL at 21:25

## 2020-09-08 RX ADMIN — INSULIN GLARGINE SCH UNIT: 100 INJECTION, SOLUTION SUBCUTANEOUS at 22:03

## 2020-09-08 RX ADMIN — APIXABAN SCH MG: 5 TABLET, FILM COATED ORAL at 21:24

## 2020-09-08 RX ADMIN — FAMOTIDINE SCH MG: 20 TABLET, FILM COATED ORAL at 10:38

## 2020-09-08 RX ADMIN — DOCUSATE SODIUM SCH MG: 100 CAPSULE, LIQUID FILLED ORAL at 10:40

## 2020-09-08 RX ADMIN — TOLTERODINE TARTRATE SCH MG: 1 TABLET, FILM COATED ORAL at 21:25

## 2020-09-08 RX ADMIN — FAMOTIDINE SCH MG: 20 TABLET, FILM COATED ORAL at 21:25

## 2020-09-08 RX ADMIN — ARIPIPRAZOLE SCH MG: 5 TABLET ORAL at 21:25

## 2020-09-08 RX ADMIN — PREGABALIN SCH MG: 75 CAPSULE ORAL at 10:38

## 2020-09-08 RX ADMIN — VITAMIN D, TAB 1000IU (100/BT) SCH UNIT: 25 TAB at 10:38

## 2020-09-08 RX ADMIN — Medication SCH ML: at 13:38

## 2020-09-08 RX ADMIN — INSULIN HUMAN SCH UNIT: 100 INJECTION, SOLUTION PARENTERAL at 17:10

## 2020-09-08 RX ADMIN — FLUTICASONE PROPIONATE SCH PUFF: 110 AEROSOL, METERED RESPIRATORY (INHALATION) at 21:30

## 2020-09-08 RX ADMIN — DEXAMETHASONE SODIUM PHOSPHATE SCH MG: 4 INJECTION, SOLUTION INTRAMUSCULAR; INTRAVENOUS at 21:24

## 2020-09-08 RX ADMIN — NYSTATIN CREAM SCH APPLIC: 100000 CREAM TOPICAL at 17:12

## 2020-09-08 RX ADMIN — NYSTATIN CREAM SCH APPLIC: 100000 CREAM TOPICAL at 10:41

## 2020-09-08 RX ADMIN — OXYCODONE HYDROCHLORIDE AND ACETAMINOPHEN SCH MG: 500 TABLET ORAL at 17:11

## 2020-09-08 RX ADMIN — METOPROLOL SUCCINATE SCH MG: 50 TABLET, EXTENDED RELEASE ORAL at 21:31

## 2020-09-08 RX ADMIN — Medication PRN MG: at 21:46

## 2020-09-08 RX ADMIN — PREGABALIN SCH MG: 75 CAPSULE ORAL at 21:24

## 2020-09-08 RX ADMIN — VENLAFAXINE HYDROCHLORIDE SCH MG: 75 CAPSULE, EXTENDED RELEASE ORAL at 10:38

## 2020-09-08 RX ADMIN — METOPROLOL SUCCINATE SCH: 25 TABLET, EXTENDED RELEASE ORAL at 10:31

## 2020-09-08 RX ADMIN — ENOXAPARIN SODIUM SCH MG: 120 INJECTION SUBCUTANEOUS at 10:39

## 2020-09-08 RX ADMIN — INSULIN HUMAN SCH UNIT: 100 INJECTION, SOLUTION PARENTERAL at 22:03

## 2020-09-08 RX ADMIN — Medication SCH ML: at 21:30

## 2020-09-08 RX ADMIN — DOXAZOSIN SCH MG: 2 TABLET ORAL at 21:24

## 2020-09-08 RX ADMIN — FLUTICASONE PROPIONATE SCH PUFF: 110 AEROSOL, METERED RESPIRATORY (INHALATION) at 10:44

## 2020-09-08 RX ADMIN — OXYCODONE HYDROCHLORIDE AND ACETAMINOPHEN SCH MG: 500 TABLET ORAL at 10:38

## 2020-09-08 RX ADMIN — FUROSEMIDE SCH MG: 10 INJECTION, SOLUTION INTRAMUSCULAR; INTRAVENOUS at 10:40

## 2020-09-08 RX ADMIN — INSULIN HUMAN SCH UNIT: 100 INJECTION, SOLUTION PARENTERAL at 10:40

## 2020-09-08 NOTE — PDOC PROGRESS REPORT
Subjective


Progress Note for:: 09/08/20


Subjective:: 


Feeling well overall, although she remains hypoxic requiring 5-6 L O2 via NC. 


Reason For Visit: 


COVID 19 PNEUMONIA





Physical Exam


Vital Signs: 


                                        











Temp Pulse Resp BP Pulse Ox


 


 97.8 F   55 L  19   109/52 L  95 


 


 09/08/20 08:40  09/08/20 08:40  09/08/20 08:40  09/08/20 08:40  09/08/20 08:40








                                 Intake & Output











 09/07/20 09/08/20 09/09/20





 06:59 06:59 06:59


 


Intake Total 2204 1500 


 


Balance 2204 1500 


 


Weight 106.1 kg 105.8 kg 











General appearance: PRESENT: no acute distress, morbidly obese


Eye exam: PRESENT: EOMI


Mouth exam: PRESENT: neck supple


Throat exam: ABSENT: post pharyngeal erythema


Neck exam: ABSENT: JVD


Respiratory exam: PRESENT: rhonchi.  ABSENT: accessory muscle use, crackles, 

tachypnea


Cardiovascular exam: PRESENT: irregular rhythm.  ABSENT: tachycardia


GI/Abdominal exam: PRESENT: normal bowel sounds.  ABSENT: tenderness


Rectal exam: PRESENT: deferred


Extremities exam: ABSENT: calf tenderness, pedal edema


Neurological exam: PRESENT: alert


Psychiatric exam: PRESENT: flat affect





Results


Laboratory Results: 


                                        





                                 09/08/20 05:45 





                                 09/08/20 05:45 





                                        











  09/08/20 09/08/20 09/08/20





  05:29 05:45 05:45


 


WBC   7.2 


 


RBC   4.36 


 


Hgb   13.2 


 


Hct   39.9 


 


MCV   91 


 


MCH   30.3 


 


MCHC   33.2 


 


RDW   14.8 H 


 


Plt Count   475 H 


 


Seg Neutrophils %   64.6 


 


Sodium    134.6 L


 


Potassium    4.1


 


Chloride    93 L


 


Carbon Dioxide    34 H


 


Anion Gap    8


 


BUN    18


 


Creatinine    0.81


 


Est GFR ( Amer)    > 60


 


Glucose    276 H


 


Calcium    8.9


 


Magnesium    2.0


 


Urine Color  YELLOW  


 


Urine Appearance  CLEAR  


 


Urine pH  5.0  


 


Ur Specific Gravity  1.028  


 


Urine Protein  30 H  


 


Urine Glucose (UA)  NEGATIVE  


 


Urine Ketones  NEGATIVE  


 


Urine Blood  NEGATIVE  


 


Urine Nitrite  NEGATIVE  


 


Ur Leukocyte Esterase  TRACE H  


 


Urine WBC (Auto)  3  


 


Urine RBC (Auto)  4  








                                        











  09/01/20





  14:47


 


Troponin I  < 0.012











Impressions: 


                                        





Chest X-Ray  09/01/20 15:25


IMPRESSION:  HEART ENLARGED WITHOUT FAILURE.  NO OTHER SIGNIFICANT RADIOGRAPHIC 

FINDING IN THE CHEST.


 








Chest/Abdomen CTA  09/01/20 16:59


IMPRESSION:


 


1.  No central pulmonary embolism.


2.   Commonly reported imaging features of (COVID-19 or viral)


pneumonia are present. Other processes such as influenza


pneumonia and organizing pneumonia, as can be seen with drug


toxicity and connective tissue disease, can cause a similar


imaging pattern. [PneTyp] 


 














Assessment and Plan





- Plan Summary


Summary: 


COVID 19 Pneumonia 


Acute respiratory failure with hypoxia


- Still requires 5-6 O2 via NC


- continue dexamethasone 


- DC antibiotics 


- afebrile since 9/4/20





Acute on Chronic diastolic congestive heart failure


Hyponatremia: mild, may have been due to volume overload on admission, and may 

now be due to over-diuresis at this point 


- DC Lasix 


- will re-check BMP in AM





Paroxysmal atrial fibrillation


- heart rate well controlled


- continue home Eliquis and metoprolol 





Hyperglycemia due to diabetes mellitus type 2


- accuchecks still variable and elevated





Morbid obesity with BMI of 40.0-44.9, adult


 - encourage diet and exercise, may benefit from bariatric surgery referral in 

future 





Schizoaffective disorder


- restart home medications 





Acute lower UTI (urinary tract infection) without hematuria 


- resolved with IV antibiotics 





- Time


Time Spent with patient: 35 or more minutes


Medications reviewed and adjusted accordingly: Yes


Anticipated Discharge Disposition: Home, Self Care


Anticipated Discharge Timeframe: within 72 hours

## 2020-09-09 LAB
ANION GAP SERPL CALC-SCNC: 7 MMOL/L (ref 5–19)
BUN SERPL-MCNC: 19 MG/DL (ref 7–20)
CALCIUM: 8.4 MG/DL (ref 8.4–10.2)
CHLORIDE SERPL-SCNC: 89 MMOL/L (ref 98–107)
CO2 SERPL-SCNC: 36 MMOL/L (ref 22–30)
ERYTHROCYTE [DISTWIDTH] IN BLOOD BY AUTOMATED COUNT: 14.7 % (ref 11.5–14)
GLUCOSE SERPL-MCNC: 317 MG/DL (ref 75–110)
HCT VFR BLD CALC: 38.8 % (ref 36–47)
HGB BLD-MCNC: 12.8 G/DL (ref 12–15.5)
MCH RBC QN AUTO: 30.4 PG (ref 27–33.4)
MCHC RBC AUTO-ENTMCNC: 33.1 G/DL (ref 32–36)
MCV RBC AUTO: 92 FL (ref 80–97)
PLATELET # BLD: 425 10^3/UL (ref 150–450)
POTASSIUM SERPL-SCNC: 4 MMOL/L (ref 3.6–5)
RBC # BLD AUTO: 4.22 10^6/UL (ref 3.72–5.28)
WBC # BLD AUTO: 5.9 10^3/UL (ref 4–10.5)

## 2020-09-09 RX ADMIN — DIGOXIN SCH MG: 0.25 INJECTION INTRAMUSCULAR; INTRAVENOUS at 21:51

## 2020-09-09 RX ADMIN — METOPROLOL SUCCINATE SCH MG: 50 TABLET, EXTENDED RELEASE ORAL at 21:54

## 2020-09-09 RX ADMIN — METOPROLOL TARTRATE SCH: 5 INJECTION, SOLUTION INTRAVENOUS at 21:52

## 2020-09-09 RX ADMIN — FLUTICASONE PROPIONATE SCH PUFF: 110 AEROSOL, METERED RESPIRATORY (INHALATION) at 10:05

## 2020-09-09 RX ADMIN — DOXAZOSIN SCH MG: 2 TABLET ORAL at 22:00

## 2020-09-09 RX ADMIN — FAMOTIDINE SCH MG: 20 TABLET, FILM COATED ORAL at 10:01

## 2020-09-09 RX ADMIN — NYSTATIN CREAM SCH APPLIC: 100000 CREAM TOPICAL at 10:04

## 2020-09-09 RX ADMIN — Medication PRN MG: at 23:31

## 2020-09-09 RX ADMIN — APIXABAN SCH MG: 5 TABLET, FILM COATED ORAL at 21:54

## 2020-09-09 RX ADMIN — Medication SCH: at 14:12

## 2020-09-09 RX ADMIN — DEXAMETHASONE SODIUM PHOSPHATE SCH MG: 4 INJECTION, SOLUTION INTRAMUSCULAR; INTRAVENOUS at 13:22

## 2020-09-09 RX ADMIN — PREGABALIN SCH MG: 75 CAPSULE ORAL at 10:00

## 2020-09-09 RX ADMIN — INSULIN GLARGINE SCH UNIT: 100 INJECTION, SOLUTION SUBCUTANEOUS at 21:58

## 2020-09-09 RX ADMIN — LISINOPRIL SCH MG: 5 TABLET ORAL at 10:01

## 2020-09-09 RX ADMIN — METOPROLOL SUCCINATE SCH MG: 50 TABLET, EXTENDED RELEASE ORAL at 10:03

## 2020-09-09 RX ADMIN — FENOFIBRATE SCH MG: 145 TABLET ORAL at 08:18

## 2020-09-09 RX ADMIN — TOLTERODINE TARTRATE SCH MG: 1 TABLET, FILM COATED ORAL at 21:53

## 2020-09-09 RX ADMIN — NYSTATIN CREAM SCH APPLIC: 100000 CREAM TOPICAL at 17:53

## 2020-09-09 RX ADMIN — Medication SCH ML: at 05:07

## 2020-09-09 RX ADMIN — OXYCODONE HYDROCHLORIDE AND ACETAMINOPHEN SCH MG: 500 TABLET ORAL at 10:01

## 2020-09-09 RX ADMIN — TOLTERODINE TARTRATE SCH MG: 1 TABLET, FILM COATED ORAL at 10:01

## 2020-09-09 RX ADMIN — METOPROLOL TARTRATE SCH MG: 5 INJECTION, SOLUTION INTRAVENOUS at 14:28

## 2020-09-09 RX ADMIN — ZOLPIDEM TARTRATE SCH MG: 5 TABLET ORAL at 21:54

## 2020-09-09 RX ADMIN — PREGABALIN SCH MG: 75 CAPSULE ORAL at 21:52

## 2020-09-09 RX ADMIN — ARIPIPRAZOLE SCH MG: 5 TABLET ORAL at 21:54

## 2020-09-09 RX ADMIN — INSULIN HUMAN SCH UNIT: 100 INJECTION, SOLUTION PARENTERAL at 16:13

## 2020-09-09 RX ADMIN — VENLAFAXINE HYDROCHLORIDE SCH MG: 75 CAPSULE, EXTENDED RELEASE ORAL at 10:00

## 2020-09-09 RX ADMIN — APIXABAN SCH MG: 5 TABLET, FILM COATED ORAL at 10:01

## 2020-09-09 RX ADMIN — DIGOXIN SCH MG: 0.25 INJECTION INTRAMUSCULAR; INTRAVENOUS at 16:13

## 2020-09-09 RX ADMIN — DEXAMETHASONE SODIUM PHOSPHATE SCH MG: 4 INJECTION, SOLUTION INTRAMUSCULAR; INTRAVENOUS at 05:06

## 2020-09-09 RX ADMIN — DIPHENHYDRAMINE HYDROCHLORIDE SCH MG: 50 CAPSULE ORAL at 22:00

## 2020-09-09 RX ADMIN — DEXAMETHASONE SODIUM PHOSPHATE SCH MG: 4 INJECTION, SOLUTION INTRAMUSCULAR; INTRAVENOUS at 21:52

## 2020-09-09 RX ADMIN — INSULIN HUMAN SCH UNIT: 100 INJECTION, SOLUTION PARENTERAL at 08:18

## 2020-09-09 RX ADMIN — METOPROLOL TARTRATE SCH MG: 5 INJECTION, SOLUTION INTRAVENOUS at 17:23

## 2020-09-09 RX ADMIN — INSULIN HUMAN SCH UNIT: 100 INJECTION, SOLUTION PARENTERAL at 11:31

## 2020-09-09 RX ADMIN — OXYCODONE HYDROCHLORIDE AND ACETAMINOPHEN SCH MG: 500 TABLET ORAL at 17:24

## 2020-09-09 RX ADMIN — INSULIN HUMAN SCH UNIT: 100 INJECTION, SOLUTION PARENTERAL at 22:04

## 2020-09-09 RX ADMIN — VITAMIN D, TAB 1000IU (100/BT) SCH UNIT: 25 TAB at 10:02

## 2020-09-09 RX ADMIN — FAMOTIDINE SCH MG: 20 TABLET, FILM COATED ORAL at 21:54

## 2020-09-09 NOTE — EKG REPORT
SEVERITY:- ABNORMAL ECG -

A-FLUTTER W/ VARIED AV BLOCK, A-RATE 333

:

Confirmed by: Jacob Melendez MD 09-Sep-2020 09:41:15

## 2020-09-09 NOTE — PDOC CONSULTATION
Consultation


Consult Date: 09/09/20


Provider Consulted: CORAL SALAZAR


Consult reason:: Atrial flutter





History of Present Illness


Admission Date/PCP: 


  09/01/20 21:43





  RIKI ALAS DO





Patient complains of: Fatigue


History of Present Illness: 


EVERARDO GEORGE is a 55 year old female


55-year-old lady who has been admitted with COVID pneumonia.





Prior problems include paroxysmal atrial fibrillation, congestive heart failure,

dyslipidemia, hypertension.  Patient is on long-term systemic anticoagulation.  

Cardiology input has been requested on account of atrial flutter with difficult 

rate control.  At the time of my evaluation patient reports no symptoms and 

feels significantly better from when she was admitted.  She does not report 

chest pain, dyspnea, palpitations or fatigue.





Review of systems was obtained and is negative as were mentioned in the HPI.





No familial illnesses reported to me back.








Past Medical History


Cardiac Medical History: Reports: Atrial Fibrillation, Congestive Heart Failure,

Hyperlipidema, Hypertension


   Denies: Coronary Artery Disease, DVT, Myocardial Infarction, Pulmonary 

Embolism


Pulmonary Medical History: Reports: Asthma, Bronchitis


   Denies: Chronic Obstructive Pulmonary Disease (COPD), Pneumonia


EENT Medical History: 


   Denies: Cataracts, Ears - Hearing aids


Neurological Medical History: Reports: Migraine


   Denies: Hemorrhagic CVA, Ischemic CVA, Seizures


Endocrine Medical History: Reports: Diabetes Mellitus Type 2 - Poorly compliant 

with medications and diet, Hypothyroidism


   Denies: Diabetes Mellitus Type 1, Hyperthyroidism


Renal/ Medical History: 


   Denies: Chronic Kidney Disease, Nephrolithiasis


Malignancy Medical History: Reports: None


GI Medical History: Reports: Gastroesophageal Reflux Disease


   Denies: Cirrhosis, Crohn's Disease, Hepatitis, Peptic Ulcer Disease, 

Ulcerative Colitis


Musculoskeltal Medical History: Reports: Arthritis


   Denies: Gout


Skin Medical History: 


   Denies: Eczema, Psoriasis


Psychiatric Medical History: Reports: Schizoaffective Disorder


   Denies: Alcohol Dependency, Depression, Substance Abuse, Tobacco Dependency


Traumatic Medical History: Reports: None


Hematology: 


   Denies: Anemia, Bleeding Tendencies


Infectious Medical History: Reports: None





Past Surgical History


Past Surgical History: Reports: Cholecystectomy, Tubal Ligation


   Denies: Hysterectomy





Social History


Lives with: Alone


Smoking Status: Former Smoker


Electronic Cigarette use?: No


Frequency of Alcohol Use: None


Hx Recreational Drug Use: No


Drugs: None


Hx Prescription Drug Abuse: No





- Advance Directive


Resuscitation Status: Full Code





Family History


Family History: CAD, CVA, DM, Hyperlipidemia, Hypertension, Malignancy, Thyroid 

Disfunction, Other - Asthma


Parental Family History Reviewed: Yes - No familial illnesses


Children Family History Reviewed: NA


Sibling(s) Family History Reviewed.: NA





Medication/Allergy


Home Medications: 








Apixaban [Eliquis 5 mg Tablet] 5 mg PO Q12 12/20/17 


Zolpidem Tartrate [Ambien] 10 mg PO QHS 12/20/17 


Brexpiprazole [Rexulti] 2 mg PO QHS 11/16/18 


Solifenacin Succinate [Vesicare] 10 mg PO DAILY 11/16/18 


Oxycodone HCl/Acetaminophen [Percocet 5-325 mg Tablet] 1 tab PO Q8HP PRN 

03/22/20 


Prazosin HCl [Minipress] 2 mg PO QHS 03/22/20 


Tizanidine HCl 6 mg PO Q8 03/22/20 


Metoprolol Succinate [Toprol Xl 25 mg Tab.sr] 50 mg PO Q12 30 Days 03/31/20 


Lisinopril [Prinivil 5 mg Tablet] 5 mg PO DAILY 06/21/20 


Pregabalin [Lyrica] 225 mg PO Q12 06/21/20 


Alprazolam [Xanax] 1 mg PO TID 09/02/20 


Amitriptyline HCl [Elavil 25 mg Tablet] 50 mg PO QHS 09/02/20 


Desvenlafaxine [Desvenlafaxine ER] 100 mg PO DAILY 09/02/20 


Empagliflozin/Linagliptin [Glyxambi 25 mg-5 mg Tablet] 1 each PO QAM 09/02/20 


Fenofibrate Nanocrystallized [Fenofibrate] 145 mg PO DAILY 09/02/20 


Flovent Hfa 220mcg 1 puff IH BID 09/02/20 


Glipizide [Glipizide Xl] 10 mg PO DAILY 09/02/20 


Metoprolol Succinate [Toprol Xl 50 mg Tab.sr] 50 mg PO BID 09/02/20 


Moxifloxacin HCl [Vigamox 0.5% Oph Soln 3 ml] 1 drop OP TID 09/02/20 


Mupirocin [Bactroban 2% Ointment 22 gm] 1 applic NASL BID 09/02/20 








Allergies/Adverse Reactions: 


                                        





tramadol Allergy (Intermediate, Verified 09/01/20 15:10)


   AMS


Penicillins Allergy (Mild, Verified 09/01/20 15:10)


   rash


Sulfa (Sulfonamide Antibiotics) Allergy (Mild, Verified 09/01/20 15:10)


   rash


aspirin Allergy (Verified 09/01/20 15:10)


   NO ASA











Review of Systems


All systems: reviewed and no additional remarkable complaints except as stated -

Fatigue


Constitutional: PRESENT: as per HPI


Eyes: PRESENT: as per HPI


Ears: PRESENT: as per HPI


Nose, Mouth, and Throat: PRESENT: as per HPI


Breasts: PRESENT: as per HPI


Cardiovascular: PRESENT: as per HPI


Respiratory: PRESENT: as per HPI, dyspnea





Physical Exam


Vital Signs: 


                                        











Temp Pulse Resp BP Pulse Ox


 


 97.6 F   86   16   90/62 L  99 


 


 09/09/20 08:38  09/09/20 11:14  09/09/20 11:14  09/09/20 11:14  09/09/20 11:14








                                 Intake & Output











 09/08/20 09/09/20 09/10/20





 06:59 06:59 06:59


 


Intake Total 1500 720 


 


Balance 1500 720 


 


Weight 105.8 kg 105.8 kg 











General appearance: PRESENT: no acute distress, cooperative, obese, well-

developed, well-nourished


Head exam: PRESENT: atraumatic, normocephalic


Eye exam: PRESENT: conjunctiva pink, EOMI


Mouth exam: PRESENT: moist


Respiratory exam: PRESENT: crackles, decreased breath sounds, symmetrical, 

unlabored


Cardiovascular exam: PRESENT: irregular rhythm, +S1, +S2


Pulses: PRESENT: normal radial pulses


GI/Abdominal exam: PRESENT: soft


Rectal exam: PRESENT: deferred


Neurological exam: PRESENT: alert, awake, oriented to person, oriented to place,

oriented to time, oriented to situation


Psychiatric exam: PRESENT: appropriate affect


Skin exam: PRESENT: dry, intact, normal color





Results


Laboratory Results: 


                                        





                                 09/09/20 04:29 





                                 09/09/20 04:29 





                                        











  09/09/20 09/09/20





  04:29 04:29


 


WBC   5.9


 


RBC   4.22


 


Hgb   12.8


 


Hct   38.8


 


MCV   92


 


MCH   30.4


 


MCHC   33.1


 


RDW   14.7 H


 


Plt Count   425


 


Sodium  131.7 L 


 


Potassium  4.0 


 


Chloride  89 L 


 


Carbon Dioxide  36 H 


 


Anion Gap  7 


 


BUN  19 


 


Creatinine  0.73 


 


Est GFR (African Amer)  > 60 


 


Glucose  317 H 


 


Calcium  8.4 








                                        











  09/01/20





  14:47


 


Troponin I  < 0.012











EKG Comments: 





Transthoracic echocardiogram 1/30/2018


Left ventricular ejection fraction is preserved.


No mitral stenosis no mitral regurgitation


Mild aortic valve sclerosis with no aortic stenosis.





Twelve-lead EKG 9/1/2020


Sinus rhythm, 81 bpm





Twelve-lead EKG 9/9/2020


Probable atrial flutter with variable ventricular conduction.  Ventricular rate 

is 151 bpm





COVID-19 detected








9/1/2020 chest abdomen CTA


No central pulmonary embolus


Features of COVID-19 pneumonia


Impressions: 


                                        





Chest X-Ray  09/01/20 15:25


IMPRESSION:  HEART ENLARGED WITHOUT FAILURE.  NO OTHER SIGNIFICANT RADIOGRAPHIC 

FINDING IN THE CHEST.


 








Chest/Abdomen CTA  09/01/20 16:59


IMPRESSION:


 


1.  No central pulmonary embolism.


2.   Commonly reported imaging features of (COVID-19 or viral)


pneumonia are present. Other processes such as influenza


pneumonia and organizing pneumonia, as can be seen with drug


toxicity and connective tissue disease, can cause a similar


imaging pattern. [PneTyp] 


 














Assessment & Plan





- Diagnosis


(1) Community acquired pneumonia, bilateral


Is this a current diagnosis for this admission?: Yes   


Plan: 


Supportive care








(2) Atrial fibrillation with RVR


Is this a current diagnosis for this admission?: Yes   


Plan: 


Patient is known to have paroxysmal atrial fibrillation.


In the context of ongoing viral pneumonia her rate is much increased and is been

 difficult to control


Would prefer the use of intravenous beta-blocker in small aliquots to decrease 

the ventricular rate since her baseline blood pressure is low normal.


If in a bind since patient is fully anticoagulated it will not be unreasonable 

to use intravenous amiodarone for short duration.


Another option would be to use intravenous digoxin to slow down the ventricular 

rate


Would not be wise to pursue aggressive measures to restore normal rhythm with 

ongoing COVID-19 viral pneumonic process.





Continue systemic anticoagulation without interruption

## 2020-09-09 NOTE — PDOC PROGRESS REPORT
Subjective


Progress Note for:: 09/09/20


Subjective:: 


This morning, she developed atrial flutter with heart rate in 150s. She remains 

asymptomatic and is feeling actually well overall. 


Reason For Visit: 


COMMUNITY ACQUIRED BILATERAL MULTIFOCAL PNEUMONIA, ATRIAL FLUTTER





Physical Exam


Vital Signs: 


                                        











Temp Pulse Resp BP Pulse Ox


 


 97.7 F   117 H  18   107/52 L  96 


 


 09/09/20 13:47  09/09/20 13:47  09/09/20 13:47  09/09/20 13:47  09/09/20 13:47








                                 Intake & Output











 09/08/20 09/09/20 09/10/20





 06:59 06:59 06:59


 


Intake Total 1500 720 


 


Balance 1500 720 


 


Weight 105.8 kg 105.8 kg 











Additional comments: 


General: morbidly obese


Head: normocephalic, atraumatic


Eyes: anicteric sclera


ENT: moist mucus membranes, no oropharyngeal erythema/exudate 


Neck: no lymphadenopathy, no JVD 


Lungs: no increased WOB, no crackles 


Heart: tachycardia, regular


Abdomen: normoactive bowel sounds, soft, non-tender, non-distended


: no CVA tenderness, no suprapubic tenderness


Extremities: warm and well perfused, +pedal edema


Neuro: A&O


Skin: no rash





Results


Laboratory Results: 


                                        





                                 09/09/20 04:29 





                                 09/09/20 04:29 





                                        











  09/09/20 09/09/20





  04:29 04:29


 


WBC   5.9


 


RBC   4.22


 


Hgb   12.8


 


Hct   38.8


 


MCV   92


 


MCH   30.4


 


MCHC   33.1


 


RDW   14.7 H


 


Plt Count   425


 


Sodium  131.7 L 


 


Potassium  4.0 


 


Chloride  89 L 


 


Carbon Dioxide  36 H 


 


Anion Gap  7 


 


BUN  19 


 


Creatinine  0.73 


 


Est GFR (African Amer)  > 60 


 


Glucose  317 H 


 


Calcium  8.4 








                                        











  09/01/20





  14:47


 


Troponin I  < 0.012











Impressions: 


                                        





Chest X-Ray  09/01/20 15:25


IMPRESSION:  HEART ENLARGED WITHOUT FAILURE.  NO OTHER SIGNIFICANT RADIOGRAPHIC 

FINDING IN THE CHEST.


 








Chest/Abdomen CTA  09/01/20 16:59


IMPRESSION:


 


1.  No central pulmonary embolism.


2.   Commonly reported imaging features of (COVID-19 or viral)


pneumonia are present. Other processes such as influenza


pneumonia and organizing pneumonia, as can be seen with drug


toxicity and connective tissue disease, can cause a similar


imaging pattern. [PneTyp] 


 














Assessment and Plan





- Plan Summary


Summary: 


COVID 19 Pneumonia 


Acute respiratory failure with hypoxia


- Still requires 5-6 O2 via NC


- continue dexamethasone 


- afebrile since 9/4/20





Atrial Flutter with RVR: new diagnosis, confirmed by EKG 


- cardiology consulted


- can not tolerate amio or dilt due to hypotension 


- continue metoprolol 50 BID and add metoprolol 5 mg IV Q6H


- start digoxin 0.25 mg IV Q6H x4 doses for rate control, and then maintenance 

dose of 0.25 mg PO daily (check daily levels until at steady state)


- give 500 mL NS bolus, recheck VS and if BP is fluid responsive, will give 

additional dose of  mL IV 





Acute on Chronic diastolic congestive heart failure: resolved with diuresis, she

 has now developed contraction alkalosis, HOLD Lasix 


- re-check BMP in AM





Paroxysmal atrial fibrillation


- continue home Eliquis and metoprolol 





Hyperglycemia due to diabetes mellitus type 2


- accuchecks still variable and elevated





Morbid obesity with BMI of 40.0-44.9, adult


 - encourage diet and exercise, may benefit from bariatric surgery referral in 

future 





Schizoaffective disorder


- restart home medications 





Acute lower UTI (urinary tract infection) without hematuria 


- resolved with IV antibiotics 





- Time


Time Spent with patient: 35 or more minutes


Anticipated Discharge Disposition: Home, Self Care


Anticipated Discharge Timeframe: within 72 hours

## 2020-09-10 LAB
ANION GAP SERPL CALC-SCNC: 6 MMOL/L (ref 5–19)
BUN SERPL-MCNC: 20 MG/DL (ref 7–20)
CALCIUM: 8.4 MG/DL (ref 8.4–10.2)
CHLORIDE SERPL-SCNC: 95 MMOL/L (ref 98–107)
CO2 SERPL-SCNC: 33 MMOL/L (ref 22–30)
DIGOXIN SERPL-MCNC: 1.03 NG/ML (ref 0.8–2)
GLUCOSE SERPL-MCNC: 223 MG/DL (ref 75–110)
POTASSIUM SERPL-SCNC: 4 MMOL/L (ref 3.6–5)

## 2020-09-10 RX ADMIN — METOPROLOL SUCCINATE SCH MG: 50 TABLET, EXTENDED RELEASE ORAL at 09:19

## 2020-09-10 RX ADMIN — PREGABALIN SCH MG: 75 CAPSULE ORAL at 21:57

## 2020-09-10 RX ADMIN — Medication SCH: at 13:59

## 2020-09-10 RX ADMIN — FLUTICASONE PROPIONATE SCH PUFF: 110 AEROSOL, METERED RESPIRATORY (INHALATION) at 09:19

## 2020-09-10 RX ADMIN — FLUTICASONE PROPIONATE SCH PUFF: 110 AEROSOL, METERED RESPIRATORY (INHALATION) at 22:08

## 2020-09-10 RX ADMIN — VITAMIN D, TAB 1000IU (100/BT) SCH UNIT: 25 TAB at 09:19

## 2020-09-10 RX ADMIN — METOPROLOL TARTRATE SCH: 5 INJECTION, SOLUTION INTRAVENOUS at 04:38

## 2020-09-10 RX ADMIN — FAMOTIDINE SCH MG: 20 TABLET, FILM COATED ORAL at 21:55

## 2020-09-10 RX ADMIN — INSULIN HUMAN SCH UNIT: 100 INJECTION, SOLUTION PARENTERAL at 17:20

## 2020-09-10 RX ADMIN — PREGABALIN SCH MG: 75 CAPSULE ORAL at 09:19

## 2020-09-10 RX ADMIN — TOLTERODINE TARTRATE SCH MG: 1 TABLET, FILM COATED ORAL at 09:18

## 2020-09-10 RX ADMIN — APIXABAN SCH MG: 5 TABLET, FILM COATED ORAL at 21:59

## 2020-09-10 RX ADMIN — VENLAFAXINE HYDROCHLORIDE SCH MG: 75 CAPSULE, EXTENDED RELEASE ORAL at 09:18

## 2020-09-10 RX ADMIN — INSULIN HUMAN SCH UNIT: 100 INJECTION, SOLUTION PARENTERAL at 13:54

## 2020-09-10 RX ADMIN — Medication SCH: at 22:10

## 2020-09-10 RX ADMIN — TOLTERODINE TARTRATE SCH MG: 1 TABLET, FILM COATED ORAL at 21:54

## 2020-09-10 RX ADMIN — Medication SCH: at 08:15

## 2020-09-10 RX ADMIN — OXYCODONE HYDROCHLORIDE AND ACETAMINOPHEN SCH MG: 500 TABLET ORAL at 09:18

## 2020-09-10 RX ADMIN — APIXABAN SCH MG: 5 TABLET, FILM COATED ORAL at 09:18

## 2020-09-10 RX ADMIN — Medication SCH: at 00:42

## 2020-09-10 RX ADMIN — METOPROLOL SUCCINATE SCH MG: 50 TABLET, EXTENDED RELEASE ORAL at 21:56

## 2020-09-10 RX ADMIN — DEXAMETHASONE SODIUM PHOSPHATE SCH MG: 4 INJECTION, SOLUTION INTRAMUSCULAR; INTRAVENOUS at 13:54

## 2020-09-10 RX ADMIN — ARIPIPRAZOLE SCH MG: 5 TABLET ORAL at 21:56

## 2020-09-10 RX ADMIN — METOPROLOL TARTRATE SCH MG: 5 INJECTION, SOLUTION INTRAVENOUS at 06:25

## 2020-09-10 RX ADMIN — NYSTATIN CREAM SCH APPLIC: 100000 CREAM TOPICAL at 09:19

## 2020-09-10 RX ADMIN — OXYCODONE HYDROCHLORIDE AND ACETAMINOPHEN SCH MG: 500 TABLET ORAL at 17:22

## 2020-09-10 RX ADMIN — DIGOXIN SCH: 0.25 INJECTION INTRAMUSCULAR; INTRAVENOUS at 08:37

## 2020-09-10 RX ADMIN — INSULIN GLARGINE SCH UNIT: 100 INJECTION, SOLUTION SUBCUTANEOUS at 22:10

## 2020-09-10 RX ADMIN — Medication PRN MG: at 22:24

## 2020-09-10 RX ADMIN — FENOFIBRATE SCH MG: 145 TABLET ORAL at 09:18

## 2020-09-10 RX ADMIN — DOXAZOSIN SCH MG: 2 TABLET ORAL at 21:58

## 2020-09-10 RX ADMIN — ZOLPIDEM TARTRATE SCH MG: 5 TABLET ORAL at 21:56

## 2020-09-10 RX ADMIN — DIGOXIN SCH: 0.25 INJECTION INTRAMUSCULAR; INTRAVENOUS at 05:07

## 2020-09-10 RX ADMIN — DEXAMETHASONE SODIUM PHOSPHATE SCH MG: 4 INJECTION, SOLUTION INTRAMUSCULAR; INTRAVENOUS at 21:58

## 2020-09-10 RX ADMIN — DEXAMETHASONE SODIUM PHOSPHATE SCH MG: 4 INJECTION, SOLUTION INTRAMUSCULAR; INTRAVENOUS at 06:24

## 2020-09-10 RX ADMIN — INSULIN HUMAN SCH UNIT: 100 INJECTION, SOLUTION PARENTERAL at 22:10

## 2020-09-10 RX ADMIN — DIPHENHYDRAMINE HYDROCHLORIDE SCH MG: 50 CAPSULE ORAL at 21:57

## 2020-09-10 RX ADMIN — FAMOTIDINE SCH MG: 20 TABLET, FILM COATED ORAL at 09:19

## 2020-09-10 RX ADMIN — FLUTICASONE PROPIONATE SCH: 110 AEROSOL, METERED RESPIRATORY (INHALATION) at 08:16

## 2020-09-10 RX ADMIN — INSULIN HUMAN SCH UNIT: 100 INJECTION, SOLUTION PARENTERAL at 09:18

## 2020-09-10 NOTE — PDOC PROGRESS REPORT
Subjective


Progress Note for:: 09/10/20


Subjective:: 


She feels better overall today. Denies chest pain/pressure, palpitations. HR is 

now controlled. Glucose remains uncontrolled.


Reason For Visit: 


COMMUNITY ACQUIRED BILATERAL MULTIFOCAL PNEUMONIA, ATRIAL FLUTTER





Physical Exam


Vital Signs: 


                                        











Temp Pulse Resp BP Pulse Ox


 


 97.3 F   72   26 H  119/76   94 


 


 09/10/20 08:12  09/10/20 08:12  09/10/20 08:12  09/10/20 08:12  09/10/20 08:12








                                 Intake & Output











 09/09/20 09/10/20 09/11/20





 06:59 06:59 06:59


 


Intake Total 


 


Balance 


 


Weight 105.8 kg  106 kg











General appearance: PRESENT: no acute distress


Mouth exam: PRESENT: dry mucosa


Neck exam: ABSENT: JVD


Respiratory exam: PRESENT: rhonchi, unlabored.  ABSENT: accessory muscle use, 

crackles, wheezes


Cardiovascular exam: PRESENT: irregular rhythm


GI/Abdominal exam: PRESENT: soft.  ABSENT: distended, tenderness


Extremities exam: ABSENT: pedal edema, +1 edema


Neurological exam: PRESENT: alert, awake, oriented to person, oriented to place,

oriented to time, oriented to situation


Psychiatric exam: PRESENT: flat affect


Skin exam: ABSENT: rash





Results


Laboratory Results: 


                                        





                                 09/09/20 04:29 





                                 09/10/20 04:45 





                                        











  09/10/20





  04:45


 


Sodium  134.2 L


 


Potassium  4.0


 


Chloride  95 L


 


Carbon Dioxide  33 H


 


Anion Gap  6


 


BUN  20


 


Creatinine  0.80


 


Est GFR (African Amer)  > 60


 


Glucose  223 H


 


Calcium  8.4








                                        











  09/01/20





  14:47


 


Troponin I  < 0.012











Impressions: 


                                        





Chest X-Ray  09/01/20 15:25


IMPRESSION:  HEART ENLARGED WITHOUT FAILURE.  NO OTHER SIGNIFICANT RADIOGRAPHIC 

FINDING IN THE CHEST.


 








Chest/Abdomen CTA  09/01/20 16:59


IMPRESSION:


 


1.  No central pulmonary embolism.


2.   Commonly reported imaging features of (COVID-19 or viral)


pneumonia are present. Other processes such as influenza


pneumonia and organizing pneumonia, as can be seen with drug


toxicity and connective tissue disease, can cause a similar


imaging pattern. [PneTyp] 


 














Assessment and Plan





- Plan Summary


Summary: 


COVID 19 Pneumonia 


Acute respiratory failure with hypoxia


- Still requires 4-5 LPM O2 via NC


- continue dexamethasone (D9/10) 


- afebrile since 9/4/20





Atrial Flutter with RVR: new diagnosis on 9/09, confirmed by EKG. Resolved with 

addition of digoxin IV and she is back in atrial fibrillation. 


- cardiology consulted


- can not tolerate amio or dilt due to hypotension 


- continue metoprolol 50 mg BID


- telemetry 





Acute on Chronic diastolic congestive heart failure: resolved with diuresis, she

 has now developed contraction alkalosis, HOLD Lasix 


- re-check BMP in AM


- encourage oral intake 





Paroxysmal atrial fibrillation: rate controlled 


- continue home Eliquis and metoprolol 





Hyperglycemia due to diabetes mellitus type 2


- accuchecks still variable and elevated


- continue Lantus 10 U daily 


- start mealtime insulin at 3 U TID AC 


- continue SSI ACHS 





Morbid obesity with BMI of 40.0-44.9, adult


 - encourage diet and exercise, may benefit from bariatric surgery referral in 

future 





Schizoaffective disorder


- continue home medications 





Acute lower UTI (urinary tract infection) without hematuria 


- resolved with IV antibiotics 





- Time


Time Spent with patient: 35 or more minutes


Anticipated Discharge Disposition: Home, Self Care


Anticipated Discharge Timeframe: within 72 hours

## 2020-09-10 NOTE — PROGRESS NOTE
Provider Note


Provider Note: 





Did telemetry reviewed


Atrial fibrillation with controlled ventricular response at the moment


Would not recommend escalation of therapy


Would prefer persisting with beta-blocker


If rapid rate and just use can consider addition of oral digoxin to her 

therapeutic regimen


Continue systemic anticoagulation


I suspect arrhythmia was exacerbated on account of COVID pneumonia and now that 

she is improving this may subside.

## 2020-09-10 NOTE — EKG REPORT
SEVERITY:- ABNORMAL ECG -

ATRIAL FIBRILLATION, V-RATE 

NONSPECIFIC T ABNORMALITIES, LATERAL LEADS

:

Confirmed by: Jacob Melendez MD 10-Sep-2020 12:46:41

## 2020-09-11 LAB
ANION GAP SERPL CALC-SCNC: 4 MMOL/L (ref 5–19)
BUN SERPL-MCNC: 23 MG/DL (ref 7–20)
CALCIUM: 8.7 MG/DL (ref 8.4–10.2)
CHLORIDE SERPL-SCNC: 98 MMOL/L (ref 98–107)
CO2 SERPL-SCNC: 32 MMOL/L (ref 22–30)
ERYTHROCYTE [DISTWIDTH] IN BLOOD BY AUTOMATED COUNT: 15.2 % (ref 11.5–14)
GLUCOSE SERPL-MCNC: 272 MG/DL (ref 75–110)
HCT VFR BLD CALC: 42.6 % (ref 36–47)
HGB BLD-MCNC: 13.9 G/DL (ref 12–15.5)
MCH RBC QN AUTO: 29.9 PG (ref 27–33.4)
MCHC RBC AUTO-ENTMCNC: 32.5 G/DL (ref 32–36)
MCV RBC AUTO: 92 FL (ref 80–97)
PLATELET # BLD: 390 10^3/UL (ref 150–450)
POTASSIUM SERPL-SCNC: 4.3 MMOL/L (ref 3.6–5)
RBC # BLD AUTO: 4.63 10^6/UL (ref 3.72–5.28)
WBC # BLD AUTO: 7.8 10^3/UL (ref 4–10.5)

## 2020-09-11 RX ADMIN — PREGABALIN SCH MG: 75 CAPSULE ORAL at 21:15

## 2020-09-11 RX ADMIN — DEXAMETHASONE SODIUM PHOSPHATE SCH MG: 4 INJECTION, SOLUTION INTRAMUSCULAR; INTRAVENOUS at 05:49

## 2020-09-11 RX ADMIN — ARIPIPRAZOLE SCH MG: 5 TABLET ORAL at 21:14

## 2020-09-11 RX ADMIN — INSULIN LISPRO SCH UNIT: 100 INJECTION, SOLUTION INTRAVENOUS; SUBCUTANEOUS at 13:32

## 2020-09-11 RX ADMIN — INSULIN HUMAN SCH UNIT: 100 INJECTION, SOLUTION PARENTERAL at 21:23

## 2020-09-11 RX ADMIN — INSULIN HUMAN SCH UNIT: 100 INJECTION, SOLUTION PARENTERAL at 10:18

## 2020-09-11 RX ADMIN — VITAMIN D, TAB 1000IU (100/BT) SCH UNIT: 25 TAB at 10:19

## 2020-09-11 RX ADMIN — DOXAZOSIN SCH MG: 2 TABLET ORAL at 21:15

## 2020-09-11 RX ADMIN — FLUTICASONE PROPIONATE SCH PUFF: 110 AEROSOL, METERED RESPIRATORY (INHALATION) at 21:40

## 2020-09-11 RX ADMIN — INSULIN LISPRO SCH UNIT: 100 INJECTION, SOLUTION INTRAVENOUS; SUBCUTANEOUS at 10:18

## 2020-09-11 RX ADMIN — METOPROLOL SUCCINATE SCH MG: 50 TABLET, EXTENDED RELEASE ORAL at 21:14

## 2020-09-11 RX ADMIN — DEXAMETHASONE SODIUM PHOSPHATE SCH MG: 4 INJECTION, SOLUTION INTRAMUSCULAR; INTRAVENOUS at 13:32

## 2020-09-11 RX ADMIN — ZOLPIDEM TARTRATE SCH MG: 5 TABLET ORAL at 21:14

## 2020-09-11 RX ADMIN — Medication PRN MG: at 21:42

## 2020-09-11 RX ADMIN — TOLTERODINE TARTRATE SCH MG: 1 TABLET, FILM COATED ORAL at 10:19

## 2020-09-11 RX ADMIN — FAMOTIDINE SCH MG: 20 TABLET, FILM COATED ORAL at 10:19

## 2020-09-11 RX ADMIN — INSULIN HUMAN SCH UNIT: 100 INJECTION, SOLUTION PARENTERAL at 17:25

## 2020-09-11 RX ADMIN — DIPHENHYDRAMINE HYDROCHLORIDE SCH MG: 50 CAPSULE ORAL at 21:15

## 2020-09-11 RX ADMIN — Medication SCH: at 13:29

## 2020-09-11 RX ADMIN — FAMOTIDINE SCH MG: 20 TABLET, FILM COATED ORAL at 21:15

## 2020-09-11 RX ADMIN — APIXABAN SCH MG: 5 TABLET, FILM COATED ORAL at 10:19

## 2020-09-11 RX ADMIN — OXYCODONE HYDROCHLORIDE AND ACETAMINOPHEN SCH MG: 500 TABLET ORAL at 17:26

## 2020-09-11 RX ADMIN — FLUTICASONE PROPIONATE SCH: 110 AEROSOL, METERED RESPIRATORY (INHALATION) at 10:19

## 2020-09-11 RX ADMIN — Medication SCH: at 05:49

## 2020-09-11 RX ADMIN — PREGABALIN SCH MG: 75 CAPSULE ORAL at 10:19

## 2020-09-11 RX ADMIN — INSULIN LISPRO SCH UNIT: 100 INJECTION, SOLUTION INTRAVENOUS; SUBCUTANEOUS at 17:26

## 2020-09-11 RX ADMIN — DIGOXIN SCH MG: 0.25 TABLET ORAL at 18:14

## 2020-09-11 RX ADMIN — TOLTERODINE TARTRATE SCH MG: 1 TABLET, FILM COATED ORAL at 21:15

## 2020-09-11 RX ADMIN — METOPROLOL SUCCINATE SCH MG: 50 TABLET, EXTENDED RELEASE ORAL at 10:19

## 2020-09-11 RX ADMIN — INSULIN GLARGINE SCH UNIT: 100 INJECTION, SOLUTION SUBCUTANEOUS at 21:31

## 2020-09-11 RX ADMIN — FENOFIBRATE SCH MG: 145 TABLET ORAL at 10:19

## 2020-09-11 RX ADMIN — APIXABAN SCH MG: 5 TABLET, FILM COATED ORAL at 21:14

## 2020-09-11 RX ADMIN — OXYCODONE HYDROCHLORIDE AND ACETAMINOPHEN SCH MG: 500 TABLET ORAL at 10:19

## 2020-09-11 RX ADMIN — VENLAFAXINE HYDROCHLORIDE SCH MG: 75 CAPSULE, EXTENDED RELEASE ORAL at 10:19

## 2020-09-11 RX ADMIN — Medication SCH ML: at 21:42

## 2020-09-11 NOTE — PDOC PROGRESS REPORT
Subjective


Progress Note for:: 09/11/20


Subjective:: 


feeling well overall, notes that breathing is better and she is requiring less 

O2


Reason For Visit: 


COMMUNITY ACQUIRED BILATERAL MULTIFOCAL PNEUMONIA,








Physical Exam


Vital Signs: 


                                        











Temp Pulse Resp BP Pulse Ox


 


 97.7 F   110 H  18   133/92 H  93 


 


 09/11/20 16:35  09/11/20 16:35  09/11/20 16:35  09/11/20 16:35  09/11/20 16:35








                                 Intake & Output











 09/10/20 09/11/20 09/12/20





 06:59 06:59 06:59


 


Intake Total 737 1480 480


 


Balance 737 1480 480


 


Weight  102.1 kg 











General appearance: PRESENT: no acute distress


Mouth exam: PRESENT: moist


Neck exam: ABSENT: JVD


Respiratory exam: PRESENT: clear to auscultation pallavi, unlabored


Cardiovascular exam: PRESENT: irregular rhythm, tachycardia


GI/Abdominal exam: PRESENT: normal bowel sounds, soft.  ABSENT: tenderness


Neurological exam: PRESENT: alert, awake, oriented to person, oriented to place,

oriented to time, oriented to situation


Psychiatric exam: PRESENT: flat affect





Results


Laboratory Results: 


                                        





                                 09/11/20 05:11 





                                 09/11/20 05:11 





                                        











  09/11/20 09/11/20





  05:11 05:11


 


WBC   7.8


 


RBC   4.63


 


Hgb   13.9


 


Hct   42.6


 


MCV   92


 


MCH   29.9


 


MCHC   32.5


 


RDW   15.2 H


 


Plt Count   390


 


Sodium  133.8 L 


 


Potassium  4.3 


 


Chloride  98 


 


Carbon Dioxide  32 H 


 


Anion Gap  4 L 


 


BUN  23 H 


 


Creatinine  0.79 


 


Est GFR (African Amer)  > 60 


 


Glucose  272 H 


 


Calcium  8.7 








                                        











  09/01/20





  14:47


 


Troponin I  < 0.012











Impressions: 


                                        





Chest X-Ray  09/01/20 15:25


IMPRESSION:  HEART ENLARGED WITHOUT FAILURE.  NO OTHER SIGNIFICANT RADIOGRAPHIC 

FINDING IN THE CHEST.


 








Chest/Abdomen CTA  09/01/20 16:59


IMPRESSION:


 


1.  No central pulmonary embolism.


2.   Commonly reported imaging features of (COVID-19 or viral)


pneumonia are present. Other processes such as influenza


pneumonia and organizing pneumonia, as can be seen with drug


toxicity and connective tissue disease, can cause a similar


imaging pattern. [PneTyp] 


 














Assessment and Plan





- Plan Summary


Summary: 


COVID 19 Pneumonia 


Acute respiratory failure with hypoxia


- wean off O2 as tolerated 


- continue dexamethasone (D9/10) 


- afebrile since 9/4/20





Atrial Flutter with RVR: new diagnosis on 9/09, confirmed by EKG. Resolved with 

addition of digoxin IV and she is back in atrial fibrillation. 


- cardiology consulted


- can not tolerate amio or dilt due to hypotension 


- continue metoprolol 50 mg BID


- telemetry 





Paroxysmal atrial fibrillation with RVR: tachycardia to 115 bpm today on 

telemetry. EKG shows A fib. 


- continue home Eliquis and metoprolol 


- restart IV metoprolol 


- start oral digoxin therapy 





Acute on Chronic diastolic congestive heart failure: resolved with diuresis, she

 has now developed contraction alkalosis, HOLD Lasix 


- re-check BMP in AM


- encourage oral intake 





Hyperglycemia due to diabetes mellitus type 2


- accuchecks still variable and elevated


- continue Lantus 10 U daily 


- start mealtime insulin at 3 U TID AC 


- continue SSI ACHS 





Morbid obesity with BMI of 40.0-44.9, adult


 - encourage diet and exercise, may benefit from bariatric surgery referral in 

future 





Schizoaffective disorder


- continue home medications 





Acute lower UTI (urinary tract infection) without hematuria 


- resolved with IV antibiotics 





- Time


Time Spent with patient: 35 or more minutes


Anticipated Discharge Disposition: Home, Self Care


Anticipated Discharge Timeframe: within 48 hours

## 2020-09-12 VITALS — SYSTOLIC BLOOD PRESSURE: 122 MMHG | DIASTOLIC BLOOD PRESSURE: 92 MMHG

## 2020-09-12 LAB
ANION GAP SERPL CALC-SCNC: 5 MMOL/L (ref 5–19)
BUN SERPL-MCNC: 21 MG/DL (ref 7–20)
CALCIUM: 8.8 MG/DL (ref 8.4–10.2)
CHLORIDE SERPL-SCNC: 100 MMOL/L (ref 98–107)
CO2 SERPL-SCNC: 31 MMOL/L (ref 22–30)
GLUCOSE SERPL-MCNC: 208 MG/DL (ref 75–110)
POTASSIUM SERPL-SCNC: 4.6 MMOL/L (ref 3.6–5)

## 2020-09-12 RX ADMIN — INSULIN HUMAN SCH UNIT: 100 INJECTION, SOLUTION PARENTERAL at 08:20

## 2020-09-12 RX ADMIN — TOLTERODINE TARTRATE SCH MG: 1 TABLET, FILM COATED ORAL at 09:36

## 2020-09-12 RX ADMIN — METOPROLOL SUCCINATE SCH MG: 50 TABLET, EXTENDED RELEASE ORAL at 09:36

## 2020-09-12 RX ADMIN — FAMOTIDINE SCH MG: 20 TABLET, FILM COATED ORAL at 09:36

## 2020-09-12 RX ADMIN — INSULIN HUMAN SCH: 100 INJECTION, SOLUTION PARENTERAL at 12:17

## 2020-09-12 RX ADMIN — APIXABAN SCH MG: 5 TABLET, FILM COATED ORAL at 09:35

## 2020-09-12 RX ADMIN — INSULIN LISPRO SCH UNIT: 100 INJECTION, SOLUTION INTRAVENOUS; SUBCUTANEOUS at 12:41

## 2020-09-12 RX ADMIN — OXYCODONE HYDROCHLORIDE AND ACETAMINOPHEN SCH MG: 500 TABLET ORAL at 09:35

## 2020-09-12 RX ADMIN — FENOFIBRATE SCH MG: 145 TABLET ORAL at 08:19

## 2020-09-12 RX ADMIN — PREGABALIN SCH MG: 75 CAPSULE ORAL at 09:36

## 2020-09-12 RX ADMIN — INSULIN LISPRO SCH UNIT: 100 INJECTION, SOLUTION INTRAVENOUS; SUBCUTANEOUS at 08:19

## 2020-09-12 RX ADMIN — DIGOXIN SCH MG: 0.25 TABLET ORAL at 06:00

## 2020-09-12 RX ADMIN — VITAMIN D, TAB 1000IU (100/BT) SCH UNIT: 25 TAB at 09:35

## 2020-09-12 RX ADMIN — Medication SCH ML: at 06:01

## 2020-09-12 RX ADMIN — FLUTICASONE PROPIONATE SCH: 110 AEROSOL, METERED RESPIRATORY (INHALATION) at 12:17

## 2020-09-12 RX ADMIN — VENLAFAXINE HYDROCHLORIDE SCH MG: 75 CAPSULE, EXTENDED RELEASE ORAL at 09:36

## 2020-09-12 NOTE — PDOC DISCHARGE SUMMARY
Impression





- Admit/DC Date/PCP


Admission Date/Primary Care Provider: 


  09/01/20 21:43





  RIKI ALAS DO





Discharge Date: 09/12/20





- Discharge Diagnosis


(1) Acute respiratory failure with hypoxia


Is this a current diagnosis for this admission?: Yes   





(2) Hyperglycemia due to diabetes mellitus


Is this a current diagnosis for this admission?: Yes   





(3) Pneumonia due to SARS-associated coronavirus


Is this a current diagnosis for this admission?: Yes   





(4) Acute kidney injury


Is this a current diagnosis for this admission?: Yes   





(5) Atrial fibrillation with RVR


Is this a current diagnosis for this admission?: Yes   





(6) Diabetes mellitus type 2 in obese


Is this a current diagnosis for this admission?: Yes   





(7) Leukocytosis, unspecified


Is this a current diagnosis for this admission?: Yes   





(8) Morbid obesity with BMI of 40.0-44.9, adult


Is this a current diagnosis for this admission?: Yes   





(9) UTI (urinary tract infection)


Is this a current diagnosis for this admission?: Yes   





- Assessment


Summary: 


Acute respiratory failure with hypoxia due to COVID 19 Pneumonia: she was 

successfully weaned off O2 after treatment with dexamethasone x10 days. She had 

a normal ambulatory oxygen saturation on the day of discharge. 





Atrial Fibrillation and Flutter with RVR: she has a chronic history of A fib, 

and received a new diagnosis of A flutter on 9/09, confirmed by EKG. Cardiology 

was consulted. A flutter resolved with addition of digoxin and she is back in 

atrial fibrillation and now rate controlled.  Her home metoprolol and Eliquis 

were continued. She may be able to come off of the digoxin in 1 month or so, aft

er complete resolution of Covid infection. Per cardiology, arrhythmia is a 

common complication of Covid pneumonia and frequently new arrhythmias which 

develop acutely resolve with resolution of the infection and its symptoms. 





Acute on Chronic diastolic congestive heart failure: resolved with diuresis 

while inpatient. 





Morbid obesity with BMI of 40.0-44.9, adult: complicated by development of DM2. 

Encourage diet and exercise, and may benefit from bariatric surgery referral in 

future. 





Acute lower UTI (urinary tract infection) without hematuria: resolved with IV 

antibiotics.





- Additional Information


Resuscitation Status: Full Code


Discharge Diet: Cardiac, Diabetic


Discharge Activity: Activity As Tolerated


Referrals: 


RIKI ALAS DO [Primary Care Provider] - Follow up as needed


Prescriptions: 


Digoxin 125 mcg PO DAILY #30 tablet


Home Medications: 








Apixaban [Eliquis 5 mg Tablet] 5 mg PO Q12 12/20/17 


Zolpidem Tartrate [Ambien] 10 mg PO QHS 12/20/17 


Brexpiprazole [Rexulti] 2 mg PO QHS 11/16/18 


Solifenacin Succinate [Vesicare] 10 mg PO DAILY 11/16/18 


Oxycodone HCl/Acetaminophen [Percocet 5-325 mg Tablet] 1 tab PO Q8HP PRN 

03/22/20 


Prazosin HCl [Minipress] 2 mg PO QHS 03/22/20 


Tizanidine HCl 6 mg PO Q8 03/22/20 


Metoprolol Succinate [Toprol Xl 25 mg Tab.sr] 50 mg PO Q12 30 Days 03/31/20 


Lisinopril [Prinivil 5 mg Tablet] 5 mg PO DAILY 06/21/20 


Pregabalin [Lyrica] 225 mg PO Q12 06/21/20 


Alprazolam [Xanax] 1 mg PO TID 09/02/20 


Amitriptyline HCl [Elavil 25 mg Tablet] 50 mg PO QHS 09/02/20 


Desvenlafaxine [Desvenlafaxine ER] 100 mg PO DAILY 09/02/20 


Empagliflozin/Linagliptin [Glyxambi 25 mg-5 mg Tablet] 1 each PO QAM 09/02/20 


Fenofibrate Nanocrystallized [Fenofibrate] 145 mg PO DAILY 09/02/20 


Flovent Hfa 220mcg 1 puff IH BID 09/02/20 


Glipizide [Glipizide Xl] 10 mg PO DAILY 09/02/20 


Metoprolol Succinate [Toprol Xl 50 mg Tab.sr] 50 mg PO BID 09/02/20 


Moxifloxacin HCl [Vigamox 0.5% Oph Soln 3 ml] 1 drop OP TID 09/02/20 


Mupirocin [Bactroban 2% Ointment 22 gm] 1 applic NASL BID 09/02/20 


Digoxin 125 mcg PO DAILY #30 tablet 09/12/20 











History of Present Illiness


History of Present Illness: 


EVERARDO GEORGE is a 55 year old female








Physical Exam


Vital Signs: 


                                        











Temp Pulse Resp BP Pulse Ox


 


 97.4 F   73   20   127/80 H  94 


 


 09/12/20 11:45  09/12/20 11:45  09/12/20 11:45  09/12/20 11:45  09/12/20 11:45








                                 Intake & Output











 09/11/20 09/12/20 09/13/20





 06:59 06:59 06:59


 


Intake Total 1480 480 


 


Balance 1480 480 


 


Weight 102.1 kg 104.3 kg 














Results


Laboratory Results: 


                                        











WBC  7.8 10^3/uL (4.0-10.5)   09/11/20  05:11    


 


RBC  4.63 10^6/uL (3.72-5.28)   09/11/20  05:11    


 


Hgb  13.9 g/dL (12.0-15.5)   09/11/20  05:11    


 


Hct  42.6 % (36.0-47.0)   09/11/20  05:11    


 


MCV  92 fl (80-97)   09/11/20  05:11    


 


MCH  29.9 pg (27.0-33.4)   09/11/20  05:11    


 


MCHC  32.5 g/dL (32.0-36.0)   09/11/20  05:11    


 


RDW  15.2 % (11.5-14.0)  H  09/11/20  05:11    


 


Plt Count  390 10^3/uL (150-450)   09/11/20  05:11    


 


Lymph % (Auto)  26.6 % (13-45)   09/08/20  05:45    


 


Mono % (Auto)  7.9 % (3-13)   09/08/20  05:45    


 


Eos % (Auto)  0.5 % (0-6)   09/08/20  05:45    


 


Baso % (Auto)  0.4 % (0-2)   09/08/20  05:45    


 


Absolute Neuts (auto)  4.7 10^3/uL (1.7-8.2)   09/08/20  05:45    


 


Absolute Lymphs (auto)  1.9 10^3/uL (0.5-4.7)   09/08/20  05:45    


 


Absolute Monos (auto)  0.6 10^3/uL (0.1-1.4)   09/08/20  05:45    


 


Absolute Eos (auto)  0.0 10^3/uL (0.0-0.6)   09/08/20  05:45    


 


Absolute Basos (auto)  0.0 10^3/uL (0.0-0.2)   09/08/20  05:45    


 


Seg Neutrophils %  64.6 % (42-78)   09/08/20  05:45    


 


PT  18.0 SEC (11.4-15.4)  H  09/01/20  14:47    


 


INR  1.47   09/01/20  14:47    


 


D-Dimer  1.14 ug/mL (0.00-0.50)  H  09/06/20  04:39    


 


Sodium  135.5 mmol/L (137-145)  L  09/12/20  06:00    


 


Potassium  4.6 mmol/L (3.6-5.0)   09/12/20  06:00    


 


Chloride  100 mmol/L ()   09/12/20  06:00    


 


Carbon Dioxide  31 mmol/L (22-30)  H  09/12/20  06:00    


 


Anion Gap  5  (5-19)   09/12/20  06:00    


 


BUN  21 mg/dL (7-20)  H  09/12/20  06:00    


 


Creatinine  0.78 mg/dL (0.52-1.25)   09/12/20  06:00    


 


Est GFR ( Amer)  > 60  (>60)   09/12/20  06:00    


 


Est GFR (MDRD) Non-Af  > 60  (>60)   09/12/20  06:00    


 


Glucose  208 mg/dL ()  H  09/12/20  06:00    


 


POC Glucose  125 mg/dL ()  H  09/12/20  11:45    


 


Hemoglobin A1c %  8.9 % (4.7-6.0)  H  09/02/20  06:33    


 


Lactic Acid  0.9 mmol/L (0.7-2.1)   09/01/20  21:36    


 


Calcium  8.8 mg/dL (8.4-10.2)   09/12/20  06:00    


 


Magnesium  2.0 mg/dL (1.6-2.3)   09/08/20  05:45    


 


Ferritin  93.30 ng/mL (11.1-264.0)   09/01/20  14:47    


 


Total Bilirubin  0.5 mg/dL (0.2-1.3)   09/05/20  06:40    


 


Direct Bilirubin  0.4 mg/dL (0.0-0.4)   09/05/20  06:40    


 


Neonat Total Bilirubin  Not Reportable   09/05/20  06:40    


 


Neonat Direct Bilirubin  Not Reportable   09/05/20  06:40    


 


Neonat Indirect Bili  Not Reportable   09/05/20  06:40    


 


AST  27 U/L (14-36)   09/05/20  06:40    


 


ALT  17 U/L (<35)   09/05/20  06:40    


 


Alkaline Phosphatase  96 U/L ()   09/05/20  06:40    


 


Troponin I  < 0.012 ng/mL  09/01/20  14:47    


 


C-Reactive Protein  163.1 mg/L (<10.0)  H  09/06/20  04:39    


 


Total Protein  5.7 g/dL (6.3-8.2)  L  09/05/20  06:40    


 


Albumin  3.0 g/dL (3.5-5.0)  L  09/05/20  06:40    


 


Lipase  39.0 U/L ()   09/01/20  14:47    


 


Serum HCG, Qual  NEGATIVE  (NEGATIVE)   09/01/20  14:47    


 


Urine Color  YELLOW   09/08/20  05:29    


 


Urine Appearance  CLEAR   09/08/20  05:29    


 


Urine pH  5.0  (5.0-9.0)   09/08/20  05:29    


 


Ur Specific Gravity  1.028   09/08/20  05:29    


 


Urine Protein  30 mg/dL (NEGATIVE)  H  09/08/20  05:29    


 


Urine Glucose (UA)  NEGATIVE mg/dL (NEGATIVE)   09/08/20  05:29    


 


Urine Ketones  NEGATIVE mg/dL (NEGATIVE)   09/08/20  05:29    


 


Urine Blood  NEGATIVE  (NEGATIVE)   09/08/20  05:29    


 


Urine Nitrite  NEGATIVE  (NEGATIVE)   09/08/20  05:29    


 


Urine Nitrite (Reflex)  NEGATIVE  (NEGATIVE)   09/01/20  14:47    


 


Urine Bilirubin  NEGATIVE  (NEGATIVE)   09/08/20  05:29    


 


Urine Urobilinogen  NEGATIVE mg/dL (<2.0)   09/08/20  05:29    


 


Ur Leukocyte Esterase  TRACE  (NEGATIVE)  H  09/08/20  05:29    


 


Leukocyte Esterase Rfl  MODERATE  (NEGATIVE)  H  09/01/20  14:47    


 


Urine WBC (Auto)  3 /HPF  09/08/20  05:29    


 


Urine RBC (Auto)  4 /HPF  09/08/20  05:29    


 


Urine Bacteria (Auto)  TRACE /HPF  09/08/20  05:29    


 


Urine WBC (Reflex)  16 /HPF  09/01/20  14:47    


 


Squamous Epi Cells Auto  4 /HPF  09/08/20  05:29    


 


Urine Mucus (Auto)  RARE /LPF  09/08/20  05:29    


 


Urine Yeast (Budding)  PRESENT /HPF  09/01/20  14:47    


 


Urine Ascorbic Acid  40  (NEGATIVE)  H  09/08/20  05:29    


 


Digoxin  1.03 ng/mL (0.8-2.0)   09/10/20  04:45    


 


COVID-19 Source  NASOPHARYNGEAL   09/01/20  16:30    


 


COVID-19 (ROHAN)  DETECTED  H  09/01/20  16:30    








                                        











  09/01/20





  14:47


 


Troponin I  < 0.012











Impressions: 


                                        





Chest X-Ray  09/01/20 15:25


IMPRESSION:  HEART ENLARGED WITHOUT FAILURE.  NO OTHER SIGNIFICANT RADIOGRAPHIC 

FINDING IN THE CHEST.


 








Chest/Abdomen CTA  09/01/20 16:59


IMPRESSION:


 


1.  No central pulmonary embolism.


2.   Commonly reported imaging features of (COVID-19 or viral)


pneumonia are present. Other processes such as influenza


pneumonia and organizing pneumonia, as can be seen with drug


toxicity and connective tissue disease, can cause a similar


imaging pattern. [PneTyp] 


 














Stroke


Is this a Stroke Patient?: No





Acute Heart Failure


Is this a Heart Failure Patient?: Yes


Documentation of LVEF assessment?: Yes


LVEF: LVEF Greater Than 40%


Anticoagulant Therapy: Yes

## 2020-09-14 NOTE — EKG REPORT
SEVERITY:- ABNORMAL ECG -

ATRIAL FIBRILLATION, V-RATE 

NONSPECIFIC REPOL ABNORMALITY, DIFFUSE LEADS

:

Confirmed by: Jacob Melendez MD 14-Sep-2020 02:46:13

## 2021-10-06 NOTE — ER DOCUMENT REPORT
ED Medical Screen (RME)





- General


Chief Complaint: Toe Injury


Stated Complaint: HEADACHE


Time Seen by Provider: 10/21/19 18:16


Primary Care Provider: 


RIKI ALAS DO [Primary Care Provider] - Follow up as needed


Mode of Arrival: Ambulatory


Information source: Patient


Notes: 





54-year-old female presented to ED for complaint of headache for 2 weeks and 

with nausea.  She also is here for a toe injury.  She states she hit her toe on 

a rolling chair metal just before she came during her great toe just behind the 

toenail.  Patient is alert oriented respirations regular and unlabored.  She has

a history of high blood pressure cholesterol diabetes A. fib.  States is been a 

while since she had a menstrual cycle.








I have greeted and performed a rapid initial assessment of this patient.  A 

comprehensive ED assessment and evaluation of the patient, analysis of test 

results and completion of medical decision making process will be conducted by 

an additional ED providers.


TRAVEL OUTSIDE OF THE [place] IN LAST 30 DAYS: No





- Related Data


Allergies/Adverse Reactions: 


                                        





tramadol Allergy (Intermediate, Verified 07/22/19 14:36)


   AMS


Penicillins Allergy (Mild, Verified 07/22/19 14:36)


   rash


Sulfa (Sulfonamide Antibiotics) Allergy (Mild, Verified 07/22/19 14:36)


   rash


aspirin Allergy (Verified 07/22/19 14:36)


   NO ASA











Past Medical History





- Past Medical History


Cardiac Medical History: Reports: Hx Atrial Fibrillation, Hx Heart Attack - A-

FIB, Hx Hypercholesterolemia, Hx Hypertension


   Denies: Hx Coronary Artery Disease


Pulmonary Medical History: Reports: Hx Asthma, Hx Bronchitis


   Denies: Hx COPD, Hx Pneumonia


Neurological Medical History: Reports: Hx Migraine.  Denies: Hx Cerebrovascular 

Accident, Hx Seizures


Endocrine Medical History: Reports: Hx Diabetes Mellitus Type 2, Hx 

Hypothyroidism


Renal/ Medical History: Denies: Hx Peritoneal Dialysis


GI Medical History: Reports: Hx Gastritis, Hx Gastroesophageal Reflux Disease


Musculoskeltal Medical History: Reports Hx Arthritis, Reports Hx Musculoskeletal

Trauma


Psychiatric Medical History: Reports: Hx Anxiety, Hx Depression, Hx 

Schizoaffective Disorder, Hx Schizophrenia - schizo affective


Past Surgical History: Reports: Hx Cholecystectomy, Hx Tubal Ligation.  Denies: 

Hx Hysterectomy





- Immunizations


Immunizations up to date: Yes


Hx Diphtheria, Pertussis, Tetanus Vaccination: Yes - 2013





Physical Exam





- Vital signs


Vitals: 





                                        











Temp Pulse Resp BP Pulse Ox


 


 98.0 F   115 H  17   156/92 H  95 


 


 10/21/19 17:56  10/21/19 17:56  10/21/19 17:56  10/21/19 17:56  10/21/19 17:56














Course





- Vital Signs


Vital signs: 





                                        











Temp Pulse Resp BP Pulse Ox


 


 98.0 F   115 H  17   156/92 H  95 


 


 10/21/19 17:56  10/21/19 17:56  10/21/19 17:56  10/21/19 17:56  10/21/19 17:56














Doctor's Discharge





- Discharge


Referrals: 


RIKI ALAS DO [Primary Care Provider] - Follow up as needed No

## 2023-02-20 NOTE — PDOC PROGRESS REPORT
Subjective


Progress Note for:: 09/04/20


Subjective:: 





Resting in the chair.  Still on nasal cannula but off of the nonrebreather mask.

 States that she feels comfortable and wants to go home.


Reason For Visit: 


COMMUNITY ACQUIRED BILATERAL MULTIFOCAL PNEUMONIA,








Physical Exam


Vital Signs: 


                                        











Temp Pulse Resp BP Pulse Ox


 


 98.9 F   89   22 H  111/73   100 


 


 09/04/20 13:20  09/04/20 14:00  09/04/20 13:20  09/04/20 13:20  09/04/20 13:20








                                 Intake & Output











 09/03/20 09/04/20 09/05/20





 06:59 06:59 06:59


 


Intake Total 4277 2273 237


 


Output Total 2350 1400 300


 


Balance 1927 873 -63


 


Weight 110.7 kg 111.2 kg 











General appearance: PRESENT: cooperative, mild distress, morbidly obese


Head exam: PRESENT: atraumatic, normocephalic


Teeth exam: PRESENT: edentulous


Respiratory exam: PRESENT: rales - Bilateral, symmetrical, other - Very limited 

inspiratory phase.  ABSENT: rhonchi, tachypnea, wheezes


Cardiovascular exam: PRESENT: RRR, +S1, +S2.  ABSENT: bradycardia, diastolic 

murmur, irregular rhythm, systolic murmur, tachycardia


GI/Abdominal exam: PRESENT: normal bowel sounds, soft, other - Protuberant 

abdomen.  ABSENT: tenderness


Rectal exam: PRESENT: deferred


Gentrourinary exam: ABSENT: indwelling catheter


Extremities exam: PRESENT: pedal edema, +1 edema


Musculoskeletal exam: PRESENT: normal inspection.  ABSENT: deformity, 

dislocation


Neurological exam: PRESENT: alert, awake, oriented to person, oriented to place,

oriented to situation


Psychiatric exam: PRESENT: unusual affect - Patient appears to have some 

developmental delay.  She kept her eyes closed during the entire encounter.  She

asked if she was going home multiple times despite answering the question each 

time.


Focused psych exam: ABSENT: delusional, paranoid, restlessness


Skin exam: PRESENT: dry, warm.  ABSENT: rash





Results


Laboratory Results: 


                                        





                                 09/04/20 06:12 





                                 09/04/20 06:12 





                                        











  09/04/20 09/04/20





  06:12 06:12


 


WBC  15.3 H 


 


RBC  4.01 


 


Hgb  11.9 L 


 


Hct  37.2 


 


MCV  93 


 


MCH  29.7 


 


MCHC  32.0 


 


RDW  15.1 H 


 


Plt Count  338 


 


Sodium   132.7 L


 


Potassium   4.6


 


Chloride   96 L


 


Carbon Dioxide   27


 


Anion Gap   10


 


BUN   18


 


Creatinine   1.04


 


Est GFR (African Amer)   > 60


 


Glucose   204 H


 


Calcium   8.8


 


Magnesium   2.0


 


Total Bilirubin   0.6


 


AST   30


 


Alkaline Phosphatase   117


 


C-Reactive Protein   253.0 H


 


Total Protein   6.0 L


 


Albumin   3.3 L








                                        











  09/01/20





  14:47


 


Troponin I  < 0.012











Impressions: 


                                        





Chest X-Ray  09/01/20 15:25


IMPRESSION:  HEART ENLARGED WITHOUT FAILURE.  NO OTHER SIGNIFICANT RADIOGRAPHIC 

FINDING IN THE CHEST.


 








Chest/Abdomen CTA  09/01/20 16:59


IMPRESSION:


 


1.  No central pulmonary embolism.


2.   Commonly reported imaging features of (COVID-19 or viral)


pneumonia are present. Other processes such as influenza


pneumonia and organizing pneumonia, as can be seen with drug


toxicity and connective tissue disease, can cause a similar


imaging pattern. [PneTyp] 


 














Assessment and Plan





- Diagnosis


(1) Pneumonia due to SARS-associated coronavirus


Is this a current diagnosis for this admission?: Yes   


Plan: 


Continue azithromycin as well as remdisivir.  Continue supplements as well as 

oxygen supplementation.








(2) Acute respiratory failure with hypoxia


Is this a current diagnosis for this admission?: Yes   


Plan: 


Still requires 4 L nasal cannula.  Goal would be to maintain saturations between

 90 and 94%








(3) UTI (urinary tract infection)


Qualifiers: 


   Urinary tract infection type: site unspecified   Hematuria presence: without 

hematuria   Qualified Code(s): N39.0 - Urinary tract infection, site not 

specified   


Is this a current diagnosis for this admission?: Yes   


Plan: 


Group G strep covered by Rocephin








(4) Hyponatremia


Is this a current diagnosis for this admission?: Yes   


Plan: 


Sodium slightly lower than yesterday.  Continue to monitor.








(5) Chronic diastolic congestive heart failure


Is this a current diagnosis for this admission?: Yes   


Plan: 


Monitor fluids with intake and output.  This will be very difficult without a 

Up catheter.  The patient kept pulling it out.








(6) Fever


Qualifiers: 


   Fever type: unspecified   Qualified Code(s): R50.9 - Fever, unspecified   


Is this a current diagnosis for this admission?: Yes   


Plan: 


Afebrile today continue to monitor








(7) Paroxysmal atrial fibrillation


Is this a current diagnosis for this admission?: Yes   


Plan: 


Continue current medications and monitor on telemetry








(8) Hyperglycemia due to diabetes mellitus


Is this a current diagnosis for this admission?: Yes   


Plan: 


Continue Accu-Cheks and sliding scale coverage








(9) Hypertension


Qualifiers: 


   Hypertension type: essential hypertension   Qualified Code(s): I10 - 

Essential (primary) hypertension   


Is this a current diagnosis for this admission?: Yes   


Plan: 


Continue current medications.  Monitor for drops in blood pressure.








(10) Morbid obesity with BMI of 40.0-44.9, adult


Is this a current diagnosis for this admission?: Yes   


Plan: 


Unfortunately a very significant comorbidity with her current respiratory state








(11) Schizoaffective disorder


Qualifiers: 


   Schizoaffective disorder type: unspecified   Qualified Code(s): F25.9 - 

Schizoaffective disorder, unspecified   


Is this a current diagnosis for this admission?: Yes   


Plan: 


Review medications brought in by the patient's daughter.  Try to keep her on her

 normal regimen.








(12) Community acquired pneumonia, bilateral


Is this a current diagnosis for this admission?: Yes   


Plan: 


Her pneumonia is caused by COVID virus











- Plan Summary


Summary: 


Patient will be admitted to the medical floor where she will receive routine 

supportive and symptomatic cares.  She will be treated with IV antibiotics 

utilizing Rocephin and azithromycin.  She will receive supplemental oxygen via 

nasal cannula in order to maintain an adequate oxygen saturation.  She will 

receive her usual inhaler medications for her chronic COPD/asthma.  She will 

receive IV dexamethasone 10 mg initially followed by 6 mg/day.  She will receive

 IV fluids with lactated Ringer's solution at 250 mL/h initially.  She will 

receive morphine sulfate 2 to 4 mg IV every 2 hours as needed for pain.  She 

will receive Ativan 1 mg IV every 4 hours as needed for anxiety or restlessness.

  Before meals and at bedtime Accu-Cheks will be performed with sliding scale 

insulin for hyperglycemia and a hypoglycemic protocol in place.  Patient's usual

 home medications will be resumed, as appropriate, as soon as her medication 

list has been verified and reconciled.  She will receive a diabetic, cardiac and

 prerenal restricted diet.





- Time


Time Spent with patient: 15-24 minutes


Medications reviewed and adjusted accordingly: Yes


Anticipated Discharge Disposition: Home with Home Health


Anticipated Discharge Timeframe: Unknown Orbicularis Oris Muscle Flap Text: The defect edges were debeveled with a #15 scalpel blade.  Given that the defect affected the competency of the oral sphincter an obicularis oris muscle flap was deemed most appropriate to restore this competency and normal muscle function.  Using a sterile surgical marker, an appropriate flap was drawn incorporating the defect. The area thus outlined was incised with a #15 scalpel blade.

## 2023-11-14 NOTE — ER DOCUMENT REPORT
Addendum entered and electronically signed by ORLY HALLMAN PA-C  07/01/19 

00:08: 








Discharge





- Discharge


Clinical Impression: 


UTI (urinary tract infection)


Qualifiers:


 Urinary tract infection type: site unspecified Hematuria presence: without 

hematuria Qualified Code(s): N39.0 - Urinary tract infection, site not specified





Condition: Good


Disposition: HOME, SELF-CARE


Instructions:  Nitrofurantoin ([facility]), Urinary Tract Infection ([facility])


Prescriptions: 


Nitrofurantoin Macrocrystal [Macrodantin] 100 mg PO BID #14 capsule


Referrals: 


RIKI ALAS DO [Primary Care Provider] - Follow up as needed





Original Note:








ED General





- General


Chief Complaint: Urinary Problem


Stated Complaint: URINARY PROBLEM


Time Seen by Provider: 06/30/19 23:31


Primary Care Provider: 


RIKI ALAS DO [Primary Care Provider] - Follow up as needed


Mode of Arrival: Ambulatory


Information source: Patient


TRAVEL OUTSIDE OF THE [place] IN LAST 30 DAYS: No





- HPI


Patient complains to provider of: Dysuria, frequency of urination


Onset: This evening


Onset/Duration: Sudden


Quality of pain: Burning


Severity: Moderate


Pain Level: 4


Associated symptoms: denies: Chills, Fever


Exacerbated by: Denies


Relieved by: Denies


Similar symptoms previously: No


Recently seen / treated by doctor: No





- Related Data


Allergies/Adverse Reactions: 


                                        





tramadol Allergy (Intermediate, Verified 04/08/19 00:26)


   AMS


Penicillins Allergy (Mild, Verified 04/08/19 00:26)


   rash


Sulfa (Sulfonamide Antibiotics) Allergy (Mild, Verified 04/08/19 00:26)


   rash


aspirin Allergy (Verified 04/08/19 00:26)


   NO ASA











Past Medical History





- General


Information source: Patient





- Social History


Smoking Status: Never Smoker


Drug Abuse: None


Lives with: Alone


Family History: Reviewed & Not Pertinent, CAD, CVA, DM, Hyperlipidemia, 

Hypertension, Malignancy, Thyroid Disfunction, Other





- Past Medical History


Cardiac Medical History: Reports: Hx Atrial Fibrillation, Hx Heart Attack - A-

FIB, Hx Hypercholesterolemia, Hx Hypertension


   Denies: Hx Coronary Artery Disease


Pulmonary Medical History: Reports: Hx Asthma, Hx Bronchitis


   Denies: Hx COPD, Hx Pneumonia


Neurological Medical History: Reports: Hx Migraine.  Denies: Hx Cerebrovascular 

Accident, Hx Seizures


Endocrine Medical History: Reports: Hx Diabetes Mellitus Type 2, Hx 

Hypothyroidism


Renal/ Medical History: Denies: Hx Peritoneal Dialysis


GI Medical History: Reports: Hx Gastritis, Hx Gastroesophageal Reflux Disease


Musculoskeletal Medical History: Reports Hx Arthritis, Reports Hx 

Musculoskeletal Trauma


Psychiatric Medical History: Reports: Hx Anxiety, Hx Depression, Hx 

Schizoaffective Disorder, Hx Schizophrenia - schizo affective


Past Surgical History: Reports: Hx Cholecystectomy, Hx Tubal Ligation.  Denies: 

Hx Hysterectomy





- Immunizations


Immunizations up to date: Yes


Hx Diphtheria, Pertussis, Tetanus Vaccination: Yes - 2013





Review of Systems





- Review of Systems


Notes: 





Constitutional:  No fevers. No chills.





EENT: No eye redness. No eye pain. No ear pain. No sore throat.





Cardiovascular:  No chest pain. No palpitations.





Respiratory: No cough. No shortness of breath. No respiratory distress.





Gastrointestinal: No abdominal pain. No nausea, vomiting, or diarrhea.





Genitourinary: Positive for dysuria and frequency of urination





Musculoskeletal: Atraumatic. No swelling. No deformities.





Skin: No rash or lesions.





Lymphatic: No swollen lymph nodes.





Neurologic: No headache. No syncope.





Psychiatric: No suicidal or homicidal ideation.





Physical Exam





- Vital signs


Vitals: 





                                        











Temp Pulse Resp BP Pulse Ox


 


 98.2 F   86   16   145/60 H  96 


 


 06/30/19 22:47  06/30/19 22:47  06/30/19 22:47  06/30/19 22:47  06/30/19 22:47














- Notes


Notes: 





General: Well-developed, well-nourished. In no acute distress. Non-toxic 

appearing.





Cardiac: Well-perfused. Regular rate and rhythm. No murmurs, rubs, or gallops. 





Pulmonary: No respiratory distress. No cyanosis. Bilateral lung fiels are clear 

to auscultation.





Abdominal: Suprapubic tenderness to palpation.  Negative for CVA tenderness 

bilaterally..





HEENT: Head is atraumatic. Conjunctivae not reddened. No tearing. PERRL. EOMI. 

Orbits atraumatic. No periorbital swelling or erythema. Oropharynx is without 

erythema, swelling, or exudates.





Neck: Supple. No adenopathy. No meningismus.





Dermatologic: Warm with good turgor. No rash. Atraumatic.





Chest: Atraumatic. No chest wall tenderness to palpation.





Musculoskeletal: Moves all extremities well. No range of motion deficits. no 

muscular or joint tenderness. No paraspinal muscle tenderness. no midline spinal

tenderness or step-off.





Genitourinary: Examination deferred





Neurologic: No gross neurologic deficits.





Psychiatric: Normal mood. 











Course





- Re-evaluation


Re-evalutation: 





06/30/19 23:55


UA shows UTI.  Will discharge with antibiotics





- Vital Signs


Vital signs: 





                                        











Temp Pulse Resp BP Pulse Ox


 


 98.2 F   86   16   145/60 H  96 


 


 06/30/19 22:47  06/30/19 22:47  06/30/19 22:47  06/30/19 22:47  06/30/19 22:47














- Laboratory


Laboratory results interpreted by me: 





                                        











  06/30/19





  22:45


 


Urine Protein  100 H


 


Urine Glucose (UA)  50 H


 


Urine Blood  LARGE H


 


Ur Leukocyte Esterase  LARGE H














Discharge





- Discharge


Clinical Impression: 


UTI (urinary tract infection)


Qualifiers:


 Urinary tract infection type: site unspecified Hematuria presence: without 

hematuria Qualified Code(s): N39.0 - Urinary tract infection, site not specified





Condition: Good


Disposition: HOME, SELF-CARE


Instructions:  Urinary Tract Infection ([facility]), Nitrofurantoin ([facility])


Prescriptions: 


Nitrofurantoin Macrocrystal [Macrodantin] 100 mg PO BID #14 capsule


Referrals: 


RIKI ALAS DO [Primary Care Provider] - Follow up as needed Xray Chest 1 View- PORTABLE-Urgent